# Patient Record
Sex: MALE | Race: WHITE | NOT HISPANIC OR LATINO | Employment: UNEMPLOYED | URBAN - METROPOLITAN AREA
[De-identification: names, ages, dates, MRNs, and addresses within clinical notes are randomized per-mention and may not be internally consistent; named-entity substitution may affect disease eponyms.]

---

## 2017-05-11 ENCOUNTER — HOSPITAL ENCOUNTER (EMERGENCY)
Facility: HOSPITAL | Age: 62
Discharge: HOME | End: 2017-05-11
Attending: EMERGENCY MEDICINE
Payer: MEDICAID

## 2017-05-11 VITALS
DIASTOLIC BLOOD PRESSURE: 80 MMHG | BODY MASS INDEX: 34.93 KG/M2 | SYSTOLIC BLOOD PRESSURE: 145 MMHG | TEMPERATURE: 98 F | OXYGEN SATURATION: 97 % | WEIGHT: 223 LBS | RESPIRATION RATE: 19 BRPM | HEART RATE: 79 BPM

## 2017-05-11 DIAGNOSIS — E86.0 DEHYDRATION: Primary | ICD-10-CM

## 2017-05-11 LAB
ALBUMIN SERPL BCP-MCNC: 4.5 G/DL
ALP SERPL-CCNC: 68 U/L
ALT SERPL W/O P-5'-P-CCNC: 27 U/L
ANION GAP SERPL CALC-SCNC: 13 MMOL/L
AST SERPL-CCNC: 30 U/L
BASOPHILS # BLD AUTO: 0.06 K/UL
BASOPHILS NFR BLD: 0.7 %
BILIRUB SERPL-MCNC: 0.6 MG/DL
BILIRUB UR QL STRIP: NEGATIVE
BUN SERPL-MCNC: 17 MG/DL
CALCIUM SERPL-MCNC: 9.4 MG/DL
CHLORIDE SERPL-SCNC: 102 MMOL/L
CK SERPL-CCNC: 334 U/L
CLARITY UR REFRACT.AUTO: CLEAR
CO2 SERPL-SCNC: 25 MMOL/L
COLOR UR AUTO: YELLOW
CREAT SERPL-MCNC: 1.2 MG/DL
DIFFERENTIAL METHOD: NORMAL
EOSINOPHIL # BLD AUTO: 0.2 K/UL
EOSINOPHIL NFR BLD: 2 %
ERYTHROCYTE [DISTWIDTH] IN BLOOD BY AUTOMATED COUNT: 13 %
EST. GFR  (AFRICAN AMERICAN): >60 ML/MIN/1.73 M^2
EST. GFR  (NON AFRICAN AMERICAN): >60 ML/MIN/1.73 M^2
GLUCOSE SERPL-MCNC: 134 MG/DL
GLUCOSE UR QL STRIP: NEGATIVE
HCT VFR BLD AUTO: 46.7 %
HGB BLD-MCNC: 16 G/DL
HGB UR QL STRIP: ABNORMAL
KETONES UR QL STRIP: NEGATIVE
LEUKOCYTE ESTERASE UR QL STRIP: NEGATIVE
LYMPHOCYTES # BLD AUTO: 2.7 K/UL
LYMPHOCYTES NFR BLD: 31 %
MCH RBC QN AUTO: 30 PG
MCHC RBC AUTO-ENTMCNC: 34.3 %
MCV RBC AUTO: 88 FL
MONOCYTES # BLD AUTO: 0.8 K/UL
MONOCYTES NFR BLD: 9.2 %
NEUTROPHILS # BLD AUTO: 5 K/UL
NEUTROPHILS NFR BLD: 56.8 %
NITRITE UR QL STRIP: NEGATIVE
PH UR STRIP: 6 [PH] (ref 5–8)
PLATELET # BLD AUTO: 299 K/UL
PMV BLD AUTO: 9.7 FL
POTASSIUM SERPL-SCNC: 3.6 MMOL/L
PROT SERPL-MCNC: 7.9 G/DL
PROT UR QL STRIP: NEGATIVE
RBC # BLD AUTO: 5.33 M/UL
SODIUM SERPL-SCNC: 140 MMOL/L
SP GR UR STRIP: <=1.005 (ref 1–1.03)
URN SPEC COLLECT METH UR: ABNORMAL
UROBILINOGEN UR STRIP-ACNC: NEGATIVE EU/DL
WBC # BLD AUTO: 8.81 K/UL

## 2017-05-11 PROCEDURE — 81003 URINALYSIS AUTO W/O SCOPE: CPT

## 2017-05-11 PROCEDURE — 99283 EMERGENCY DEPT VISIT LOW MDM: CPT | Mod: 25

## 2017-05-11 PROCEDURE — 82550 ASSAY OF CK (CPK): CPT

## 2017-05-11 PROCEDURE — 25000003 PHARM REV CODE 250: Performed by: EMERGENCY MEDICINE

## 2017-05-11 PROCEDURE — 85025 COMPLETE CBC W/AUTO DIFF WBC: CPT

## 2017-05-11 PROCEDURE — 80053 COMPREHEN METABOLIC PANEL: CPT

## 2017-05-11 PROCEDURE — 96360 HYDRATION IV INFUSION INIT: CPT

## 2017-05-11 RX ADMIN — SODIUM CHLORIDE 1000 ML: 0.9 INJECTION, SOLUTION INTRAVENOUS at 09:05

## 2017-05-11 NOTE — ED AVS SNAPSHOT
[facility]  [address] [address] [address]               Eber Wilson   2017  8:52 AM   ED    Description:  Male : 1955   Department:  [facility]           Your Care was Coordinated By:     Provider Role From To    Antolin Jacob MD Attending Provider 17 0849 --      Reason for Visit     Dehydration           Diagnoses this Visit        Comments    Dehydration    -  Primary       ED Disposition     ED Disposition Condition Comment    Discharge             To Do List           Follow-up Information     Follow up with JOHN Corley In 2 days.    Specialty:  Family Medicine    Contact information:    [address] [facility]  [place] [address]  [phone]        [facility]     [facility] -  Assistance  Unless otherwise directed by your provider, please contact [facility], our nurse care line that is available for  assistance.     Registered nurses in the [facility] provide: appointment scheduling, clinical advisement, health education, and other advisory services.  Call: [phone] (toll free)               Medications           Message regarding Medications     Verify the changes and/or additions to your medication regime listed below are the same as discussed with your clinician today.  If any of these changes or additions are incorrect, please notify your healthcare provider.        These medications were administered today        Dose Freq    sodium chloride 0.9% bolus 1,000 mL 1,000 mL ED 1 Time    Sig: Inject 1,000 mLs into the vein ED 1 Time.    Class: Normal    Route: Intravenous           Verify that the below list of medications is an accurate representation of the medications you are currently taking.  If none reported, the list may be blank. If incorrect, please contact your healthcare provider. Carry this list with you in case of emergency.           Current  Medications     alprazolam (XANAX) 0.5 MG tablet Take 0.5 mg by mouth 3 (three) times daily.    budesonide-formoterol 160-4.5 mcg (SYMBICORT) 160-4.5 mcg/actuation HFAA Inhale 2 puffs into the lungs every 12 (twelve) hours.    lisinopril 10 MG tablet Take 10 mg by mouth once daily.           Clinical Reference Information           Your Vitals Were     BP Pulse Temp Resp Weight SpO2    158/86 (BP Location: Left arm, Patient Position: Sitting) 93 97.9 °F (36.6 °C) (Oral) 18 101.2 kg (223 lb) 93%    BMI                34.93 kg/m2          Allergies as of 5/11/2017        Reactions    Amoxicillin Itching    Adhesive Rash      Immunizations Administered on Date of Encounter - 5/11/2017     None      ED Micro, Lab, POCT     Start Ordered       Status Ordering Provider    05/11/17 0856 05/11/17 0855  CBC auto differential  STAT      Final result     05/11/17 0856 05/11/17 0855  Comprehensive metabolic panel  STAT      Final result     05/11/17 0856 05/11/17 0855  Urinalysis  STAT      Final result     05/11/17 0856 05/11/17 0855  CK  STAT      Final result       ED Imaging Orders     None        Discharge Instructions         Dehydration    The human body is comprised largely of water. If you lose more fluids than you take in, you can become dehydrated. This means there are not enough fluids in your body for it to function right. Mild dehydration can cause weakness, confusion, or muscle cramps. In extreme cases, it can lead to brain damage and even death. That's why prompt treatment is crucial.  Risk factors  Anyone can become dehydrated. But infants, children, and older adults are at greatest risk. You are most likely to lose fluids with severe vomiting, diarrhea, or a fever. Exercising or working hard--especially in hot weather--can also cause excess fluid loss.  What to do  Drinking liquids is the best way to prevent dehydration. Water is best, but juice or frozen pops can also help. Your doctor may suggest electrolyte  solutions for sick infants and young children.  When to go to the emergency room (ER)  Go to an ER right away for these symptoms:  Adults  · Very dark urine and little urine output  · Dizziness, weakness, confusion, fainting  Children  · Sunken eyes  · Little or no urine output (for infants, no wet diaper in 8 hours)  · Very dark urine  · Skin that doesn't bounce back quickly when pinched  · Crying without tears  What to expect in the ER  Your blood pressure, temperature, and heart rate will be checked. You may have blood or urine tests. The main treatment for dehydration is fluids. You may be given these to drink. Or, you may receive them through a vein in your arm. You also may be treated for diarrhea, vomiting, or a high fever.   Date Last Reviewed: 7/18/2015 © 2000-2016 [employer]. [address] [place] [address]. All rights reserved. This information is not intended as a substitute for professional medical care. Always follow your healthcare professional's instructions.          [facility] Sign-Up     Activating your [facility] account is as easy as 1-2-3!     1) Visit [email or web address], select Sign Up Now, enter this activation code and your date of birth, then select Next.  [email or web address]  Expires: 6/25/2017 10:03 AM      2) Create a username and password to use when you visit [facility] in the future and select a security question in case you lose your password and select Next.    3) Enter your e-mail address and click Sign Up!    Additional Information  If you have questions, please e-mail [email or web address] or call [phone] to talk to our [facility] staff. Remember, [facility] is NOT to be used for urgent needs. For medical emergencies, dial 911.         Smoking Cessation     If you would like to quit smoking:   You may be eligible for free services if you are a [place] resident and started smoking cigarettes before September 1, 1988.  Call the Smoking Cessation Trust (SCT)  toll free at (243) 058-7818 or ([phone]) 078-2088.   Call 1-800-QUIT-NOW if you do not meet the above criteria.   Contact us via email: [email or web address]   View our website for more information: [email or web address]         [facility] complies with applicable Federal civil rights laws and does not discriminate on the basis of race, color, national origin, age, disability, or sex.        Language Assistance Services     ATTENTION: Language assistance services are available, free of charge. Please call [phone].      ATENCIÓN: Si habla español, tiene a bowser disposición servicios gratuitos de asistencia lingüística. Llame al [phone].     CHÚ Ý: N?u b?n nói Ti?ng Vi?t, có các d?ch v? h? tr? ngôn ng? mi?n phí dành cho b?n. G?i s? [phone].

## 2017-05-11 NOTE — ED PROVIDER NOTES
"Encounter Date: 5/11/2017       History     Chief Complaint   Patient presents with    Dehydration     "been crawfishing, barely peeing like coffee"     Review of patient's allergies indicates:   Allergen Reactions    Amoxicillin Itching    Adhesive Rash     Patient is a 62 y.o. male presenting with the following complaint: general illness. The history is provided by the patient.   General Illness    The current episode started yesterday. The problem occurs rarely. The problem has been gradually improving. Relieved by: Drinking water. The symptoms are aggravated by activity. Associated symptoms include nausea and muscle aches. Pertinent negatives include no fever, no diarrhea, no vomiting, no sore throat, no cough, no shortness of breath, no wheezing and no rash. He has received no recent medical care.     Past Medical History:   Diagnosis Date    Anxiety     Diverticular disease     Hypertension      Past Surgical History:   Procedure Laterality Date    COLOSTOMY CLOSURE      HERNIA REPAIR      LAPAROSCOPIC COLOSTOMY       History reviewed. No pertinent family history.  Social History   Substance Use Topics    Smoking status: Current Every Day Smoker     Packs/day: 1.50     Types: Cigarettes    Smokeless tobacco: Never Used    Alcohol use No     Review of Systems   Constitutional: Negative for fever.   HENT: Negative for sore throat.    Respiratory: Negative for cough, shortness of breath and wheezing.    Cardiovascular: Negative for chest pain.   Gastrointestinal: Positive for nausea. Negative for diarrhea and vomiting.   Genitourinary: Negative for dysuria.   Musculoskeletal: Negative for back pain.   Skin: Negative for rash.   Neurological: Negative for weakness.   Hematological: Does not bruise/bleed easily.       Physical Exam   Initial Vitals   BP Pulse Resp Temp SpO2   05/11/17 0849 05/11/17 0849 05/11/17 0849 05/11/17 0849 05/11/17 0849   158/86 93 18 97.9 °F (36.6 °C) 93 %     Physical " Exam    Constitutional: He appears well-developed and well-nourished. No distress.   HENT:   Head: Normocephalic and atraumatic.   Eyes: Conjunctivae are normal. Pupils are equal, round, and reactive to light.   Neck: Normal range of motion. Neck supple.   Cardiovascular: Normal rate, regular rhythm and normal heart sounds.   Pulmonary/Chest: Breath sounds normal.   Abdominal: Soft. Bowel sounds are normal.   Musculoskeletal: Normal range of motion.   Neurological: He is alert and oriented to person, place, and time. No cranial nerve deficit.   Skin: Skin is warm and dry.   Psychiatric: He has a normal mood and affect.         ED Course   Procedures  Labs Reviewed   COMPREHENSIVE METABOLIC PANEL - Abnormal; Notable for the following:        Result Value    Glucose 134 (*)     All other components within normal limits   URINALYSIS - Abnormal; Notable for the following:     Occult Blood UA Trace (*)     All other components within normal limits   CK - Abnormal; Notable for the following:      (*)     All other components within normal limits   CBC W/ AUTO DIFFERENTIAL        ED Vital Signs:  Vitals:    05/11/17 0849   BP: (!) 158/86   Pulse: 93   Resp: 18   Temp: 97.9 °F (36.6 °C)   TempSrc: Oral   SpO2: (!) 93%   Weight: 101.2 kg (223 lb)         Abnormal Lab Results:  Labs Reviewed   COMPREHENSIVE METABOLIC PANEL - Abnormal; Notable for the following:        Result Value    Glucose 134 (*)     All other components within normal limits   URINALYSIS - Abnormal; Notable for the following:     Occult Blood UA Trace (*)     All other components within normal limits   CK - Abnormal; Notable for the following:      (*)     All other components within normal limits   CBC W/ AUTO DIFFERENTIAL          All Lab Results:  Results for orders placed or performed during the hospital encounter of 05/11/17   CBC auto differential   Result Value Ref Range    WBC 8.81 3.90 - 12.70 K/uL    RBC 5.33 4.60 - 6.20 M/uL     Hemoglobin 16.0 14.0 - 18.0 g/dL    Hematocrit 46.7 40.0 - 54.0 %    MCV 88 82 - 98 fL    MCH 30.0 27.0 - 31.0 pg    MCHC 34.3 32.0 - 36.0 %    RDW 13.0 11.5 - 14.5 %    Platelets 299 150 - 350 K/uL    MPV 9.7 9.2 - 12.9 fL    Gran # 5.0 1.8 - 7.7 K/uL    Lymph # 2.7 1.0 - 4.8 K/uL    Mono # 0.8 0.3 - 1.0 K/uL    Eos # 0.2 0.0 - 0.5 K/uL    Baso # 0.06 0.00 - 0.20 K/uL    Gran% 56.8 38.0 - 73.0 %    Lymph% 31.0 18.0 - 48.0 %    Mono% 9.2 4.0 - 15.0 %    Eosinophil% 2.0 0.0 - 8.0 %    Basophil% 0.7 0.0 - 1.9 %    Differential Method Automated    Comprehensive metabolic panel   Result Value Ref Range    Sodium 140 136 - 145 mmol/L    Potassium 3.6 3.5 - 5.1 mmol/L    Chloride 102 95 - 110 mmol/L    CO2 25 23 - 29 mmol/L    Glucose 134 (H) 70 - 110 mg/dL    BUN, Bld 17 8 - 23 mg/dL    Creatinine 1.2 0.5 - 1.4 mg/dL    Calcium 9.4 8.7 - 10.5 mg/dL    Total Protein 7.9 6.0 - 8.4 g/dL    Albumin 4.5 3.5 - 5.2 g/dL    Total Bilirubin 0.6 0.1 - 1.0 mg/dL    Alkaline Phosphatase 68 55 - 135 U/L    AST 30 10 - 40 U/L    ALT 27 10 - 44 U/L    Anion Gap 13 8 - 16 mmol/L    eGFR if African American >60.0 >60 mL/min/1.73 m^2    eGFR if non African American >60.0 >60 mL/min/1.73 m^2   Urinalysis   Result Value Ref Range    Specimen UA Urine, Clean Catch     Color, UA Yellow Yellow, Straw, Abbey    Appearance, UA Clear Clear    pH, UA 6.0 5.0 - 8.0    Specific Gravity, UA <=1.005 1.005 - 1.030    Protein, UA Negative Negative    Glucose, UA Negative Negative    Ketones, UA Negative Negative    Bilirubin (UA) Negative Negative    Occult Blood UA Trace (A) Negative    Nitrite, UA Negative Negative    Urobilinogen, UA Negative <2.0 EU/dL    Leukocytes, UA Negative Negative   CK   Result Value Ref Range     (H) 20 - 200 U/L           Imaging Results:  Imaging Results     None             The Emergency Provider reviewed the vital signs and test results, which are outlined above.    ED Discussions:  10:04 AM: Reassessed pt at this  time.  Pt states his condition has improved at this time.  Patient states he feels much better after IV fluids. Discussed with pt all pertinent ED information and results. Discussed pt dx of dehydration and plan of tx. Gave pt all f/u and return to the ED instructions. All questions and concerns were addressed at this time. Pt expresses understanding of information and instructions, and is comfortable with plan to discharge. Pt is stable for discharge.                                 ED Course     Clinical Impression:       ICD-10-CM ICD-9-CM   1. Dehydration E86.0 276.51         Disposition:   Disposition: Discharged  Condition: Stable       Antolin Jacob MD  05/11/17 1004

## 2017-05-11 NOTE — DISCHARGE INSTRUCTIONS
Dehydration    The human body is comprised largely of water. If you lose more fluids than you take in, you can become dehydrated. This means there are not enough fluids in your body for it to function right. Mild dehydration can cause weakness, confusion, or muscle cramps. In extreme cases, it can lead to brain damage and even death. That's why prompt treatment is crucial.  Risk factors  Anyone can become dehydrated. But infants, children, and older adults are at greatest risk. You are most likely to lose fluids with severe vomiting, diarrhea, or a fever. Exercising or working hard--especially in hot weather--can also cause excess fluid loss.  What to do  Drinking liquids is the best way to prevent dehydration. Water is best, but juice or frozen pops can also help. Your doctor may suggest electrolyte solutions for sick infants and young children.  When to go to the emergency room (ER)  Go to an ER right away for these symptoms:  Adults  · Very dark urine and little urine output  · Dizziness, weakness, confusion, fainting  Children  · Sunken eyes  · Little or no urine output (for infants, no wet diaper in 8 hours)  · Very dark urine  · Skin that doesn't bounce back quickly when pinched  · Crying without tears  What to expect in the ER  Your blood pressure, temperature, and heart rate will be checked. You may have blood or urine tests. The main treatment for dehydration is fluids. You may be given these to drink. Or, you may receive them through a vein in your arm. You also may be treated for diarrhea, vomiting, or a high fever.   Date Last Reviewed: 7/18/2015  © 2354-0205 [employer]. [address] [place] [address], [place] [address]. All rights reserved. This information is not intended as a substitute for professional medical care. Always follow your healthcare professional's instructions.

## 2017-05-26 ENCOUNTER — HOSPITAL ENCOUNTER (EMERGENCY)
Facility: HOSPITAL | Age: 62
Discharge: HOME | End: 2017-05-26
Attending: EMERGENCY MEDICINE
Payer: MEDICAID

## 2017-05-26 VITALS
WEIGHT: 224 LBS | TEMPERATURE: 99 F | SYSTOLIC BLOOD PRESSURE: 139 MMHG | OXYGEN SATURATION: 98 % | BODY MASS INDEX: 33.95 KG/M2 | HEART RATE: 90 BPM | DIASTOLIC BLOOD PRESSURE: 88 MMHG | HEIGHT: 68 IN | RESPIRATION RATE: 20 BRPM

## 2017-05-26 DIAGNOSIS — T14.8XXA ABRASION: ICD-10-CM

## 2017-05-26 DIAGNOSIS — S09.90XA HEAD INJURY, INITIAL ENCOUNTER: Primary | ICD-10-CM

## 2017-05-26 DIAGNOSIS — R41.82 ALTERED MENTAL STATUS: ICD-10-CM

## 2017-05-26 DIAGNOSIS — T14.90XA TRAUMA, BLUNT: ICD-10-CM

## 2017-05-26 PROCEDURE — 90715 TDAP VACCINE 7 YRS/> IM: CPT | Performed by: EMERGENCY MEDICINE

## 2017-05-26 PROCEDURE — 90471 IMMUNIZATION ADMIN: CPT | Performed by: EMERGENCY MEDICINE

## 2017-05-26 PROCEDURE — 63600175 PHARM REV CODE 636 W HCPCS: Performed by: EMERGENCY MEDICINE

## 2017-05-26 PROCEDURE — 99284 EMERGENCY DEPT VISIT MOD MDM: CPT

## 2017-05-26 RX ADMIN — CLOSTRIDIUM TETANI TOXOID ANTIGEN (FORMALDEHYDE INACTIVATED), CORYNEBACTERIUM DIPHTHERIAE TOXOID ANTIGEN (FORMALDEHYDE INACTIVATED), BORDETELLA PERTUSSIS TOXOID ANTIGEN (GLUTARALDEHYDE INACTIVATED), BORDETELLA PERTUSSIS FILAMENTOUS HEMAGGLUTININ ANTIGEN (FORMALDEHYDE INACTIVATED), BORDETELLA PERTUSSIS PERTACTIN ANTIGEN, AND BORDETELLA PERTUSSIS FIMBRIAE 2/3 ANTIGEN 0.5 ML: 5; 2; 2.5; 5; 3; 5 INJECTION, SUSPENSION INTRAMUSCULAR at 12:05
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
## 2017-05-31 ENCOUNTER — HOSPITAL ENCOUNTER (OUTPATIENT)
Dept: RADIOLOGY | Facility: HOSPITAL | Age: 62
Discharge: HOME | End: 2017-05-31
Attending: FAMILY MEDICINE
Payer: MEDICAID

## 2017-05-31 ENCOUNTER — HOSPITAL ENCOUNTER (EMERGENCY)
Facility: HOSPITAL | Age: 62
Discharge: HOME | End: 2017-05-31
Payer: MEDICAID

## 2017-05-31 VITALS
HEIGHT: 68 IN | RESPIRATION RATE: 18 BRPM | SYSTOLIC BLOOD PRESSURE: 173 MMHG | HEART RATE: 106 BPM | DIASTOLIC BLOOD PRESSURE: 83 MMHG | WEIGHT: 215 LBS | BODY MASS INDEX: 32.58 KG/M2 | TEMPERATURE: 98 F | OXYGEN SATURATION: 97 %

## 2017-05-31 DIAGNOSIS — R05.9 COUGH: ICD-10-CM

## 2017-05-31 DIAGNOSIS — F17.210 CIGARETTE SMOKER: Primary | ICD-10-CM

## 2017-05-31 DIAGNOSIS — J44.1 OBSTRUCTIVE CHRONIC BRONCHITIS WITH EXACERBATION: ICD-10-CM

## 2017-05-31 DIAGNOSIS — F17.210 CIGARETTE SMOKER: ICD-10-CM

## 2017-05-31 DIAGNOSIS — J44.1 COPD EXACERBATION: Primary | ICD-10-CM

## 2017-05-31 DIAGNOSIS — R06.2 WHEEZING: ICD-10-CM

## 2017-05-31 DIAGNOSIS — Z72.0 DECLINED SMOKING CESSATION: ICD-10-CM

## 2017-05-31 PROCEDURE — 71020 XR CHEST PA AND LATERAL: CPT | Mod: 26,,, | Performed by: RADIOLOGY

## 2017-05-31 PROCEDURE — 94640 AIRWAY INHALATION TREATMENT: CPT

## 2017-05-31 PROCEDURE — 99900035 HC TECH TIME PER 15 MIN (STAT)

## 2017-05-31 PROCEDURE — 99283 EMERGENCY DEPT VISIT LOW MDM: CPT | Mod: 25

## 2017-05-31 PROCEDURE — 25000242 PHARM REV CODE 250 ALT 637 W/ HCPCS: Performed by: PHYSICIAN ASSISTANT

## 2017-05-31 RX ORDER — FLUTICASONE FUROATE AND VILANTEROL 100; 25 UG/1; UG/1
1 POWDER RESPIRATORY (INHALATION) DAILY
COMMUNITY
End: 2017-09-30

## 2017-05-31 RX ORDER — ALBUTEROL SULFATE 90 UG/1
2 AEROSOL, METERED RESPIRATORY (INHALATION) EVERY 6 HOURS PRN
COMMUNITY

## 2017-05-31 RX ORDER — IPRATROPIUM BROMIDE AND ALBUTEROL SULFATE 2.5; .5 MG/3ML; MG/3ML
3 SOLUTION RESPIRATORY (INHALATION)
Status: COMPLETED | OUTPATIENT
Start: 2017-05-31 | End: 2017-05-31

## 2017-05-31 RX ORDER — PREDNISONE 20 MG/1
20 TABLET ORAL DAILY
Status: ON HOLD | COMMUNITY
End: 2017-06-04 | Stop reason: HOSPADM

## 2017-05-31 RX ORDER — AZITHROMYCIN 250 MG/1
500 TABLET, FILM COATED ORAL DAILY
Status: ON HOLD | COMMUNITY
End: 2017-06-04 | Stop reason: HOSPADM

## 2017-05-31 RX ADMIN — IPRATROPIUM BROMIDE AND ALBUTEROL SULFATE 3 ML: .5; 3 SOLUTION RESPIRATORY (INHALATION) at 04:05

## 2017-05-31 NOTE — ED PROVIDER NOTES
History      Chief Complaint   Patient presents with    Cough     cough and congestion since fri seen per dr. jones given steriod shot.       Review of patient's allergies indicates:   Allergen Reactions    Amoxicillin Itching    Penicillins     Adhesive Rash        HPI   HPI    5/31/2017, 3:52 PM   History obtained from the patient      History of Present Illness: Eber Wilson is a 62 y.o. male patient who presents to the Emergency Department for cough and congestion x 5 days.  Patient has history of COPD.  Patient was prescribed Zpak, Breo Ellipta inhaler, Ventolin HFA, prednisone and steroid injection by PCP yesterday.  He states that he started Zpak, Ventolin HFA, and prednisone today.  Patient also had chest xray earlier today.       Arrival mode: Personal vehicle    PCP: JOHN Corley       Past Medical History:  Past Medical History:   Diagnosis Date    Anxiety     COPD (chronic obstructive pulmonary disease)     Diverticular disease     Hypertension        Past Surgical History:  Past Surgical History:   Procedure Laterality Date    COLOSTOMY CLOSURE      HERNIA REPAIR      LAPAROSCOPIC COLOSTOMY           Family History:  Family History   Problem Relation Age of Onset    COPD Mother     COPD Father        Social History:  Social History     Social History Main Topics    Smoking status: Current Every Day Smoker     Packs/day: 2.00     Types: Cigarettes    Smokeless tobacco: Never Used    Alcohol use No    Drug use: No    Sexual activity: Yes     Partners: Female       ROS   Review of Systems   Constitutional: Negative for chills and fever.   HENT: Positive for congestion, postnasal drip and rhinorrhea. Negative for ear pain.    Respiratory: Positive for cough, shortness of breath and wheezing.    Gastrointestinal: Negative for diarrhea and vomiting.       Physical Exam      Initial Vitals [05/31/17 1512]   BP Pulse Resp Temp SpO2   (!) 167/93 96 20 98.3 °F (36.8 °C) (!) 91 %  "     Physical Exam  Nursing Notes and Vital Signs Reviewed.  Constitutional: Patient is in no apparent distress. Awake and alert. Well-developed and well-nourished.  Head: Atraumatic. Normocephalic.  Eyes: PERRL. EOM intact. Conjunctivae are not pale. No scleral icterus.  ENT: Mucous membranes are moist. Oropharynx is clear and symmetric.    Neck: Supple. Full ROM. No lymphadenopathy.  Cardiovascular: Regular rate. Regular rhythm. No murmurs, rubs, or gallops.   Pulmonary/Chest: No respiratory distress. Clear to auscultation bilaterally. Expiratory wheezing.  Cough present  Abdominal: Soft and non-distended.  There is no tenderness.  No rebound, guarding, or rigidity. Good bowel sounds.  Musculoskeletal: Moves all extremities. No obvious deformities. No edema.   Skin: Warm and dry.  Neurological:  Alert, awake, and appropriate.  Normal speech.  No acute focal neurological deficits are appreciated.  Psychiatric: Normal affect. Good eye contact. Appropriate in content.    ED Course    Procedures  ED Vital Signs:  Vitals:    05/31/17 1512 05/31/17 1603 05/31/17 1613 05/31/17 1617   BP: (!) 167/93      Pulse: 96 95 96 99   Resp: 20 18 18 18   Temp: 98.3 °F (36.8 °C)      TempSrc: Oral      SpO2: (!) 91% 96% 100% 100%   Weight: 97.5 kg (215 lb)      Height: 5' 8" (1.727 m)       05/31/17 1639   BP: (!) 173/83   Pulse: 106   Resp: 18   Temp:    TempSrc:    SpO2: 97%   Weight:    Height:        Abnormal Lab Results:  Labs Reviewed - No data to display         Imaging Results:  Imaging Results    None                 The Emergency Provider reviewed the vital signs and test results, which are outlined above.    ED Discussion     Discussed with pt all pertinent ED information and results. Discussed pt dx and plan of tx. Gave pt all f/u and return to the ED instructions. All questions and concerns were addressed at this time. Pt expresses understanding of information and instructions, and is comfortable with plan to discharge. " Pt is stable for discharge.    I discussed with patient and/or family/caretaker that evaluation in the ED does not suggest any emergent or life threatening medical conditions requiring immediate intervention beyond what was provided in the ED, and I believe patient is safe for discharge.  Regardless, an unremarkable evaluation in the ED does not preclude the development or presence of a serious of life threatening condition. As such, patient was instructed to return immediately for any worsening or change in current symptoms.      ED Medication(s):  Medications   albuterol-ipratropium 2.5mg-0.5mg/3mL nebulizer solution 3 mL (3 mLs Nebulization Given 5/31/17 1617)       Discharge Medication List as of 5/31/2017  4:32 PM          Follow-up Information     JOHN Corley in 2 days.    Specialty:  Family Medicine  Contact information:  [address] [place] [address] [phone]                     Medical Decision Making                  Clinical Impression       ICD-10-CM ICD-9-CM   1. COPD exacerbation J44.1 491.21   2. Cough R05 786.2   3. Declined smoking cessation Z72.0 [id number]       Disposition:   Disposition: Discharged  Condition: Stable           Liz Brumfield PA-C  05/31/17 2138

## 2017-06-02 ENCOUNTER — HOSPITAL ENCOUNTER (INPATIENT)
Facility: HOSPITAL | Age: 62
LOS: 2 days | Discharge: HOME | DRG: 192 | End: 2017-06-04
Attending: EMERGENCY MEDICINE | Admitting: INTERNAL MEDICINE
Payer: MEDICAID

## 2017-06-02 DIAGNOSIS — J44.1 COPD EXACERBATION: Primary | ICD-10-CM

## 2017-06-02 DIAGNOSIS — R06.00 DYSPNEA: ICD-10-CM

## 2017-06-02 DIAGNOSIS — R09.02 HYPOXIA: ICD-10-CM

## 2017-06-02 LAB
ALBUMIN SERPL BCP-MCNC: 4.3 G/DL
ALLENS TEST: ABNORMAL
ALP SERPL-CCNC: 79 U/L
ALT SERPL W/O P-5'-P-CCNC: 82 U/L
ANION GAP SERPL CALC-SCNC: 13 MMOL/L
AST SERPL-CCNC: 79 U/L
BASOPHILS # BLD AUTO: 0.06 K/UL
BASOPHILS NFR BLD: 0.4 %
BILIRUB SERPL-MCNC: 0.4 MG/DL
BUN SERPL-MCNC: 18 MG/DL
CALCIUM SERPL-MCNC: 10.1 MG/DL
CHLORIDE SERPL-SCNC: 99 MMOL/L
CO2 SERPL-SCNC: 28 MMOL/L
CREAT SERPL-MCNC: 1.1 MG/DL
DELSYS: ABNORMAL
DIFFERENTIAL METHOD: ABNORMAL
EOSINOPHIL # BLD AUTO: 0 K/UL
EOSINOPHIL NFR BLD: 0.1 %
ERYTHROCYTE [DISTWIDTH] IN BLOOD BY AUTOMATED COUNT: 13.7 %
EST. GFR  (AFRICAN AMERICAN): >60 ML/MIN/1.73 M^2
EST. GFR  (NON AFRICAN AMERICAN): >60 ML/MIN/1.73 M^2
FIO2: 21
GLUCOSE SERPL-MCNC: 119 MG/DL
HCO3 UR-SCNC: 29.6 MMOL/L (ref 24–28)
HCT VFR BLD AUTO: 48 %
HGB BLD-MCNC: 15.6 G/DL
LYMPHOCYTES # BLD AUTO: 2 K/UL
LYMPHOCYTES NFR BLD: 15.3 %
MCH RBC QN AUTO: 29.3 PG
MCHC RBC AUTO-ENTMCNC: 32.5 %
MCV RBC AUTO: 90 FL
MODE: ABNORMAL
MONOCYTES # BLD AUTO: 1.3 K/UL
MONOCYTES NFR BLD: 9.3 %
NEUTROPHILS # BLD AUTO: 9.9 K/UL
NEUTROPHILS NFR BLD: 74.9 %
PCO2 BLDA: 45.5 MMHG (ref 35–45)
PH SMN: 7.42 [PH] (ref 7.35–7.45)
PLATELET # BLD AUTO: 310 K/UL
PMV BLD AUTO: 9.2 FL
PO2 BLDA: 62 MMHG (ref 80–100)
POC BE: 5 MMOL/L
POC SATURATED O2: 92 % (ref 95–100)
POTASSIUM SERPL-SCNC: 3.7 MMOL/L
PROT SERPL-MCNC: 8.3 G/DL
RBC # BLD AUTO: 5.33 M/UL
SAMPLE: ABNORMAL
SITE: ABNORMAL
SODIUM SERPL-SCNC: 140 MMOL/L
TROPONIN I SERPL DL<=0.01 NG/ML-MCNC: 0.01 NG/ML
TROPONIN I SERPL DL<=0.01 NG/ML-MCNC: <0.006 NG/ML
WBC # BLD AUTO: 13.37 K/UL

## 2017-06-02 PROCEDURE — 93005 ELECTROCARDIOGRAM TRACING: CPT

## 2017-06-02 PROCEDURE — 82803 BLOOD GASES ANY COMBINATION: CPT

## 2017-06-02 PROCEDURE — 36600 WITHDRAWAL OF ARTERIAL BLOOD: CPT

## 2017-06-02 PROCEDURE — 93010 ELECTROCARDIOGRAM REPORT: CPT | Mod: ,,, | Performed by: INTERNAL MEDICINE

## 2017-06-02 PROCEDURE — 25000003 PHARM REV CODE 250: Performed by: FAMILY MEDICINE

## 2017-06-02 PROCEDURE — 99900035 HC TECH TIME PER 15 MIN (STAT)

## 2017-06-02 PROCEDURE — 84484 ASSAY OF TROPONIN QUANT: CPT | Mod: 91

## 2017-06-02 PROCEDURE — 21400001 HC TELEMETRY ROOM

## 2017-06-02 PROCEDURE — 99285 EMERGENCY DEPT VISIT HI MDM: CPT

## 2017-06-02 PROCEDURE — 25000242 PHARM REV CODE 250 ALT 637 W/ HCPCS: Performed by: EMERGENCY MEDICINE

## 2017-06-02 PROCEDURE — 84484 ASSAY OF TROPONIN QUANT: CPT

## 2017-06-02 PROCEDURE — 94640 AIRWAY INHALATION TREATMENT: CPT

## 2017-06-02 PROCEDURE — 36415 COLL VENOUS BLD VENIPUNCTURE: CPT

## 2017-06-02 PROCEDURE — 80053 COMPREHEN METABOLIC PANEL: CPT

## 2017-06-02 PROCEDURE — 85025 COMPLETE CBC W/AUTO DIFF WBC: CPT

## 2017-06-02 PROCEDURE — 63600175 PHARM REV CODE 636 W HCPCS: Performed by: EMERGENCY MEDICINE

## 2017-06-02 RX ORDER — PREDNISONE 20 MG/1
60 TABLET ORAL
Status: COMPLETED | OUTPATIENT
Start: 2017-06-02 | End: 2017-06-02

## 2017-06-02 RX ORDER — LISINOPRIL 10 MG/1
10 TABLET ORAL DAILY
Status: DISCONTINUED | OUTPATIENT
Start: 2017-06-03 | End: 2017-06-04 | Stop reason: HOSPADM

## 2017-06-02 RX ORDER — IPRATROPIUM BROMIDE AND ALBUTEROL SULFATE 2.5; .5 MG/3ML; MG/3ML
3 SOLUTION RESPIRATORY (INHALATION) EVERY 4 HOURS
Status: DISCONTINUED | OUTPATIENT
Start: 2017-06-02 | End: 2017-06-04 | Stop reason: HOSPADM

## 2017-06-02 RX ORDER — ALPRAZOLAM 0.5 MG/1
0.5 TABLET ORAL ONCE AS NEEDED
Status: COMPLETED | OUTPATIENT
Start: 2017-06-02 | End: 2017-06-02

## 2017-06-02 RX ORDER — IPRATROPIUM BROMIDE AND ALBUTEROL SULFATE 2.5; .5 MG/3ML; MG/3ML
3 SOLUTION RESPIRATORY (INHALATION)
Status: COMPLETED | OUTPATIENT
Start: 2017-06-02 | End: 2017-06-02

## 2017-06-02 RX ORDER — GUAIFENESIN 600 MG/1
600 TABLET, EXTENDED RELEASE ORAL 2 TIMES DAILY
Status: DISCONTINUED | OUTPATIENT
Start: 2017-06-02 | End: 2017-06-03

## 2017-06-02 RX ORDER — NAPROXEN SODIUM 220 MG/1
81 TABLET, FILM COATED ORAL DAILY
Status: DISCONTINUED | OUTPATIENT
Start: 2017-06-03 | End: 2017-06-04 | Stop reason: HOSPADM

## 2017-06-02 RX ADMIN — PANTOPRAZOLE SODIUM 600 MG: 40 TABLET, DELAYED RELEASE ORAL at 09:06

## 2017-06-02 RX ADMIN — METHYLPREDNISOLONE SODIUM SUCCINATE 80 MG: 125 INJECTION, POWDER, FOR SOLUTION INTRAMUSCULAR; INTRAVENOUS at 11:06

## 2017-06-02 RX ADMIN — IPRATROPIUM BROMIDE AND ALBUTEROL SULFATE 3 ML: .5; 3 SOLUTION RESPIRATORY (INHALATION) at 07:06

## 2017-06-02 RX ADMIN — IPRATROPIUM BROMIDE AND ALBUTEROL SULFATE 3 ML: .5; 3 SOLUTION RESPIRATORY (INHALATION) at 01:06

## 2017-06-02 RX ADMIN — PREDNISONE 60 MG: 20 TABLET ORAL at 01:06

## 2017-06-02 RX ADMIN — ALPRAZOLAM 0.5 MG: 0.5 TABLET ORAL at 09:06

## 2017-06-02 NOTE — ED PROVIDER NOTES
History      Chief Complaint   Patient presents with    Shortness of Breath     hx of bronchitis for about a week; seen recently in the ED, no relief    Cough       Review of patient's allergies indicates:   Allergen Reactions    Amoxicillin Itching    Penicillins     Adhesive Rash        HPI   HPI    6/2/2017, 1:57 PM   History obtained from the patient      History of Present Illness: Eber Wilson is a 62 y.o. male patient who presents to the Emergency Department for complaints of shortness of breath.  The patient notes that he's been having some shortness of breath over the last week.  The patient states that he was seen yesterday here and evaluated and diagnosed with bronchitis.  The patient was seen by his PCP a few days ago and was prescribed a Z-Carter, Ventolin inhaler, and prednisone.  The patient notes that he is finished his antibiotics and has given himself an inhaler treatments at home.  The patient notes that the shortness of breath has not improved very much and has back here in the ED for further evaluation treatment.  It is noted that on triage the patient has SPO2 value of 88% on room air.  A repeat SPO2 value done while the patient is supine in bed is 93%.  The patient denies any fever chills nausea or vomiting.  The patient denies any chest pain or near syncope.  The patient also endorses that he still smokes and is actively smoking with his concerns of dyspnea.  There are no other major concerns or complaints noted at this time.      Arrival mode: Personal vehicle    PCP: JOHN Corley       Past Medical History:  Past Medical History:   Diagnosis Date    Anxiety     COPD (chronic obstructive pulmonary disease)     Diverticular disease     Hypertension        Past Surgical History:  Past Surgical History:   Procedure Laterality Date    COLOSTOMY CLOSURE      HERNIA REPAIR      LAPAROSCOPIC COLOSTOMY           Family History:  Family History   Problem Relation Age of  Onset    COPD Mother     COPD Father        Social History:  Social History     Social History Main Topics    Smoking status: Current Every Day Smoker     Packs/day: 2.00     Types: Cigarettes    Smokeless tobacco: Never Used    Alcohol use No    Drug use: No    Sexual activity: Yes     Partners: Female       ROS   Review of Systems   Constitutional: Negative for fever.   HENT: Negative for sore throat.    Respiratory: Positive for cough, shortness of breath and wheezing.    Cardiovascular: Negative for chest pain.   Gastrointestinal: Negative for nausea and vomiting.   Genitourinary: Negative for dysuria.   Musculoskeletal: Negative for back pain.   Skin: Negative for rash.   Neurological: Negative for weakness.   Hematological: Does not bruise/bleed easily.   All other systems reviewed and are negative.      Physical Exam      Initial Vitals   BP Pulse Resp Temp SpO2   06/02/17 1311 06/02/17 1311 06/02/17 1311 06/02/17 1311 06/02/17 1313   132/81 90 (!) 22 98.6 °F (37 °C) (!) 88 %      Physical Exam  Nursing Notes and Vital Signs Reviewed.  Constitutional: Patient is in mild distress. Well-developed and well-nourished.  Head: Atraumatic. Normocephalic.  Eyes: PERRL. EOM intact. Conjunctivae are not pale. No scleral icterus.  ENT: Mucous membranes are moist. Oropharynx is clear and symmetric.    Neck: Supple. Full ROM. No lymphadenopathy.  Cardiovascular: Regular rate. Regular rhythm. No murmurs, rubs, or gallops. Distal pulses are 2+ and symmetric.  Pulmonary/Chest: No respiratory distress.  Noted wheezing on auscultation bilaterally. No rales or rhonchi.  Abdominal: Soft and non-distended.  There is no tenderness.  No rebound, guarding, or rigidity. Good bowel sounds.  Genitourinary: No CVA tenderness  Musculoskeletal: Moves all extremities. No obvious deformities. No edema. No calf tenderness.  Skin: Warm and dry.  Neurological:  Alert, awake, and appropriate.  Normal speech.  No acute focal neurological  deficits are appreciated.  Psychiatric: Normal affect. Good eye contact. Appropriate in content.    ED Course    Procedures  ED Vital Signs:  Vitals:    06/03/17 1720 06/03/17 1930 06/03/17 1940 06/04/17 0015   BP:   139/75 118/71   Pulse: 70 84 77 74   Resp:  18 20 20   Temp:   97.7 °F (36.5 °C) 97.5 °F (36.4 °C)   TempSrc:   Oral Oral   SpO2:  97% (!) 94% 96%   Weight:       Height:        06/04/17 0016 06/04/17 0300 06/04/17 0414 06/04/17 0721   BP:  114/68     Pulse: 71 81 73 77   Resp: 18 20 20 20   Temp:  97.5 °F (36.4 °C)     TempSrc:  Oral     SpO2: 95% 95% 96% 96%   Weight:       Height:        06/04/17 0725 06/04/17 0743 06/04/17 0930 06/04/17 1119   BP:  112/63  133/79   Pulse: 79 88 92 87   Resp: 20 20  18   Temp:  97.8 °F (36.6 °C)  97.7 °F (36.5 °C)   TempSrc:  Oral  Oral   SpO2: 96% 95%  (!) 93%   Weight:       Height:        06/04/17 1120 06/04/17 1130 06/04/17 1330   BP:      Pulse: 94 97 87   Resp:  20    Temp:      TempSrc:      SpO2:  95%    Weight:      Height:          Abnormal Lab Results:  Labs Reviewed   CBC W/ AUTO DIFFERENTIAL - Abnormal; Notable for the following:        Result Value    WBC 13.37 (*)     Gran # 9.9 (*)     Mono # 1.3 (*)     Gran% 74.9 (*)     Lymph% 15.3 (*)     All other components within normal limits   COMPREHENSIVE METABOLIC PANEL - Abnormal; Notable for the following:     Glucose 119 (*)     AST 79 (*)     ALT 82 (*)     All other components within normal limits   ISTAT PROCEDURE - Abnormal; Notable for the following:     POC PCO2 45.5 (*)     POC PO2 62 (*)     POC HCO3 29.6 (*)     POC SATURATED O2 92 (*)     All other components within normal limits   TROPONIN I        All Lab Results:  Results for orders placed or performed during the hospital encounter of 06/02/17   Troponin I   Result Value Ref Range    Troponin I <0.006 0.000 - 0.026 ng/mL   CBC auto differential   Result Value Ref Range    WBC 13.37 (H) 3.90 - 12.70 K/uL    RBC 5.33 4.60 - 6.20 M/uL     Hemoglobin 15.6 14.0 - 18.0 g/dL    Hematocrit 48.0 40.0 - 54.0 %    MCV 90 82 - 98 fL    MCH 29.3 27.0 - 31.0 pg    MCHC 32.5 32.0 - 36.0 %    RDW 13.7 11.5 - 14.5 %    Platelets 310 150 - 350 K/uL    MPV 9.2 9.2 - 12.9 fL    Gran # 9.9 (H) 1.8 - 7.7 K/uL    Lymph # 2.0 1.0 - 4.8 K/uL    Mono # 1.3 (H) 0.3 - 1.0 K/uL    Eos # 0.0 0.0 - 0.5 K/uL    Baso # 0.06 0.00 - 0.20 K/uL    Gran% 74.9 (H) 38.0 - 73.0 %    Lymph% 15.3 (L) 18.0 - 48.0 %    Mono% 9.3 4.0 - 15.0 %    Eosinophil% 0.1 0.0 - 8.0 %    Basophil% 0.4 0.0 - 1.9 %    Differential Method Automated    Comprehensive metabolic panel   Result Value Ref Range    Sodium 140 136 - 145 mmol/L    Potassium 3.7 3.5 - 5.1 mmol/L    Chloride 99 95 - 110 mmol/L    CO2 28 23 - 29 mmol/L    Glucose 119 (H) 70 - 110 mg/dL    BUN, Bld 18 8 - 23 mg/dL    Creatinine 1.1 0.5 - 1.4 mg/dL    Calcium 10.1 8.7 - 10.5 mg/dL    Total Protein 8.3 6.0 - 8.4 g/dL    Albumin 4.3 3.5 - 5.2 g/dL    Total Bilirubin 0.4 0.1 - 1.0 mg/dL    Alkaline Phosphatase 79 55 - 135 U/L    AST 79 (H) 10 - 40 U/L    ALT 82 (H) 10 - 44 U/L    Anion Gap 13 8 - 16 mmol/L    eGFR if African American >60.0 >60 mL/min/1.73 m^2    eGFR if non African American >60.0 >60 mL/min/1.73 m^2   Troponin I   Result Value Ref Range    Troponin I 0.007 0.000 - 0.026 ng/mL   Troponin I   Result Value Ref Range    Troponin I <0.006 0.000 - 0.026 ng/mL   CBC auto differential   Result Value Ref Range    WBC 12.17 3.90 - 12.70 K/uL    RBC 5.03 4.60 - 6.20 M/uL    Hemoglobin 15.2 14.0 - 18.0 g/dL    Hematocrit 45.2 40.0 - 54.0 %    MCV 90 82 - 98 fL    MCH 30.2 27.0 - 31.0 pg    MCHC 33.6 32.0 - 36.0 %    RDW 13.7 11.5 - 14.5 %    Platelets 289 150 - 350 K/uL    MPV 9.3 9.2 - 12.9 fL    Gran # 10.2 (H) 1.8 - 7.7 K/uL    Lymph # 1.6 1.0 - 4.8 K/uL    Mono # 0.4 0.3 - 1.0 K/uL    Eos # 0.0 0.0 - 0.5 K/uL    Baso # 0.02 0.00 - 0.20 K/uL    Gran% 84.0 (H) 38.0 - 73.0 %    Lymph% 12.9 (L) 18.0 - 48.0 %    Mono% 3.5 (L) 4.0 - 15.0  %    Eosinophil% 0.1 0.0 - 8.0 %    Basophil% 0.2 0.0 - 1.9 %    Platelet Estimate Appears normal     Differential Method Automated    Comprehensive metabolic panel   Result Value Ref Range    Sodium 138 136 - 145 mmol/L    Potassium 4.3 3.5 - 5.1 mmol/L    Chloride 100 95 - 110 mmol/L    CO2 28 23 - 29 mmol/L    Glucose 163 (H) 70 - 110 mg/dL    BUN, Bld 21 8 - 23 mg/dL    Creatinine 1.0 0.5 - 1.4 mg/dL    Calcium 9.8 8.7 - 10.5 mg/dL    Total Protein 7.6 6.0 - 8.4 g/dL    Albumin 3.8 3.5 - 5.2 g/dL    Total Bilirubin 0.4 0.1 - 1.0 mg/dL    Alkaline Phosphatase 71 55 - 135 U/L    AST 50 (H) 10 - 40 U/L    ALT 72 (H) 10 - 44 U/L    Anion Gap 10 8 - 16 mmol/L    eGFR if African American >60 >60 mL/min/1.73 m^2    eGFR if non African American >60 >60 mL/min/1.73 m^2   CBC auto differential   Result Value Ref Range    WBC 27.94 (H) 3.90 - 12.70 K/uL    RBC 5.00 4.60 - 6.20 M/uL    Hemoglobin 15.3 14.0 - 18.0 g/dL    Hematocrit 44.9 40.0 - 54.0 %    MCV 90 82 - 98 fL    MCH 30.6 27.0 - 31.0 pg    MCHC 34.1 32.0 - 36.0 %    RDW 13.7 11.5 - 14.5 %    Platelets 319 150 - 350 K/uL    MPV 9.5 9.2 - 12.9 fL    Gran # 24.5 (H) 1.8 - 7.7 K/uL    Lymph # 2.3 1.0 - 4.8 K/uL    Mono # 1.1 (H) 0.3 - 1.0 K/uL    Eos # 0.0 0.0 - 0.5 K/uL    Baso # 0.02 0.00 - 0.20 K/uL    Gran% 88.4 (H) 38.0 - 73.0 %    Lymph% 8.2 (L) 18.0 - 48.0 %    Mono% 3.9 (L) 4.0 - 15.0 %    Eosinophil% 0.0 0.0 - 8.0 %    Basophil% 0.1 0.0 - 1.9 %    Platelet Estimate Appears normal     Toxic Granulation Present     Differential Method Automated    Comprehensive metabolic panel   Result Value Ref Range    Sodium 140 136 - 145 mmol/L    Potassium 4.2 3.5 - 5.1 mmol/L    Chloride 101 95 - 110 mmol/L    CO2 27 23 - 29 mmol/L    Glucose 147 (H) 70 - 110 mg/dL    BUN, Bld 36 (H) 8 - 23 mg/dL    Creatinine 1.4 0.5 - 1.4 mg/dL    Calcium 9.5 8.7 - 10.5 mg/dL    Total Protein 7.5 6.0 - 8.4 g/dL    Albumin 3.8 3.5 - 5.2 g/dL    Total Bilirubin 0.4 0.1 - 1.0 mg/dL     Alkaline Phosphatase 68 55 - 135 U/L    AST 38 10 - 40 U/L    ALT 77 (H) 10 - 44 U/L    Anion Gap 12 8 - 16 mmol/L    eGFR if African American >60 >60 mL/min/1.73 m^2    eGFR if non African American 53 (A) >60 mL/min/1.73 m^2   ISTAT PROCEDURE   Result Value Ref Range    POC PH 7.422 7.35 - 7.45    POC PCO2 45.5 (H) 35 - 45 mmHg    POC PO2 62 (L) 80 - 100 mmHg    POC HCO3 29.6 (H) 24 - 28 mmol/L    POC BE 5 -2 to 2 mmol/L    POC SATURATED O2 92 (L) 95 - 100 %    Sample ARTERIAL     Site RR     Allens Test Pass     DelSys Room Air     Mode SPONT     FiO2 21          Imaging Results:  Imaging Results          X-Ray Chest PA And Lateral (Final result)  Result time 06/02/17 13:38:24    Final result by Kofi Poole MD (06/02/17 13:38:24)                 Impression:     Negative      Electronically signed by: KOFI POOLE MD  Date:     06/02/17  Time:    13:38              Narrative:    History: Dyspnea    Normal heart size. Clear lungs.                                EKG Readings: (Independently Interpreted)   Initial Reading: No STEMI. Rhythm: Normal Sinus Rhythm. Heart Rate: 72. Ectopy: No Ectopy. Conduction: Normal. Axis: Normal.        The Emergency Provider reviewed the vital signs and test results, which are outlined above.    ED Discussion         ED Medication(s):  Medications   albuterol-ipratropium 2.5mg-0.5mg/3mL nebulizer solution 3 mL (3 mLs Nebulization Given 6/2/17 0346)   predniSONE tablet 60 mg (60 mg Oral Given 6/2/17 1340)   alprazolam tablet 0.5 mg (0.5 mg Oral Given 6/2/17 0284)       Discharge Medication List as of 6/4/2017  2:23 PM      START taking these medications    Details   guaifenesin (MUCINEX) 600 mg 12 hr tablet Take 1 tablet (600 mg total) by mouth 2 (two) times daily as needed for Congestion., Starting Sun 6/4/2017, Until Wed 6/14/2017, OTC      levoFLOXacin (LEVAQUIN) 750 MG tablet Take 1 tablet (750 mg total) by mouth once daily., Starting Sun 6/4/2017, Until Sun 6/11/2017,  Print             Follow-up Information     [facility] Corley In 3 days.    Specialty:  Family Medicine  Why:  Hosp F/U - COPD Exacerbation and Anxiety  Contact information:  [address] [place] [place] [address] [phone]             Pulmonology In 2 weeks.    Why:  Please follow up with lung doctor regarding COPD and need for oxygen                   Medical Decision Making    Medical Decision Making:   Clinical Tests:   Lab Tests: Ordered and Reviewed  The following lab test(s) were unremarkable: CBC, CMP and Troponin       <> Summary of Lab: ABG  Radiological Study: Ordered and Reviewed  Medical Tests: Ordered and Reviewed   14:29 PM - Re-evaluation:  The patient is resting comfortably and is still in some respiratory distress. He states that his symptoms have only slightly improved after treatment within ER. Discussed test results and notified of pending labs. Answered questions at this time.     15:30 PM - Re-evaluation:  Discussed test results, shared treatment plan, and the need for admission with patient and family. They understand and agree to the plan as discussed. Answered questions at this time.     15:35 PM - CONSULT: I discussed the case with hospital medicine. Agrees with current management. Recommends admission  Admitting Service: Hospital Medicine   Admitting Physician: Zayda  Admit to tele       Scribe Attestation:   Scribe #1: I performed the above scribed service and the documentation accurately describes the services I performed. I attest to the accuracy of the note.    Attending:   Physician Attestation Statement for Scribe #1: I, No att. providers found, personally performed the services described in this documentation, as scribed by Jluis Miller, in my presence, and it is both accurate and complete.          Clinical Impression       ICD-10-CM ICD-9-CM   1. COPD exacerbationAcute J44.1 491.21   2. Dyspnea R06.00 786.09   3. Hypoxia R09.02 799.02       Disposition:    Disposition: Admitted  Condition: Jacklyn Miller MD  06/19/17 2249

## 2017-06-02 NOTE — PROGRESS NOTES
Pt ambulated on room air around the ER to evaluate oxygenation. O2 sat dropped to 85% at the lowest with exercise. O2 sat up to 93% resting. Pt placed on NC 3lpm. This reported to Dr. Miller and TC Garcia.

## 2017-06-03 PROBLEM — F41.9 ANXIETY: Status: ACTIVE | Noted: 2017-06-03

## 2017-06-03 PROBLEM — Z72.0 TOBACCO ABUSE: Status: ACTIVE | Noted: 2017-06-03

## 2017-06-03 LAB
ALBUMIN SERPL BCP-MCNC: 3.8 G/DL
ALP SERPL-CCNC: 71 U/L
ALT SERPL W/O P-5'-P-CCNC: 72 U/L
ANION GAP SERPL CALC-SCNC: 10 MMOL/L
AST SERPL-CCNC: 50 U/L
BASOPHILS # BLD AUTO: 0.02 K/UL
BASOPHILS NFR BLD: 0.2 %
BILIRUB SERPL-MCNC: 0.4 MG/DL
BUN SERPL-MCNC: 21 MG/DL
CALCIUM SERPL-MCNC: 9.8 MG/DL
CHLORIDE SERPL-SCNC: 100 MMOL/L
CO2 SERPL-SCNC: 28 MMOL/L
CREAT SERPL-MCNC: 1 MG/DL
DIFFERENTIAL METHOD: ABNORMAL
EOSINOPHIL # BLD AUTO: 0 K/UL
EOSINOPHIL NFR BLD: 0.1 %
ERYTHROCYTE [DISTWIDTH] IN BLOOD BY AUTOMATED COUNT: 13.7 %
EST. GFR  (AFRICAN AMERICAN): >60 ML/MIN/1.73 M^2
EST. GFR  (NON AFRICAN AMERICAN): >60 ML/MIN/1.73 M^2
GLUCOSE SERPL-MCNC: 163 MG/DL
HCT VFR BLD AUTO: 45.2 %
HGB BLD-MCNC: 15.2 G/DL
LYMPHOCYTES # BLD AUTO: 1.6 K/UL
LYMPHOCYTES NFR BLD: 12.9 %
MCH RBC QN AUTO: 30.2 PG
MCHC RBC AUTO-ENTMCNC: 33.6 %
MCV RBC AUTO: 90 FL
MONOCYTES # BLD AUTO: 0.4 K/UL
MONOCYTES NFR BLD: 3.5 %
NEUTROPHILS # BLD AUTO: 10.2 K/UL
NEUTROPHILS NFR BLD: 84 %
PLATELET # BLD AUTO: 289 K/UL
PLATELET BLD QL SMEAR: ABNORMAL
PMV BLD AUTO: 9.3 FL
POTASSIUM SERPL-SCNC: 4.3 MMOL/L
PROT SERPL-MCNC: 7.6 G/DL
RBC # BLD AUTO: 5.03 M/UL
SODIUM SERPL-SCNC: 138 MMOL/L
TROPONIN I SERPL DL<=0.01 NG/ML-MCNC: <0.006 NG/ML
WBC # BLD AUTO: 12.17 K/UL

## 2017-06-03 PROCEDURE — 94664 DEMO&/EVAL PT USE INHALER: CPT

## 2017-06-03 PROCEDURE — 36415 COLL VENOUS BLD VENIPUNCTURE: CPT

## 2017-06-03 PROCEDURE — 25000003 PHARM REV CODE 250: Performed by: FAMILY MEDICINE

## 2017-06-03 PROCEDURE — 27000221 HC OXYGEN, UP TO 24 HOURS

## 2017-06-03 PROCEDURE — 94640 AIRWAY INHALATION TREATMENT: CPT

## 2017-06-03 PROCEDURE — 94761 N-INVAS EAR/PLS OXIMETRY MLT: CPT

## 2017-06-03 PROCEDURE — 25000003 PHARM REV CODE 250: Performed by: INTERNAL MEDICINE

## 2017-06-03 PROCEDURE — 63600175 PHARM REV CODE 636 W HCPCS: Performed by: INTERNAL MEDICINE

## 2017-06-03 PROCEDURE — 80053 COMPREHEN METABOLIC PANEL: CPT

## 2017-06-03 PROCEDURE — 25000242 PHARM REV CODE 250 ALT 637 W/ HCPCS: Performed by: EMERGENCY MEDICINE

## 2017-06-03 PROCEDURE — 85025 COMPLETE CBC W/AUTO DIFF WBC: CPT

## 2017-06-03 PROCEDURE — 94799 UNLISTED PULMONARY SVC/PX: CPT

## 2017-06-03 PROCEDURE — 21400001 HC TELEMETRY ROOM

## 2017-06-03 PROCEDURE — 25000003 PHARM REV CODE 250: Performed by: NURSE PRACTITIONER

## 2017-06-03 PROCEDURE — 63600175 PHARM REV CODE 636 W HCPCS: Performed by: NURSE PRACTITIONER

## 2017-06-03 PROCEDURE — 63600175 PHARM REV CODE 636 W HCPCS: Performed by: EMERGENCY MEDICINE

## 2017-06-03 RX ORDER — ALPRAZOLAM 1 MG/1
1 TABLET ORAL 3 TIMES DAILY
Status: DISCONTINUED | OUTPATIENT
Start: 2017-06-03 | End: 2017-06-04 | Stop reason: HOSPADM

## 2017-06-03 RX ORDER — MOXIFLOXACIN HYDROCHLORIDE 400 MG/250ML
400 INJECTION, SOLUTION INTRAVENOUS
Status: DISCONTINUED | OUTPATIENT
Start: 2017-06-03 | End: 2017-06-04 | Stop reason: HOSPADM

## 2017-06-03 RX ORDER — PANTOPRAZOLE SODIUM 40 MG/1
40 TABLET, DELAYED RELEASE ORAL DAILY
Status: DISCONTINUED | OUTPATIENT
Start: 2017-06-03 | End: 2017-06-04 | Stop reason: HOSPADM

## 2017-06-03 RX ORDER — GUAIFENESIN 600 MG/1
1200 TABLET, EXTENDED RELEASE ORAL 2 TIMES DAILY
Status: DISCONTINUED | OUTPATIENT
Start: 2017-06-03 | End: 2017-06-04 | Stop reason: HOSPADM

## 2017-06-03 RX ORDER — FLUTICASONE PROPIONATE 50 MCG
2 SPRAY, SUSPENSION (ML) NASAL DAILY
Status: DISCONTINUED | OUTPATIENT
Start: 2017-06-03 | End: 2017-06-04 | Stop reason: HOSPADM

## 2017-06-03 RX ORDER — BUDESONIDE 0.5 MG/2ML
0.5 INHALANT ORAL EVERY 12 HOURS
Status: DISCONTINUED | OUTPATIENT
Start: 2017-06-03 | End: 2017-06-04 | Stop reason: HOSPADM

## 2017-06-03 RX ORDER — ARFORMOTEROL TARTRATE 15 UG/2ML
15 SOLUTION RESPIRATORY (INHALATION) EVERY 12 HOURS
Status: DISCONTINUED | OUTPATIENT
Start: 2017-06-03 | End: 2017-06-04 | Stop reason: HOSPADM

## 2017-06-03 RX ORDER — ARFORMOTEROL TARTRATE 15 UG/2ML
15 SOLUTION RESPIRATORY (INHALATION) EVERY 12 HOURS
Status: DISCONTINUED | OUTPATIENT
Start: 2017-06-03 | End: 2017-06-03

## 2017-06-03 RX ORDER — ACETAMINOPHEN 325 MG/1
650 TABLET ORAL EVERY 6 HOURS PRN
Status: DISCONTINUED | OUTPATIENT
Start: 2017-06-03 | End: 2017-06-04 | Stop reason: HOSPADM

## 2017-06-03 RX ORDER — MAG HYDROX/ALUMINUM HYD/SIMETH 200-200-20
30 SUSPENSION, ORAL (FINAL DOSE FORM) ORAL EVERY 6 HOURS PRN
Status: DISCONTINUED | OUTPATIENT
Start: 2017-06-03 | End: 2017-06-04 | Stop reason: HOSPADM

## 2017-06-03 RX ORDER — BUDESONIDE 0.5 MG/2ML
0.5 INHALANT ORAL EVERY 12 HOURS
Status: DISCONTINUED | OUTPATIENT
Start: 2017-06-03 | End: 2017-06-03

## 2017-06-03 RX ORDER — ONDANSETRON 2 MG/ML
4 INJECTION INTRAMUSCULAR; INTRAVENOUS EVERY 8 HOURS PRN
Status: DISCONTINUED | OUTPATIENT
Start: 2017-06-03 | End: 2017-06-04 | Stop reason: HOSPADM

## 2017-06-03 RX ADMIN — METHYLPREDNISOLONE SODIUM SUCCINATE 80 MG: 125 INJECTION, POWDER, FOR SOLUTION INTRAMUSCULAR; INTRAVENOUS at 11:06

## 2017-06-03 RX ADMIN — METHYLPREDNISOLONE SODIUM SUCCINATE 80 MG: 125 INJECTION, POWDER, FOR SOLUTION INTRAMUSCULAR; INTRAVENOUS at 12:06

## 2017-06-03 RX ADMIN — ALPRAZOLAM 1 MG: 1 TABLET ORAL at 09:06

## 2017-06-03 RX ADMIN — BUDESONIDE 0.5 MG: 0.5 SUSPENSION RESPIRATORY (INHALATION) at 07:06

## 2017-06-03 RX ADMIN — MOXIFLOXACIN HYDROCHLORIDE 400 MG: 400 INJECTION, SOLUTION INTRAVENOUS at 09:06

## 2017-06-03 RX ADMIN — ALPRAZOLAM 1 MG: 1 TABLET ORAL at 01:06

## 2017-06-03 RX ADMIN — IPRATROPIUM BROMIDE AND ALBUTEROL SULFATE 3 ML: .5; 3 SOLUTION RESPIRATORY (INHALATION) at 12:06

## 2017-06-03 RX ADMIN — PANTOPRAZOLE SODIUM 1200 MG: 40 TABLET, DELAYED RELEASE ORAL at 09:06

## 2017-06-03 RX ADMIN — IPRATROPIUM BROMIDE AND ALBUTEROL SULFATE 3 ML: .5; 3 SOLUTION RESPIRATORY (INHALATION) at 07:06

## 2017-06-03 RX ADMIN — IPRATROPIUM BROMIDE AND ALBUTEROL SULFATE 3 ML: .5; 3 SOLUTION RESPIRATORY (INHALATION) at 04:06

## 2017-06-03 RX ADMIN — PANTOPRAZOLE SODIUM 600 MG: 40 TABLET, DELAYED RELEASE ORAL at 08:06

## 2017-06-03 RX ADMIN — METHYLPREDNISOLONE SODIUM SUCCINATE 80 MG: 125 INJECTION, POWDER, FOR SOLUTION INTRAMUSCULAR; INTRAVENOUS at 05:06

## 2017-06-03 RX ADMIN — ASPIRIN 81 MG CHEWABLE TABLET 81 MG: 81 TABLET CHEWABLE at 08:06

## 2017-06-03 RX ADMIN — FLUTICASONE PROPIONATE 2 SPRAY: 50 SPRAY, METERED NASAL at 09:06

## 2017-06-03 RX ADMIN — PANTOPRAZOLE SODIUM 40 MG: 40 TABLET, DELAYED RELEASE ORAL at 09:06

## 2017-06-03 RX ADMIN — ARFORMOTEROL TARTRATE 15 MCG: 15 SOLUTION RESPIRATORY (INHALATION) at 07:06

## 2017-06-03 RX ADMIN — LISINOPRIL 10 MG: 10 TABLET ORAL at 08:06

## 2017-06-03 NOTE — H&P
Chief complaint: SOB  PHI: This patient came before my shift and patient is assigned to me for admit. Patient is admit from [facility]. I see and exam patient at bedside. Patient Eber Wilson is a 62 y.o. male patient who presents to the Emergency Department for complaints of shortness of breath.  The patient notes that he's been having some shortness of breath over the last week.  The patient states that he was seen yesterday here and evaluated and diagnosed with bronchitis.  The patient was seen by his PCP a few days ago and was prescribed a Z-Carter, Ventolin inhaler, and prednisone.  The patient notes that he is finished his antibiotics and has given himself an inhaler treatments at home.  The patient notes that the shortness of breath has not improved very much and has back here in the ED for further evaluation treatment.  It is noted that on triage the patient has SPO2 value of 88% on room air.  A repeat SPO2 value done while the patient is supine in bed is 93%.  The patient denies any fever chills nausea or vomiting.  The patient denies any chest pain or near syncope.  The patient also endorses that he still smokes and is actively smoking with his concerns of dyspnea.  There are no other major concerns or complaints noted at this time.    Patient says he feels much better now compares to the time when he present to the ER. He denies CP/SOB/HA/Blurred vision now.      Allergy: Amoxicillin, PCN, Adhesive     Past Medical History:       Past Medical History:   Diagnosis Date    Anxiety      COPD (chronic obstructive pulmonary disease)      Diverticular disease      Hypertension           Past Surgical History:        Past Surgical History:   Procedure Laterality Date    COLOSTOMY CLOSURE        HERNIA REPAIR        LAPAROSCOPIC COLOSTOMY              Family History:        Family History   Problem Relation Age of Onset    COPD Mother      COPD Father           Social History:  Social History             Social History Main Topics    Smoking status: Current Every Day Smoker       Packs/day: 2.00       Types: Cigarettes    Smokeless tobacco: Never Used    Alcohol use No    Drug use: No    Sexual activity: Yes       Partners: Female         ROS   Review of Systems   Constitutional: Negative for fever.   HENT: Negative for sore throat.    Respiratory: Positive for cough, shortness of breath and wheezing.    Cardiovascular: Negative for chest pain.   Gastrointestinal: Negative for nausea and vomiting.   Genitourinary: Negative for dysuria.   Musculoskeletal: Negative for back pain.   Skin: Negative for rash.   Neurological: Negative for weakness.   Hematological: Does not bruise/bleed easily.   All other systems reviewed and are negative.        Physical Exam              Initial Vitals   BP Pulse Resp Temp SpO2   06/02/17 1311 06/02/17 1311 06/02/17 1311 06/02/17 1311 06/02/17 1313   132/81 90 (!) 22 98.6 °F (37 °C) (!) 88 %      Physical Exam  Nursing Notes and Vital Signs Reviewed.  Constitutional: Patient is in on NC 2 litters Oxygen. Well-developed and well-nourished.  Head: Atraumatic. Normocephalic.  Eyes: PERRL. EOM intact. Conjunctivae are not pale. No scleral icterus.  ENT: Mucous membranes are moist. Oropharynx is clear and symmetric.    Neck: Supple. Full ROM. No lymphadenopathy.  Cardiovascular: Regular rate. Regular rhythm. No murmurs, rubs, or gallops. Distal pulses are 2+ and symmetric.  Pulmonary/Chest: No respiratory distress.  Noted wheezing on auscultation bilaterally. No rales or rhonchi.  Abdominal: Soft and non-distended.  There is no tenderness.  No rebound, guarding, or rigidity. Good bowel sounds.  Genitourinary: No CVA tenderness  Musculoskeletal: Moves all extremities. No obvious deformities. No edema. No calf tenderness.  Skin: Warm and dry.  Neurological:  Alert, awake, and appropriate.  Normal speech.  No acute focal neurological deficits are appreciated.  Psychiatric: Normal  "affect. Good eye contact. Appropriate in content.     ED Course    Procedures  ED Vital Signs:         Vitals:     06/02/17 1311 06/02/17 1313 06/02/17 1345 06/02/17 1350   BP: 132/81         Pulse: 90   90 92   Resp: (!) 22   20 20   Temp: 98.6 °F (37 °C)         TempSrc: Oral         SpO2:   (!) 88% 98% 98%   Weight: 96.3 kg (212 lb 3.2 oz)         Height: 5' 8" (1.727 m)           06/02/17 1356   BP:     Pulse: 99   Resp: 18   Temp:     TempSrc:     SpO2: 98%   Weight:     Height:           Abnormal Lab Results in ER:        Labs Reviewed   ISTAT PROCEDURE - Abnormal; Notable for the following:        Result Value      POC PCO2 45.5 (*)       POC PO2 62 (*)       POC HCO3 29.6 (*)       POC SATURATED O2 92 (*)       All other components within normal limits         All Lab Results:        Results for orders placed or performed during the hospital encounter of 06/02/17   ISTAT PROCEDURE   Result Value Ref Range     POC PH 7.422 7.35 - 7.45     POC PCO2 45.5 (H) 35 - 45 mmHg     POC PO2 62 (L) 80 - 100 mmHg     POC HCO3 29.6 (H) 24 - 28 mmol/L     POC BE 5 -2 to 2 mmol/L     POC SATURATED O2 92 (L) 95 - 100 %     Sample ARTERIAL       Site RR       Allens Test Pass       DelSys Room Air       Mode SPONT       FiO2 21              Imaging Results:      Imaging Results                   X-Ray Chest PA And Lateral (Final result)  Result time 06/02/17 13:38:24                Final result by Kofi Poole MD (06/02/17 13:38:24)                             Impression:      Negative        Electronically signed by:               KOFI POOLE MD  Date:                                                                                    06/02/17  Time:                                                                           13:38                            Narrative:     History: Dyspnea     Normal heart size. Clear lungs.                                              EKG Readings: (Independently Interpreted) "   Initial Reading: No STEMI. Rhythm: Normal Sinus Rhythm. Heart Rate: 72. Ectopy: No Ectopy. Conduction: Normal. Axis: Normal.               Clinical Impression          ICD-10-CM ICD-9-CM   1. Dyspnea R06.00 786.09   2.  Hyperglycemia  3.  Abnormal LFTs   4. Anxiety     Assessment:    Give oxygen, bedrest, IVF, breathing treatment as needed, repeat lab for hepatitis work up, low sugar diet.

## 2017-06-03 NOTE — SUBJECTIVE & OBJECTIVE
Review of Systems   Constitutional: Negative for activity change, appetite change, chills, fatigue and fever.   HENT: Positive for ear pain. Negative for rhinorrhea and sinus pressure.    Eyes: Negative for pain and redness.   Respiratory: Positive for cough, shortness of breath and wheezing.    Cardiovascular: Negative for chest pain.   Gastrointestinal: Negative for abdominal pain, diarrhea, nausea and vomiting.   Genitourinary: Negative for dysuria, frequency and hematuria.   Musculoskeletal: Negative for gait problem.   Skin: Negative for pallor, rash and wound.   Neurological: Negative for dizziness and headaches.   Psychiatric/Behavioral: Negative for confusion.     Objective:     Vital Signs (Most Recent):  Temp: 97.4 °F (36.3 °C) (06/03/17 1200)  Pulse: 76 (06/03/17 1243)  Resp: 18 (06/03/17 1243)  BP: 136/84 (06/03/17 1200)  SpO2: 96 % (06/03/17 1243) Vital Signs (24h Range):  Temp:  [97.4 °F (36.3 °C)-99 °F (37.2 °C)] 97.4 °F (36.3 °C)  Pulse:  [55-88] 76  Resp:  [16-21] 18  SpO2:  [95 %-98 %] 96 %  BP: (133-157)/(73-95) 136/84     Weight: 96.3 kg (212 lb 3.2 oz)  Body mass index is 32.26 kg/m².    Intake/Output Summary (Last 24 hours) at 06/03/17 1451  Last data filed at 06/03/17 0328   Gross per 24 hour   Intake               60 ml   Output                0 ml   Net               60 ml      Physical Exam   Constitutional: He is oriented to person, place, and time. He appears well-developed and well-nourished.   HENT:   Head: Normocephalic and atraumatic.   Eyes: Conjunctivae are normal. No scleral icterus.   Neck: Normal range of motion. Neck supple.   Cardiovascular: Normal rate, regular rhythm and normal heart sounds.    No murmur heard.  Pulmonary/Chest: Effort normal. No respiratory distress. He has wheezes. He has rhonchi.   Abdominal: Soft. Bowel sounds are normal.   Musculoskeletal: Normal range of motion. He exhibits no edema or tenderness.   Neurological: He is alert and oriented to person,  place, and time.   Skin: Skin is warm and dry.   Psychiatric: He has a normal mood and affect. His behavior is normal.   Nursing note and vitals reviewed.      Significant Labs:   CBC:   Recent Labs  Lab 06/02/17  1509 06/03/17  0530   WBC 13.37* 12.17   HGB 15.6 15.2   HCT 48.0 45.2    289     CMP:   Recent Labs  Lab 06/02/17  1509 06/03/17  0530    138   K 3.7 4.3   CL 99 100   CO2 28 28   * 163*   BUN 18 21   CREATININE 1.1 1.0   CALCIUM 10.1 9.8   PROT 8.3 7.6   ALBUMIN 4.3 3.8   BILITOT 0.4 0.4   ALKPHOS 79 71   AST 79* 50*   ALT 82* 72*   ANIONGAP 13 10   EGFRNONAA >60.0 >60     All pertinent labs within the past 24 hours have been reviewed.    Significant Imaging: I have reviewed all pertinent imaging results/findings within the past 24 hours.

## 2017-06-03 NOTE — PLAN OF CARE
Problem: Patient Care Overview  Goal: Plan of Care Review  Outcome: Ongoing (interventions implemented as appropriate)  Patient had uneventful shift. Patient awake, alert and oriented x 4. VS stable. Patient denies pain or SOB. Patient on telemetry, NSR on the monitor. Patient ambulates with assistance. Fall precautions in place. Patient free from fall/injury. Plan of care reviewed with patient. Patient has no questions at this time. Will continue to monitor.

## 2017-06-03 NOTE — ASSESSMENT & PLAN NOTE
COPD - oxygen therapy to maintain sats > 92 %, budesonide treatments, Solumedrol 80 mg IV every 8 hours, Avelox and Duo Nebs  Formoterol

## 2017-06-03 NOTE — PLAN OF CARE
Problem: Patient Care Overview  Goal: Plan of Care Review  Outcome: Ongoing (interventions implemented as appropriate)  Plan of care reviewed with patient. AAO x3. V/S stable. Patient on telemetry NS rhythm noted. On 2 LNC. Fall precautions in place, patient free from fall/injury. Patient has no questions at this time. Will continue to monitor.

## 2017-06-03 NOTE — HPI
Eber Wilson is a 62 y.o. male patient who presented to the Emergency Department for complaints of shortness of breath.  The patient noted that he's been having some shortness of breath over the last week.  The patient was seen by his PCP a few days ago and was prescribed a Z-Carter, Ventolin inhaler, and prednisone.  The patient noted that he was finished his antibiotics and has given himself an inhaler treatments at home.  The patient noted that the shortness of breath has not improved very much and has back here in the ED for further evaluation treatment.  It is noted that on triage the patient has SPO2 value of 88% on room air.  A repeat SPO2 value done while the patient is supine in bed is 93%

## 2017-06-03 NOTE — PLAN OF CARE
Problem: Patient Care Overview  Goal: Plan of Care Review  Outcome: Ongoing (interventions implemented as appropriate)  o2 sat on nc 2l/m=95%; tolerates txs well.

## 2017-06-03 NOTE — HOSPITAL COURSE
Pt was admitted with COPD Exacerbation. Interventions included supplemental oxygen, IV corticosteroids, SHRUTI, LABA and IV antibiotics. Scheduled Xanax assisted with anxiety. Pt was advised regarding smoking cessation. SOB and URI symptoms improved. He was prescribed Levaquin and a longer steroid taper. He did qualify for home oxygen which was arranged and O2 was delivered to hospital prior to discharge. He was advised to follow up with PCP and obtain evaluation by Pulmonolologist.

## 2017-06-03 NOTE — PROGRESS NOTES
[facility] Hospital Medicine  Progress Note    Patient Name: Eber Wilson  MRN: [id number]  Patient Class: IP- Inpatient   Admission Date: 6/2/2017  Length of Stay: 1 days  Attending Physician: Juwan Priest MD  Primary Care Provider: JOHN Corley        Subjective:     Principal Problem:  COPD Exacerbation    HPI:  Patient Eber Wilson is a 62 y.o. male patient who presents to the Emergency Department for complaints of shortness of breath.  The patient notes that he's been having some shortness of breath over the last week.  The patient states that he was seen yesterday here and evaluated and diagnosed with bronchitis.  The patient was seen by his PCP a few days ago and was prescribed a Z-Carter, Ventolin inhaler, and prednisone.  The patient notes that he is finished his antibiotics and has given himself an inhaler treatments at home.  The patient notes that the shortness of breath has not improved very much and has back here in the ED for further evaluation treatment.  It is noted that on triage the patient has SPO2 value of 88% on room air.  A repeat SPO2 value done while the patient is supine in bed is 93%    Hospital Course:  Interventions included supplemental oxygen, IV corticosteroids, SHRUTI, LABA and IV antibiotics. Scheduled Xanax ordered for anxiety.         Review of Systems   Constitutional: Negative for activity change, appetite change, chills, fatigue and fever.   HENT: Positive for ear pain. Negative for rhinorrhea and sinus pressure.    Eyes: Negative for pain and redness.   Respiratory: Positive for cough, shortness of breath and wheezing.    Cardiovascular: Negative for chest pain.   Gastrointestinal: Negative for abdominal pain, diarrhea, nausea and vomiting.   Genitourinary: Negative for dysuria, frequency and hematuria.   Musculoskeletal: Negative for gait problem.   Skin: Negative for pallor, rash and wound.   Neurological: Negative for dizziness and  headaches.   Psychiatric/Behavioral: Negative for confusion.     Objective:     Vital Signs (Most Recent):  Temp: 97.4 °F (36.3 °C) (06/03/17 1200)  Pulse: 76 (06/03/17 1243)  Resp: 18 (06/03/17 1243)  BP: 136/84 (06/03/17 1200)  SpO2: 96 % (06/03/17 1243) Vital Signs (24h Range):  Temp:  [97.4 °F (36.3 °C)-99 °F (37.2 °C)] 97.4 °F (36.3 °C)  Pulse:  [55-88] 76  Resp:  [16-21] 18  SpO2:  [95 %-98 %] 96 %  BP: (133-157)/(73-95) 136/84     Weight: 96.3 kg (212 lb 3.2 oz)  Body mass index is 32.26 kg/m².    Intake/Output Summary (Last 24 hours) at 06/03/17 1451  Last data filed at 06/03/17 0328   Gross per 24 hour   Intake               60 ml   Output                0 ml   Net               60 ml      Physical Exam   Constitutional: He is oriented to person, place, and time. He appears well-developed and well-nourished.   HENT:   Head: Normocephalic and atraumatic.   Eyes: Conjunctivae are normal. No scleral icterus.   Neck: Normal range of motion. Neck supple.   Cardiovascular: Normal rate, regular rhythm and normal heart sounds.    No murmur heard.  Pulmonary/Chest: Effort normal. No respiratory distress. He has wheezes. He has rhonchi.   Abdominal: Soft. Bowel sounds are normal.   Musculoskeletal: Normal range of motion. He exhibits no edema or tenderness.   Neurological: He is alert and oriented to person, place, and time.   Skin: Skin is warm and dry.   Psychiatric: He has a normal mood and affect. His behavior is normal.   Nursing note and vitals reviewed.      Significant Labs:   CBC:   Recent Labs  Lab 06/02/17  1509 06/03/17  0530   WBC 13.37* 12.17   HGB 15.6 15.2   HCT 48.0 45.2    289     CMP:   Recent Labs  Lab 06/02/17  1509 06/03/17  0530    138   K 3.7 4.3   CL 99 100   CO2 28 28   * 163*   BUN 18 21   CREATININE 1.1 1.0   CALCIUM 10.1 9.8   PROT 8.3 7.6   ALBUMIN 4.3 3.8   BILITOT 0.4 0.4   ALKPHOS 79 71   AST 79* 50*   ALT 82* 72*   ANIONGAP 13 10   EGFRNONAA >60.0 >60     All  pertinent labs within the past 24 hours have been reviewed.    Significant Imaging: I have reviewed all pertinent imaging results/findings within the past 24 hours.    Assessment/Plan:      Anxiety    Daughter states patient is scheduled to follow up with PCP this week regarding anxiety          Tobacco abuse    Smoking cessation advised          COPD exacerbation    COPD - oxygen therapy to maintain sats > 92 %, budesonide treatments, Solumedrol 80 mg IV every 8 hours, Avelox and Duo Nebs  Formoterol            IF respiratory status/oxygenation and cough improved - discharge likely 6/4/17    VTE Risk Mitigation         Ordered     Medium Risk of VTE  Once      06/02/17 1933     Place sequential compression device  Until discontinued      06/02/17 1933            Amber Mariscal NP  Department of Hospital Medicine   [facility]

## 2017-06-04 VITALS
HEIGHT: 68 IN | SYSTOLIC BLOOD PRESSURE: 133 MMHG | HEART RATE: 87 BPM | BODY MASS INDEX: 32.16 KG/M2 | TEMPERATURE: 98 F | WEIGHT: 212.19 LBS | OXYGEN SATURATION: 95 % | DIASTOLIC BLOOD PRESSURE: 79 MMHG | RESPIRATION RATE: 20 BRPM

## 2017-06-04 PROBLEM — R09.02 HYPOXIA: Status: ACTIVE | Noted: 2017-06-04

## 2017-06-04 LAB
ALBUMIN SERPL BCP-MCNC: 3.8 G/DL
ALP SERPL-CCNC: 68 U/L
ALT SERPL W/O P-5'-P-CCNC: 77 U/L
ANION GAP SERPL CALC-SCNC: 12 MMOL/L
AST SERPL-CCNC: 38 U/L
BASOPHILS # BLD AUTO: 0.02 K/UL
BASOPHILS NFR BLD: 0.1 %
BILIRUB SERPL-MCNC: 0.4 MG/DL
BUN SERPL-MCNC: 36 MG/DL
CALCIUM SERPL-MCNC: 9.5 MG/DL
CHLORIDE SERPL-SCNC: 101 MMOL/L
CO2 SERPL-SCNC: 27 MMOL/L
CREAT SERPL-MCNC: 1.4 MG/DL
DIFFERENTIAL METHOD: ABNORMAL
EOSINOPHIL # BLD AUTO: 0 K/UL
EOSINOPHIL NFR BLD: 0 %
ERYTHROCYTE [DISTWIDTH] IN BLOOD BY AUTOMATED COUNT: 13.7 %
EST. GFR  (AFRICAN AMERICAN): >60 ML/MIN/1.73 M^2
EST. GFR  (NON AFRICAN AMERICAN): 53 ML/MIN/1.73 M^2
GLUCOSE SERPL-MCNC: 147 MG/DL
HCT VFR BLD AUTO: 44.9 %
HGB BLD-MCNC: 15.3 G/DL
LYMPHOCYTES # BLD AUTO: 2.3 K/UL
LYMPHOCYTES NFR BLD: 8.2 %
MCH RBC QN AUTO: 30.6 PG
MCHC RBC AUTO-ENTMCNC: 34.1 %
MCV RBC AUTO: 90 FL
MONOCYTES # BLD AUTO: 1.1 K/UL
MONOCYTES NFR BLD: 3.9 %
NEUTROPHILS # BLD AUTO: 24.5 K/UL
NEUTROPHILS NFR BLD: 88.4 %
PLATELET # BLD AUTO: 319 K/UL
PLATELET BLD QL SMEAR: ABNORMAL
PMV BLD AUTO: 9.5 FL
POTASSIUM SERPL-SCNC: 4.2 MMOL/L
PROT SERPL-MCNC: 7.5 G/DL
RBC # BLD AUTO: 5 M/UL
SODIUM SERPL-SCNC: 140 MMOL/L
TOXIC GRANULES BLD QL SMEAR: PRESENT
WBC # BLD AUTO: 27.94 K/UL

## 2017-06-04 PROCEDURE — 80053 COMPREHEN METABOLIC PANEL: CPT

## 2017-06-04 PROCEDURE — 94664 DEMO&/EVAL PT USE INHALER: CPT

## 2017-06-04 PROCEDURE — 63600175 PHARM REV CODE 636 W HCPCS: Performed by: NURSE PRACTITIONER

## 2017-06-04 PROCEDURE — 25000003 PHARM REV CODE 250: Performed by: FAMILY MEDICINE

## 2017-06-04 PROCEDURE — 94799 UNLISTED PULMONARY SVC/PX: CPT

## 2017-06-04 PROCEDURE — 63600175 PHARM REV CODE 636 W HCPCS: Performed by: EMERGENCY MEDICINE

## 2017-06-04 PROCEDURE — 25000003 PHARM REV CODE 250: Performed by: INTERNAL MEDICINE

## 2017-06-04 PROCEDURE — 85025 COMPLETE CBC W/AUTO DIFF WBC: CPT

## 2017-06-04 PROCEDURE — 63600175 PHARM REV CODE 636 W HCPCS: Performed by: INTERNAL MEDICINE

## 2017-06-04 PROCEDURE — 27000221 HC OXYGEN, UP TO 24 HOURS

## 2017-06-04 PROCEDURE — 25000003 PHARM REV CODE 250: Performed by: NURSE PRACTITIONER

## 2017-06-04 PROCEDURE — 94761 N-INVAS EAR/PLS OXIMETRY MLT: CPT

## 2017-06-04 PROCEDURE — 25000242 PHARM REV CODE 250 ALT 637 W/ HCPCS: Performed by: EMERGENCY MEDICINE

## 2017-06-04 PROCEDURE — 36415 COLL VENOUS BLD VENIPUNCTURE: CPT

## 2017-06-04 PROCEDURE — 94640 AIRWAY INHALATION TREATMENT: CPT

## 2017-06-04 RX ORDER — LEVOFLOXACIN 750 MG/1
750 TABLET ORAL DAILY
Qty: 7 TABLET | Refills: 0 | Status: SHIPPED | OUTPATIENT
Start: 2017-06-04 | End: 2017-06-11

## 2017-06-04 RX ORDER — PREDNISONE 10 MG/1
TABLET ORAL
Qty: 32 TABLET | Refills: 0 | Status: SHIPPED | OUTPATIENT
Start: 2017-06-04 | End: 2017-09-30

## 2017-06-04 RX ORDER — GUAIFENESIN 600 MG/1
600 TABLET, EXTENDED RELEASE ORAL 2 TIMES DAILY PRN
COMMUNITY
Start: 2017-06-04 | End: 2017-06-14

## 2017-06-04 RX ORDER — ALPRAZOLAM 0.5 MG/1
0.5 TABLET ORAL 3 TIMES DAILY PRN
Qty: 12 TABLET | Refills: 0 | Status: SHIPPED | OUTPATIENT
Start: 2017-06-04 | End: 2024-02-03

## 2017-06-04 RX ADMIN — ARFORMOTEROL TARTRATE 15 MCG: 15 SOLUTION RESPIRATORY (INHALATION) at 07:06

## 2017-06-04 RX ADMIN — METHYLPREDNISOLONE SODIUM SUCCINATE 40 MG: 40 INJECTION, POWDER, FOR SOLUTION INTRAMUSCULAR; INTRAVENOUS at 08:06

## 2017-06-04 RX ADMIN — LISINOPRIL 10 MG: 10 TABLET ORAL at 08:06

## 2017-06-04 RX ADMIN — IPRATROPIUM BROMIDE AND ALBUTEROL SULFATE 3 ML: .5; 3 SOLUTION RESPIRATORY (INHALATION) at 11:06

## 2017-06-04 RX ADMIN — IPRATROPIUM BROMIDE AND ALBUTEROL SULFATE 3 ML: .5; 3 SOLUTION RESPIRATORY (INHALATION) at 12:06

## 2017-06-04 RX ADMIN — ALPRAZOLAM 1 MG: 1 TABLET ORAL at 05:06

## 2017-06-04 RX ADMIN — PANTOPRAZOLE SODIUM 1200 MG: 40 TABLET, DELAYED RELEASE ORAL at 08:06

## 2017-06-04 RX ADMIN — MOXIFLOXACIN HYDROCHLORIDE 400 MG: 400 INJECTION, SOLUTION INTRAVENOUS at 09:06

## 2017-06-04 RX ADMIN — ALPRAZOLAM 1 MG: 1 TABLET ORAL at 02:06

## 2017-06-04 RX ADMIN — FLUTICASONE PROPIONATE 2 SPRAY: 50 SPRAY, METERED NASAL at 08:06

## 2017-06-04 RX ADMIN — IPRATROPIUM BROMIDE AND ALBUTEROL SULFATE 3 ML: .5; 3 SOLUTION RESPIRATORY (INHALATION) at 07:06

## 2017-06-04 RX ADMIN — METHYLPREDNISOLONE SODIUM SUCCINATE 80 MG: 125 INJECTION, POWDER, FOR SOLUTION INTRAMUSCULAR; INTRAVENOUS at 05:06

## 2017-06-04 RX ADMIN — ASPIRIN 81 MG CHEWABLE TABLET 81 MG: 81 TABLET CHEWABLE at 08:06

## 2017-06-04 RX ADMIN — BUDESONIDE 0.5 MG: 0.5 SUSPENSION RESPIRATORY (INHALATION) at 07:06

## 2017-06-04 RX ADMIN — PANTOPRAZOLE SODIUM 40 MG: 40 TABLET, DELAYED RELEASE ORAL at 08:06

## 2017-06-04 RX ADMIN — IPRATROPIUM BROMIDE AND ALBUTEROL SULFATE 3 ML: .5; 3 SOLUTION RESPIRATORY (INHALATION) at 04:06

## 2017-06-04 NOTE — PLAN OF CARE
Problem: Chronic Obstructive Pulmonary Disease (Adult)  Goal: Signs and Symptoms of Listed Potential Problems Will be Absent, Minimized or Managed (Chronic Obstructive Pulmonary Disease)  Signs and symptoms of listed potential problems will be absent, minimized or managed by discharge/transition of care (reference Chronic Obstructive Pulmonary Disease (Adult) CPG).   Outcome: Ongoing (interventions implemented as appropriate)  o2 sat on nc 2l/m=95%; tolerates txs well.

## 2017-06-04 NOTE — PROGRESS NOTES
TONY f/u with patient's daughter, Kimi, to confirm that patient will discharge to home and that she is his .  Daughter verified that at d/c patient will discharge to home.  Daughter wanted to know how long patient would need O2.  SW suggested that she f/u with patient's nurse for additional information.    SW delivered patient's O2 to the room.  Daughter, Kimi, at the bedside.  1400H.    Leidy Rea LMSW, MALINI-Tony, [facility]  06/04/2017

## 2017-06-04 NOTE — NURSING
Patient discharged per MD order. Discharge instructions given to patient. Patient given prescriptions and discharge handout. Patient verbalizes understanding and has no questions at this time. IV discontinued, telemetry removed. Patient transported via wheelchair to vehicle with all personal belongings.

## 2017-06-04 NOTE — PLAN OF CARE
Pt reports improvement in symptoms since admission.  Pt lives alone but has a daughter who lives nearby and a son who both check in on him.  Pt independent with ADLs, does  work for income and expects to continue work.  No needs identified at this time.    D/C plan: home        06/04/17 1034   Discharge Assessment   Assessment Type Discharge Planning Assessment   Confirmed/corrected address and phone number on facesheet? Yes   Assessment information obtained from? Patient;Medical Record   Expected Length of Stay (days) (TBD)   Prior to hospitilization cognitive status: Alert/Oriented   Prior to hospitalization functional status: Independent   Current cognitive status: Alert/Oriented   Current Functional Status: Independent   Arrived From home or self-care   Lives With alone   Able to Return to Prior Arrangements yes   Is patient able to care for self after discharge? Yes   Who are your caregiver(s) and their phone number(s)? Alyssa Wilson, daughter: [phone]   Patient's perception of discharge disposition home or selfcare   Readmission Within The Last 30 Days no previous admission in last 30 days   Patient currently being followed by outpatient case management? No   Patient currently receives home health services? No   Does the patient currently use HME? No   Patient currently receives private duty nursing? No   Patient currently receives any other outside agency services? No   Equipment Currently Used at Home none   Do you have any problems affording any of your prescribed medications? No   Is the patient taking medications as prescribed? yes   Do you have any financial concerns preventing you from receiving the healthcare you need? No   Does the patient have transportation to healthcare appointments? Yes   Transportation Available car;family or friend will provide   On Dialysis? No   Does the patient receive services at the Coumadin Clinic? No   Are there any open cases? No   Discharge Plan A Home    Discharge Plan B Home   Patient/Family In Agreement With Plan yes

## 2017-06-04 NOTE — PLAN OF CARE
Problem: Patient Care Overview  Goal: Plan of Care Review  Outcome: Ongoing (interventions implemented as appropriate)  Pt SR on monitor. No distress noted. Denies pain and SOB. Pt slept most of the night. Remained free of falls. Family at bedside.   Left pt bed low, call light in reach, side rails up. Will continue to monitor.

## 2017-06-04 NOTE — CONSULTS
Consult received for DME. SW met with patient to discuss his discharge disposition.  Patient advised W that he planned to discharge to home and that he would be working on getting post discharge treatment with doctors closer to his home.  TONY verified patient's home address and contact number.  Patient's daughter, Alyssa Wilson, is patient's  since his cell phone gets poor reception near his home.  Patient's daughter's number is [phone].  Patient's cell phone number is [phone].    TONY contacted [facility] on-call technician, Ranjan, to advise of order for O2.  Per Ranjan, TONY to pull portable O2 from the D[employer] Clos[employer], and fax orders to [phone].  SW or patient to contact Ranjan when patient is discharging to that he can arrange to delivery to the home.    Leidy Rea LMSW, MALINI-TONY, [facility]  06/04/2017

## 2017-06-04 NOTE — PROGRESS NOTES
Home Oxygen Evaluation    Date Performed: 2017    1) Patient's Home O2 Sat on room air, while at rest: 89          If O2 sats on room air at rest are 88% or below, patient qualifies. No additional testing needed. Document N/A in steps 2 and 3. If 89% or above, complete steps 2.      2) Patient's O2 Sat on room air while exercisin        If O2 sats on room air while exercising remain 89% or above patient does not qualify, no further testing needed Document N/A in step 3. If O2 sats on room air while exercising are 88% or below, continue to step 3.      3) Patient's O2 Sat while exercising on O2: 95 at 2 LPM         (Must show improvement from #2 for patients to qualify)    If O2 sats improve on oxygen, patient qualifies for portable oxygen. If not, the patient does not qualify.

## 2017-06-04 NOTE — PROGRESS NOTES
Home Oxygen Evaluation    Date Performed: 6/4/2017    1) Patient's Home O2 Sat on room air, while at rest: 89        If O2 sats on room air at rest are 88% or below, patient qualifies. No additional testing needed. Document N/A in steps 2 and 3. If 89% or above, complete steps 2.      2) Patient's O2 Sat on room air while exercising: na        If O2 sats on room air while exercising remain 89% or above patient does not qualify, no further testing needed Document N/A in step 3. If O2 sats on room air while exercising are 88% or below, continue to step 3.      3) Patient's O2 Sat while exercising on O2: na at na LPM         (Must show improvement from #2 for patients to qualify)    If O2 sats improve on oxygen, patient qualifies for portable oxygen. If not, the patient does not qualify.

## 2017-06-04 NOTE — PLAN OF CARE
06/04/17 1439   Final Note   Assessment Type Final Discharge Note   Discharge Disposition Home   Discharge planning education complete? Yes   Did you assess the readiness or willingness of the family or caregiver to support self management of care? Yes   Leidy Rea LMSW, MALINI-TONY, [facility]  06/04/2017

## 2017-06-06 ENCOUNTER — PATIENT OUTREACH (OUTPATIENT)
Dept: ADMINISTRATIVE | Facility: CLINIC | Age: 62
End: 2017-06-06
Payer: MEDICAID

## 2017-06-06 RX ORDER — SIMVASTATIN 20 MG/1
20 TABLET, FILM COATED ORAL NIGHTLY
COMMUNITY
End: 2017-09-30

## 2017-06-06 RX ORDER — IBUPROFEN 200 MG
1 TABLET ORAL
COMMUNITY
End: 2017-09-30

## 2017-06-06 NOTE — PATIENT INSTRUCTIONS
Discharge Instructions: COPD  You have been diagnosed with chronic obstructive pulmonary disease (COPD). This is a name given to a group of diseases that limit the flow of air in and out of your lungs. This makes it harder to breathe. With COPD, you are also more likely to get lung infections. COPD includes chronic bronchitis and emphysema. COPD is most often caused by heavy, long-term cigarette smoking.  Home care  Quit smoking  · If you smoke, quit. It is the best thing you can do for your COPD and your overall health.  · Join a stop-smoking program. There are even telephone, text message, and Internet programs to help you quit.  · Ask your healthcare provider about medicines or other methods to help you quit.  · Ask family members to quit smoking as well.  · Don't allow people to smoke in your home, in your car, or when they are around you.  Protect yourself from infection  · Wash your hands often. Do your best to keep your hands away from your face. Most germs are spread from your hands to your mouth.  · Get a flu shot every year. Also ask your provider about pneumonia vaccines.  · Avoid crowds. It's especially important to do this in the winter when more people have colds and flu.  · To stay healthy, get enough sleep, exercise regularly, and eat a balanced diet. You should:  ¨ Get about 8 hours of sleep every night.  ¨ Try to exercise for at least 30 minutes on most days.  ¨ Have healthy foods including fruits and vegetables, 100% whole grains, lean meats and fish, and low-fat dairy products. Try to stay away from foods high in fats and sugar.  Take your medicines  Take your medicines exactly as directed. Don't skip doses.  Manage your stress  Stress can make COPD worse. Use this stress management technique:  · Find a quiet place and sit or lie in a comfortable position.  · Close your eyes and perform breathing exercises for several minutes. Ask your provider about the best way to breathe.  Pulmonary  rehabilitation  · Pulmonary rehab can help you feel better. These programs include exercise, breathing techniques, information about COPD, counseling, and help for smokers.  · Ask your provider or your local hospital about programs in your area.  When to call your healthcare provider  Call your provider immediately if you have any of the following:  · Shortness of breath, wheezing, or coughing  · Increased mucus  · Yellow, green, bloody, or smelly mucus  · Fever or chills  · Tightness in your chest that does not go away with rest or medicine  · An irregular heartbeat or a feeling that your heart is beating very fast  · Swollen ankles   Date Last Reviewed: 5/1/2016  © 2540-7097 [employer]. [address] [place] [address], [place] [address]. All rights reserved. This information is not intended as a substitute for professional medical care. Always follow your healthcare professional's instructions.

## 2017-07-02 ENCOUNTER — HOSPITAL ENCOUNTER (EMERGENCY)
Facility: HOSPITAL | Age: 62
Discharge: HOME | End: 2017-07-02
Attending: EMERGENCY MEDICINE
Payer: MEDICAID

## 2017-07-02 VITALS
HEART RATE: 93 BPM | TEMPERATURE: 98 F | RESPIRATION RATE: 19 BRPM | WEIGHT: 217 LBS | HEIGHT: 68 IN | BODY MASS INDEX: 32.89 KG/M2 | DIASTOLIC BLOOD PRESSURE: 77 MMHG | SYSTOLIC BLOOD PRESSURE: 128 MMHG | OXYGEN SATURATION: 94 %

## 2017-07-02 DIAGNOSIS — J44.9 CHRONIC OBSTRUCTIVE PULMONARY DISEASE, UNSPECIFIED COPD TYPE: Primary | ICD-10-CM

## 2017-07-02 DIAGNOSIS — R53.83 FATIGUE, UNSPECIFIED TYPE: ICD-10-CM

## 2017-07-02 LAB
ALBUMIN SERPL BCP-MCNC: 3.9 G/DL
ALP SERPL-CCNC: 65 U/L
ALT SERPL W/O P-5'-P-CCNC: 37 U/L
ANION GAP SERPL CALC-SCNC: 12 MMOL/L
AST SERPL-CCNC: 29 U/L
BASOPHILS # BLD AUTO: 0.06 K/UL
BASOPHILS NFR BLD: 0.4 %
BILIRUB SERPL-MCNC: 0.3 MG/DL
BILIRUB UR QL STRIP: NEGATIVE
BNP SERPL-MCNC: 11 PG/ML
BUN SERPL-MCNC: 12 MG/DL
CALCIUM SERPL-MCNC: 9.3 MG/DL
CHLORIDE SERPL-SCNC: 104 MMOL/L
CK SERPL-CCNC: 159 U/L
CLARITY UR REFRACT.AUTO: CLEAR
CO2 SERPL-SCNC: 27 MMOL/L
COLOR UR AUTO: YELLOW
CREAT SERPL-MCNC: 1.2 MG/DL
DIFFERENTIAL METHOD: ABNORMAL
EOSINOPHIL # BLD AUTO: 0.2 K/UL
EOSINOPHIL NFR BLD: 1.5 %
ERYTHROCYTE [DISTWIDTH] IN BLOOD BY AUTOMATED COUNT: 14.1 %
EST. GFR  (AFRICAN AMERICAN): >60 ML/MIN/1.73 M^2
EST. GFR  (NON AFRICAN AMERICAN): >60 ML/MIN/1.73 M^2
GLUCOSE SERPL-MCNC: 98 MG/DL
GLUCOSE UR QL STRIP: NEGATIVE
HCT VFR BLD AUTO: 43.7 %
HGB BLD-MCNC: 14.8 G/DL
HGB UR QL STRIP: NEGATIVE
KETONES UR QL STRIP: NEGATIVE
LEUKOCYTE ESTERASE UR QL STRIP: NEGATIVE
LYMPHOCYTES # BLD AUTO: 3.8 K/UL
LYMPHOCYTES NFR BLD: 27.9 %
MCH RBC QN AUTO: 30.2 PG
MCHC RBC AUTO-ENTMCNC: 33.9 %
MCV RBC AUTO: 89 FL
MONOCYTES # BLD AUTO: 1.2 K/UL
MONOCYTES NFR BLD: 8.8 %
NEUTROPHILS # BLD AUTO: 8.4 K/UL
NEUTROPHILS NFR BLD: 61 %
NITRITE UR QL STRIP: NEGATIVE
PH UR STRIP: 6 [PH] (ref 5–8)
PLATELET # BLD AUTO: 290 K/UL
PMV BLD AUTO: 9.1 FL
POTASSIUM SERPL-SCNC: 3.5 MMOL/L
PROT SERPL-MCNC: 7.2 G/DL
PROT UR QL STRIP: NEGATIVE
RBC # BLD AUTO: 4.9 M/UL
SODIUM SERPL-SCNC: 143 MMOL/L
SP GR UR STRIP: 1.01 (ref 1–1.03)
TROPONIN I SERPL DL<=0.01 NG/ML-MCNC: 0.02 NG/ML
URN SPEC COLLECT METH UR: NORMAL
UROBILINOGEN UR STRIP-ACNC: NEGATIVE EU/DL
WBC # BLD AUTO: 13.71 K/UL

## 2017-07-02 PROCEDURE — 99284 EMERGENCY DEPT VISIT MOD MDM: CPT | Mod: 25

## 2017-07-02 PROCEDURE — 99900035 HC TECH TIME PER 15 MIN (STAT)

## 2017-07-02 PROCEDURE — 84484 ASSAY OF TROPONIN QUANT: CPT

## 2017-07-02 PROCEDURE — 82550 ASSAY OF CK (CPK): CPT

## 2017-07-02 PROCEDURE — 94640 AIRWAY INHALATION TREATMENT: CPT

## 2017-07-02 PROCEDURE — 25000242 PHARM REV CODE 250 ALT 637 W/ HCPCS: Performed by: EMERGENCY MEDICINE

## 2017-07-02 PROCEDURE — 93005 ELECTROCARDIOGRAM TRACING: CPT

## 2017-07-02 PROCEDURE — 80053 COMPREHEN METABOLIC PANEL: CPT

## 2017-07-02 PROCEDURE — 81003 URINALYSIS AUTO W/O SCOPE: CPT

## 2017-07-02 PROCEDURE — 93010 ELECTROCARDIOGRAM REPORT: CPT | Mod: ,,, | Performed by: NUCLEAR MEDICINE

## 2017-07-02 PROCEDURE — 83880 ASSAY OF NATRIURETIC PEPTIDE: CPT

## 2017-07-02 PROCEDURE — 85025 COMPLETE CBC W/AUTO DIFF WBC: CPT

## 2017-07-02 RX ORDER — ASPIRIN 81 MG/1
81 TABLET ORAL DAILY
COMMUNITY
End: 2024-02-03

## 2017-07-02 RX ORDER — IPRATROPIUM BROMIDE AND ALBUTEROL SULFATE 2.5; .5 MG/3ML; MG/3ML
3 SOLUTION RESPIRATORY (INHALATION)
Status: COMPLETED | OUTPATIENT
Start: 2017-07-02 | End: 2017-07-02

## 2017-07-02 RX ORDER — LISINOPRIL AND HYDROCHLOROTHIAZIDE 12.5; 2 MG/1; MG/1
1 TABLET ORAL DAILY
COMMUNITY
End: 2024-02-03

## 2017-07-02 RX ADMIN — IPRATROPIUM BROMIDE AND ALBUTEROL SULFATE 3 ML: .5; 3 SOLUTION RESPIRATORY (INHALATION) at 07:07

## 2017-07-03 NOTE — ED PROVIDER NOTES
Encounter Date: 7/2/2017       History     Chief Complaint   Patient presents with    Fatigue     Generalized fatigue for the past three days.      Patient currently presents with concern regarding generalized fatigue.  He has concerns that he may have overexerted himself in the heat.  He reports 2 prior incidents where he sustained heat injury.  Denies CP or SOB.  No nausea or vomiting appreciated.  NO change in bowel habits noted.  Denies urinary complaints.            Review of patient's allergies indicates:   Allergen Reactions    Amoxicillin Itching    Penicillins     Adhesive Rash     Past Medical History:   Diagnosis Date    Anxiety     COPD (chronic obstructive pulmonary disease)     Diverticular disease     Hypertension      Past Surgical History:   Procedure Laterality Date    COLOSTOMY CLOSURE      HERNIA REPAIR      LAPAROSCOPIC COLOSTOMY       Family History   Problem Relation Age of Onset    COPD Mother     COPD Father      Social History   Substance Use Topics    Smoking status: Current Every Day Smoker     Packs/day: 2.00     Types: Cigarettes    Smokeless tobacco: Never Used    Alcohol use No     Review of Systems   Constitutional: Positive for fatigue. Negative for chills and fever.   HENT: Negative for congestion.    Respiratory: Negative for chest tightness and shortness of breath.    Cardiovascular: Negative for chest pain and leg swelling.   Gastrointestinal: Negative for abdominal pain, constipation, diarrhea, nausea and vomiting.   Genitourinary: Negative for dysuria, frequency and urgency.   Musculoskeletal: Positive for myalgias.   Skin: Negative for color change and rash.   Allergic/Immunologic: Negative for immunocompromised state.   Neurological: Positive for weakness (generalized). Negative for numbness.   Hematological: Negative for adenopathy. Does not bruise/bleed easily.   All other systems reviewed and are negative.      Physical Exam     Initial Vitals [07/02/17  1846]   BP Pulse Resp Temp SpO2   (!) 154/92 106 16 98.6 °F (37 °C) (!) 94 %      MAP       112.67         Physical Exam    Nursing note and vitals reviewed.  Constitutional: He appears well-developed and well-nourished. He is not diaphoretic. No distress.   HENT:   Head: Normocephalic and atraumatic.   Right Ear: External ear normal.   Left Ear: External ear normal.   Nose: Nose normal.   Mouth/Throat: Oropharynx is clear and moist.   Eyes: Conjunctivae and EOM are normal. Pupils are equal, round, and reactive to light. No scleral icterus.   Neck: Neck supple. No JVD present.   Cardiovascular: Normal rate, regular rhythm, normal heart sounds and intact distal pulses. Exam reveals no gallop and no friction rub.    No murmur heard.  Pulmonary/Chest: Breath sounds normal. No respiratory distress. He has no wheezes. He has no rhonchi. He has no rales.   Abdominal: Soft. Bowel sounds are normal. He exhibits no distension and no mass. There is no tenderness.   Musculoskeletal: Normal range of motion. He exhibits no edema.   Neurological: He is alert and oriented to person, place, and time. He has normal strength. No cranial nerve deficit or sensory deficit.   Skin: Skin is warm and dry. No rash noted.   Psychiatric: He has a normal mood and affect. His behavior is normal.         ED Course   Procedures  Labs Reviewed   CBC W/ AUTO DIFFERENTIAL - Abnormal; Notable for the following:        Result Value    WBC 13.71 (*)     MPV 9.1 (*)     Gran # 8.4 (*)     Mono # 1.2 (*)     All other components within normal limits   B-TYPE NATRIURETIC PEPTIDE   COMPREHENSIVE METABOLIC PANEL   TROPONIN I   CK   URINALYSIS     EKG Readings: (Independently Interpreted)   Initial Reading: No STEMI. Rhythm: Normal Sinus Rhythm. Heart Rate: 100. Ectopy: No Ectopy. Conduction: Normal. Axis: Normal.     Imaging Results          X-Ray Chest AP Portable (Final result)  Result time 07/02/17 19:27:25    Final result by Rome Hughes MD  (07/02/17 19:27:25)                 Impression:     No active infiltrates      Electronically signed by: KAVITHA RAMIREZ MD  Date:     07/02/17  Time:    19:27              Narrative:    Portable chest    Clinical indication: Fatigue and wheezing    Findings: The heart and lungs appear normal.  There are no infiltrates.  Lungs are slightly hyperexpanded.                                 Medical Decision Making:   ED Management:  All historical, clinical, radiographic, and laboratory findings were reviewed with the patient in detail.  All remaining questions and concerns were addressed at that time.  Patient has been counseled regarding the need for follow-up as well as the indication to return to the emergency room should new or worrisome developments occur.  Taz Snyder MD                     ED Course     Clinical Impression:   The primary encounter diagnosis was Chronic obstructive pulmonary disease, unspecified COPD type. A diagnosis of Fatigue, unspecified type was also pertinent to this visit.                           Taz Snyder MD  07/02/17 3365

## 2017-07-03 NOTE — ED NOTES
Dr. Snyder is at pt's bedside updating him and family members on test results and poc. Pt has verbalized understanding and denies having any further questions or concerns at this time. Pt will be discharged per md order.

## 2017-07-03 NOTE — ED NOTES
Pt is aaoX4, sinus rhythm on cardiac monitor, 02 saturation remains 91-94% on room air (md is aware, hx of COPD), skin warm and dry, rates pain 0/10, nad. Pt updated on poc, and he has verbalized understanding. Informed pt of the need for urine specimen. He states that he will try to urinate shortly. Bed locked in lowest position, side rails up X2, call bell in reach, family members at the bedside. Will continue to monitor.

## 2017-08-19 ENCOUNTER — HOSPITAL ENCOUNTER (EMERGENCY)
Facility: HOSPITAL | Age: 62
Discharge: HOME | End: 2017-08-19
Payer: MEDICAID

## 2017-08-19 ENCOUNTER — NURSE TRIAGE (OUTPATIENT)
Dept: ADMINISTRATIVE | Facility: CLINIC | Age: 62
End: 2017-08-19

## 2017-08-19 VITALS
DIASTOLIC BLOOD PRESSURE: 79 MMHG | OXYGEN SATURATION: 96 % | TEMPERATURE: 98 F | HEART RATE: 87 BPM | SYSTOLIC BLOOD PRESSURE: 141 MMHG | WEIGHT: 213 LBS | HEIGHT: 68 IN | BODY MASS INDEX: 32.28 KG/M2 | RESPIRATION RATE: 20 BRPM

## 2017-08-19 DIAGNOSIS — T67.5XXA HEAT EXHAUSTION, INITIAL ENCOUNTER: Primary | ICD-10-CM

## 2017-08-19 LAB
ALBUMIN SERPL BCP-MCNC: 4.3 G/DL
ALP SERPL-CCNC: 66 U/L
ALT SERPL W/O P-5'-P-CCNC: 35 U/L
ANION GAP SERPL CALC-SCNC: 11 MMOL/L
ANION GAP SERPL CALC-SCNC: 12 MMOL/L
AST SERPL-CCNC: 35 U/L
BASOPHILS # BLD AUTO: 0.07 K/UL
BASOPHILS NFR BLD: 0.7 %
BILIRUB SERPL-MCNC: 0.6 MG/DL
BILIRUB UR QL STRIP: NEGATIVE
BUN SERPL-MCNC: 26 MG/DL
BUN SERPL-MCNC: 28 MG/DL
CALCIUM SERPL-MCNC: 10.3 MG/DL
CALCIUM SERPL-MCNC: 9.3 MG/DL
CHLORIDE SERPL-SCNC: 104 MMOL/L
CHLORIDE SERPL-SCNC: 99 MMOL/L
CK SERPL-CCNC: 414 U/L
CLARITY UR REFRACT.AUTO: CLEAR
CO2 SERPL-SCNC: 27 MMOL/L
CO2 SERPL-SCNC: 31 MMOL/L
COLOR UR AUTO: YELLOW
CREAT SERPL-MCNC: 1.5 MG/DL
CREAT SERPL-MCNC: 1.8 MG/DL
DIFFERENTIAL METHOD: NORMAL
EOSINOPHIL # BLD AUTO: 0.2 K/UL
EOSINOPHIL NFR BLD: 2.2 %
ERYTHROCYTE [DISTWIDTH] IN BLOOD BY AUTOMATED COUNT: 13.8 %
EST. GFR  (AFRICAN AMERICAN): 45.6 ML/MIN/1.73 M^2
EST. GFR  (AFRICAN AMERICAN): 56.9 ML/MIN/1.73 M^2
EST. GFR  (NON AFRICAN AMERICAN): 39.5 ML/MIN/1.73 M^2
EST. GFR  (NON AFRICAN AMERICAN): 49.2 ML/MIN/1.73 M^2
GLUCOSE SERPL-MCNC: 110 MG/DL
GLUCOSE SERPL-MCNC: 136 MG/DL
GLUCOSE UR QL STRIP: NEGATIVE
HCT VFR BLD AUTO: 43.5 %
HGB BLD-MCNC: 15 G/DL
HGB UR QL STRIP: NEGATIVE
KETONES UR QL STRIP: NEGATIVE
LEUKOCYTE ESTERASE UR QL STRIP: NEGATIVE
LYMPHOCYTES # BLD AUTO: 2.9 K/UL
LYMPHOCYTES NFR BLD: 29 %
MCH RBC QN AUTO: 30.3 PG
MCHC RBC AUTO-ENTMCNC: 34.5 G/DL
MCV RBC AUTO: 88 FL
MONOCYTES # BLD AUTO: 1 K/UL
MONOCYTES NFR BLD: 10 %
NEUTROPHILS # BLD AUTO: 5.8 K/UL
NEUTROPHILS NFR BLD: 58 %
NITRITE UR QL STRIP: NEGATIVE
PH UR STRIP: 5 [PH] (ref 5–8)
PLATELET # BLD AUTO: 294 K/UL
PMV BLD AUTO: 9.6 FL
POTASSIUM SERPL-SCNC: 3.2 MMOL/L
POTASSIUM SERPL-SCNC: 3.6 MMOL/L
PROT SERPL-MCNC: 7.5 G/DL
PROT UR QL STRIP: NEGATIVE
RBC # BLD AUTO: 4.95 M/UL
SODIUM SERPL-SCNC: 142 MMOL/L
SODIUM SERPL-SCNC: 142 MMOL/L
SP GR UR STRIP: 1.02 (ref 1–1.03)
URN SPEC COLLECT METH UR: NORMAL
UROBILINOGEN UR STRIP-ACNC: <2 EU/DL
WBC # BLD AUTO: 9.92 K/UL

## 2017-08-19 PROCEDURE — 80048 BASIC METABOLIC PNL TOTAL CA: CPT

## 2017-08-19 PROCEDURE — 81003 URINALYSIS AUTO W/O SCOPE: CPT

## 2017-08-19 PROCEDURE — 80053 COMPREHEN METABOLIC PANEL: CPT

## 2017-08-19 PROCEDURE — 96360 HYDRATION IV INFUSION INIT: CPT

## 2017-08-19 PROCEDURE — 82550 ASSAY OF CK (CPK): CPT

## 2017-08-19 PROCEDURE — 85025 COMPLETE CBC W/AUTO DIFF WBC: CPT

## 2017-08-19 PROCEDURE — 25000003 PHARM REV CODE 250: Performed by: PHYSICIAN ASSISTANT

## 2017-08-19 PROCEDURE — 99283 EMERGENCY DEPT VISIT LOW MDM: CPT | Mod: 25

## 2017-08-19 PROCEDURE — 96361 HYDRATE IV INFUSION ADD-ON: CPT

## 2017-08-19 RX ORDER — SODIUM CHLORIDE 9 MG/ML
1000 INJECTION, SOLUTION INTRAVENOUS
Status: COMPLETED | OUTPATIENT
Start: 2017-08-19 | End: 2017-08-19

## 2017-08-19 RX ADMIN — SODIUM CHLORIDE 1000 ML: 0.9 INJECTION, SOLUTION INTRAVENOUS at 02:08

## 2017-08-19 RX ADMIN — SODIUM CHLORIDE 1000 ML: 0.9 INJECTION, SOLUTION INTRAVENOUS at 03:08

## 2017-08-19 NOTE — TELEPHONE ENCOUNTER
Reason for Disposition   [1] Dizziness caused by heat exposure, sudden standing, or poor fluid intake AND [2] no improvement after 2 hours of rest and fluids    Protocols used: ST DIZZINESS - [id number]

## 2017-08-19 NOTE — ED PROVIDER NOTES
"Encounter Date: 8/19/2017       History     Chief Complaint   Patient presents with    Spasms     Pt c/o muscle spasms and cramping x 2 days. He states he has been working out in the heat and has not been drinking water as he should     63 yo with h/o heat exhaustion, dehydration, presents with similar symptoms. He works out doing maintenance type work. In the heat all day, started feeling "dehydrated" yesterday with muscle cramps. Denies chest or shortness of breath          Review of patient's allergies indicates:   Allergen Reactions    Amoxicillin Itching    Penicillins     Adhesive Rash     Past Medical History:   Diagnosis Date    Anxiety     COPD (chronic obstructive pulmonary disease)     Diverticular disease     Hypertension      Past Surgical History:   Procedure Laterality Date    COLOSTOMY CLOSURE      HERNIA REPAIR      LAPAROSCOPIC COLOSTOMY       Family History   Problem Relation Age of Onset    COPD Mother     COPD Father      Social History   Substance Use Topics    Smoking status: Current Every Day Smoker     Packs/day: 2.00     Types: Cigarettes    Smokeless tobacco: Never Used    Alcohol use No     Review of Systems   Constitutional: Negative for fever.   HENT: Negative for sore throat.    Respiratory: Negative for shortness of breath.    Cardiovascular: Negative for chest pain.   Gastrointestinal: Negative for nausea.   Genitourinary: Negative for dysuria.   Musculoskeletal: Negative for back pain.   Skin: Negative for rash.   Neurological: Negative for weakness.   Hematological: Does not bruise/bleed easily.       Physical Exam     Initial Vitals [08/19/17 1400]   BP Pulse Resp Temp SpO2   116/73 (!) 115 20 98.4 °F (36.9 °C) (!) 94 %      MAP       87.33         Physical Exam    Nursing note and vitals reviewed.  Constitutional: He appears well-developed and well-nourished. No distress.   HENT:   Head: Normocephalic and atraumatic.   Mouth/Throat: Oropharynx is clear and moist. " Mucous membranes are dry.   Eyes: Conjunctivae and EOM are normal.   Neck: Normal range of motion. Neck supple.   Cardiovascular: Normal rate, regular rhythm and normal heart sounds.   No murmur heard.  Pulmonary/Chest: No respiratory distress. He has wheezes (scattered). He has no rhonchi. He has no rales.   Abdominal: Soft. Bowel sounds are normal. There is no tenderness. There is no rebound and no guarding.   Musculoskeletal: Normal range of motion. He exhibits no edema.   Neurological: He is alert and oriented to person, place, and time. He has normal strength. No sensory deficit.   Skin: Skin is warm and dry.   Psychiatric: He has a normal mood and affect. Thought content normal.         ED Course   Procedures  Labs Reviewed   COMPREHENSIVE METABOLIC PANEL - Abnormal; Notable for the following:        Result Value    Potassium 3.2 (*)     CO2 31 (*)     Glucose 136 (*)     BUN, Bld 28 (*)     Creatinine 1.8 (*)     eGFR if  45.6 (*)     eGFR if non  39.5 (*)     All other components within normal limits   CK - Abnormal; Notable for the following:      (*)     All other components within normal limits   BASIC METABOLIC PANEL - Abnormal; Notable for the following:     BUN, Bld 26 (*)     Creatinine 1.5 (*)     eGFR if  56.9 (*)     eGFR if non  49.2 (*)     All other components within normal limits   CBC W/ AUTO DIFFERENTIAL   URINALYSIS        Repeat exam, mucous membranes are moist, feels much better and urinated well after second liter of normal saline.    pt noted with a 1.8 creatinine , repeat level was 1.5 after 2 liters of fluids.  Informed pt to follow up with pcp next week for repeat labs                       ED Course     Clinical Impression:   The encounter diagnosis was Heat exhaustion, initial encounter.    Disposition:   Disposition: Discharged  Condition: Stable                        THOMAS Green  08/19/17 1700       [place]  THOMAS Hadley  08/19/17 1990

## 2017-09-30 ENCOUNTER — HOSPITAL ENCOUNTER (EMERGENCY)
Facility: HOSPITAL | Age: 62
Discharge: HOME | End: 2017-09-30
Attending: INTERNAL MEDICINE
Payer: MEDICAID

## 2017-09-30 VITALS
TEMPERATURE: 98 F | RESPIRATION RATE: 18 BRPM | HEART RATE: 74 BPM | WEIGHT: 223 LBS | HEIGHT: 67 IN | BODY MASS INDEX: 35 KG/M2 | OXYGEN SATURATION: 97 % | DIASTOLIC BLOOD PRESSURE: 79 MMHG | SYSTOLIC BLOOD PRESSURE: 134 MMHG

## 2017-09-30 DIAGNOSIS — K57.32 DIVERTICULITIS OF LARGE INTESTINE WITHOUT PERFORATION OR ABSCESS WITHOUT BLEEDING: Primary | ICD-10-CM

## 2017-09-30 DIAGNOSIS — R10.9 ABDOMINAL PAIN: ICD-10-CM

## 2017-09-30 DIAGNOSIS — Z86.19: ICD-10-CM

## 2017-09-30 LAB
ANION GAP SERPL CALC-SCNC: 12 MMOL/L
BASOPHILS # BLD AUTO: 0.05 K/UL
BASOPHILS NFR BLD: 0.3 %
BILIRUB UR QL STRIP: NEGATIVE
BUN SERPL-MCNC: 13 MG/DL
CALCIUM SERPL-MCNC: 9.4 MG/DL
CHLORIDE SERPL-SCNC: 102 MMOL/L
CLARITY UR REFRACT.AUTO: CLEAR
CO2 SERPL-SCNC: 27 MMOL/L
COLOR UR AUTO: YELLOW
CREAT SERPL-MCNC: 1.2 MG/DL
DIFFERENTIAL METHOD: ABNORMAL
EOSINOPHIL # BLD AUTO: 0.1 K/UL
EOSINOPHIL NFR BLD: 0.9 %
ERYTHROCYTE [DISTWIDTH] IN BLOOD BY AUTOMATED COUNT: 13.1 %
EST. GFR  (AFRICAN AMERICAN): >60 ML/MIN/1.73 M^2
EST. GFR  (NON AFRICAN AMERICAN): >60 ML/MIN/1.73 M^2
GLUCOSE SERPL-MCNC: 108 MG/DL
GLUCOSE UR QL STRIP: NEGATIVE
HCT VFR BLD AUTO: 44.6 %
HGB BLD-MCNC: 15.2 G/DL
HGB UR QL STRIP: NEGATIVE
KETONES UR QL STRIP: NEGATIVE
LEUKOCYTE ESTERASE UR QL STRIP: NEGATIVE
LYMPHOCYTES # BLD AUTO: 2.6 K/UL
LYMPHOCYTES NFR BLD: 16.8 %
MCH RBC QN AUTO: 30.5 PG
MCHC RBC AUTO-ENTMCNC: 34.1 G/DL
MCV RBC AUTO: 89 FL
MONOCYTES # BLD AUTO: 1.2 K/UL
MONOCYTES NFR BLD: 7.9 %
NEUTROPHILS # BLD AUTO: 11.4 K/UL
NEUTROPHILS NFR BLD: 73.9 %
NITRITE UR QL STRIP: NEGATIVE
PH UR STRIP: 6 [PH] (ref 5–8)
PLATELET # BLD AUTO: 286 K/UL
PMV BLD AUTO: 9.4 FL
POTASSIUM SERPL-SCNC: 3.6 MMOL/L
PROT UR QL STRIP: NEGATIVE
RBC # BLD AUTO: 4.99 M/UL
SODIUM SERPL-SCNC: 141 MMOL/L
SP GR UR STRIP: 1.01 (ref 1–1.03)
URN SPEC COLLECT METH UR: NORMAL
UROBILINOGEN UR STRIP-ACNC: NEGATIVE EU/DL
WBC # BLD AUTO: 15.38 K/UL

## 2017-09-30 PROCEDURE — 81003 URINALYSIS AUTO W/O SCOPE: CPT

## 2017-09-30 PROCEDURE — 80048 BASIC METABOLIC PNL TOTAL CA: CPT

## 2017-09-30 PROCEDURE — 85025 COMPLETE CBC W/AUTO DIFF WBC: CPT

## 2017-09-30 PROCEDURE — 99284 EMERGENCY DEPT VISIT MOD MDM: CPT

## 2017-09-30 RX ORDER — KETOCONAZOLE 20 MG/G
CREAM TOPICAL DAILY
Qty: 30 G | Refills: 0 | Status: SHIPPED | OUTPATIENT
Start: 2017-09-30 | End: 2018-01-21

## 2017-09-30 RX ORDER — CIPROFLOXACIN 500 MG/1
500 TABLET ORAL 2 TIMES DAILY
Qty: 14 TABLET | Refills: 0 | Status: SHIPPED | OUTPATIENT
Start: 2017-09-30 | End: 2017-10-07

## 2017-09-30 RX ORDER — METRONIDAZOLE 500 MG/1
500 TABLET ORAL 3 TIMES DAILY
Qty: 21 TABLET | Refills: 0 | Status: SHIPPED | OUTPATIENT
Start: 2017-09-30 | End: 2017-10-07

## 2017-09-30 RX ORDER — TRAMADOL HYDROCHLORIDE 50 MG/1
50 TABLET ORAL EVERY 6 HOURS PRN
Qty: 12 TABLET | Refills: 0 | Status: SHIPPED | OUTPATIENT
Start: 2017-09-30 | End: 2017-10-10

## 2017-09-30 NOTE — ED PROVIDER NOTES
Encounter Date: 9/30/2017       History   Pt presents with LLQ abdominal pain since last night. Pain is achy, mild, radiating across his pelvic area to the suprapubic region. No fever, no chills, no bowel movement difficulties or bloody stools, no urination difficulties, no diarrhea or vomiting. States Hx of diverticulitis and a repaired Lt inguinal hernia.  Chief Complaint   Patient presents with    Abdominal Pain     Pt c/o lower abd pain since last night     The history is provided by the patient.     Review of patient's allergies indicates:   Allergen Reactions    Amoxicillin Itching    Penicillins     Adhesive Rash     Past Medical History:   Diagnosis Date    Anxiety     COPD (chronic obstructive pulmonary disease)     Diverticular disease     Hypertension      Past Surgical History:   Procedure Laterality Date    COLOSTOMY CLOSURE      HERNIA REPAIR      LAPAROSCOPIC COLOSTOMY       Family History   Problem Relation Age of Onset    COPD Mother     COPD Father      Social History   Substance Use Topics    Smoking status: Current Every Day Smoker     Packs/day: 2.00     Types: Cigarettes    Smokeless tobacco: Never Used    Alcohol use No     Review of Systems   Constitutional: Negative for chills and fever.   HENT: Negative for dental problem and sore throat.    Eyes: Negative for visual disturbance.   Respiratory: Negative for cough and shortness of breath.    Cardiovascular: Negative for chest pain.   Gastrointestinal: Positive for abdominal pain. Negative for blood in stool, diarrhea and vomiting.   Genitourinary: Negative for difficulty urinating, discharge, dysuria and testicular pain.   Musculoskeletal: Negative for back pain and myalgias.   Skin: Positive for rash (Itching and erythema over posterior aspect neck).   Neurological: Negative for weakness and headaches.   Psychiatric/Behavioral: Negative for sleep disturbance.   All other systems reviewed and are negative.      Physical Exam      Initial Vitals [09/30/17 1205]   BP Pulse Resp Temp SpO2   131/88 85 18 97.7 °F (36.5 °C) (!) 94 %      MAP       102.33         Physical Exam    Nursing note and vitals reviewed.  Constitutional: He appears well-developed and well-nourished. No distress.   HENT:   Head: Normocephalic and atraumatic.   Eyes: Conjunctivae and EOM are normal. Pupils are equal, round, and reactive to light.   Neck: Normal range of motion. Neck supple.   Cardiovascular: Regular rhythm, normal heart sounds and intact distal pulses.   Pulmonary/Chest: Breath sounds normal. No respiratory distress.   Abdominal: Soft. Bowel sounds are normal. He exhibits no distension and no mass. There is no tenderness. There is no rebound and no guarding.   Genitourinary: Penis normal.   Genitourinary Comments: No hernia mass palpable   Musculoskeletal: Normal range of motion. He exhibits no edema.   Neurological: He is alert and oriented to person, place, and time. He has normal strength.   Skin: Skin is warm and dry. Capillary refill takes less than 2 seconds. No rash noted.   Psychiatric: His behavior is normal.         ED Course   Procedures  Labs Reviewed   CBC W/ AUTO DIFFERENTIAL - Abnormal; Notable for the following:        Result Value    WBC 15.38 (*)     Gran # 11.4 (*)     Mono # 1.2 (*)     Gran% 73.9 (*)     Lymph% 16.8 (*)     All other components within normal limits   BASIC METABOLIC PANEL   URINALYSIS        Results for orders placed or performed during the hospital encounter of 09/30/17   CBC auto differential   Result Value Ref Range    WBC 15.38 (H) 3.90 - 12.70 K/uL    RBC 4.99 4.60 - 6.20 M/uL    Hemoglobin 15.2 14.0 - 18.0 g/dL    Hematocrit 44.6 40.0 - 54.0 %    MCV 89 82 - 98 fL    MCH 30.5 27.0 - 31.0 pg    MCHC 34.1 32.0 - 36.0 g/dL    RDW 13.1 11.5 - 14.5 %    Platelets 286 150 - 350 K/uL    MPV 9.4 9.2 - 12.9 fL    Gran # 11.4 (H) 1.8 - 7.7 K/uL    Lymph # 2.6 1.0 - 4.8 K/uL    Mono # 1.2 (H) 0.3 - 1.0 K/uL    Eos # 0.1  0.0 - 0.5 K/uL    Baso # 0.05 0.00 - 0.20 K/uL    Gran% 73.9 (H) 38.0 - 73.0 %    Lymph% 16.8 (L) 18.0 - 48.0 %    Mono% 7.9 4.0 - 15.0 %    Eosinophil% 0.9 0.0 - 8.0 %    Basophil% 0.3 0.0 - 1.9 %    Differential Method Automated    Basic metabolic panel   Result Value Ref Range    Sodium 141 136 - 145 mmol/L    Potassium 3.6 3.5 - 5.1 mmol/L    Chloride 102 95 - 110 mmol/L    CO2 27 23 - 29 mmol/L    Glucose 108 70 - 110 mg/dL    BUN, Bld 13 8 - 23 mg/dL    Creatinine 1.2 0.5 - 1.4 mg/dL    Calcium 9.4 8.7 - 10.5 mg/dL    Anion Gap 12 8 - 16 mmol/L    eGFR if African American >60.0 >60 mL/min/1.73 m^2    eGFR if non African American >60.0 >60 mL/min/1.73 m^2   Urinalysis Clean Catch   Result Value Ref Range    Specimen UA Urine, Clean Catch     Color, UA Yellow Yellow, Straw, Abbey    Appearance, UA Clear Clear    pH, UA 6.0 5.0 - 8.0    Specific Gravity, UA 1.010 1.005 - 1.030    Protein, UA Negative Negative    Glucose, UA Negative Negative    Ketones, UA Negative Negative    Bilirubin (UA) Negative Negative    Occult Blood UA Negative Negative    Nitrite, UA Negative Negative    Urobilinogen, UA Negative <2.0 EU/dL    Leukocytes, UA Negative Negative     Imaging Results          CT Abdomen Pelvis  Without Contrast (Final result)  Result time 09/30/17 13:40:03    Final result by Angel James MD (09/30/17 13:40:03)                 Impression:     Left colonic wall thickening with pericolonic edema suggesting a nonspecific colitis.  Nonobstructing stones right kidney.    All CT scans at this facility use dose modulation, iterative reconstruction, and/or weight based dosing when appropriate to reduce radiation dose to as low as reasonably achievable.      Electronically signed by: ANGEL JAMES MD  Date:     09/30/17  Time:    13:40              Narrative:    Exam: CT scan of the abdomen and pelvis without contrast    History:    Left lower quadrant abdominal pain    Findings:     The lung bases are clear.    The  liver and spleen are normal.    The gallbladder is normal. The pancreas is unremarkable.    No adrenal masses are identified.    Tiny nonobstructing stones right kidney.  No obstructing stones or hydronephrosis.    The vascular structures are unremarkable.    Urinary bladder appears normal.    There is a short segment of thickwalled colon with some adjacent soft tissue stranding in the left mid abdomen likely representing a nonspecific colitis.  No bowel dilatation is identified.    No significant osseous findings.                             X-Ray Abdomen Flat And Erect (Final result)  Result time 09/30/17 13:05:00    Final result by Angel James MD (09/30/17 13:05:00)                 Impression:         No acute findings.  Questionable constipation.      Electronically signed by: ANGEL JAMES MD  Date:     09/30/17  Time:    13:05              Narrative:    Exam: Flat and erect abdomen x-ray 2 views    History:     Generalized abdominal pain    Findings:     The bowel gas pattern appears normal. No significant soft tissue or bony abnormality is identified.  Surgical clips in the pelvis.  Arthritic changes lumbar spine.                                                   ED Course      Clinical Impression:   The primary encounter diagnosis was Diverticulitis of large intestine without perforation or abscess without bleeding. Diagnoses of Abdominal pain and Hx of tinea capitis were also pertinent to this visit.    Disposition:   Disposition: Discharged  Condition: Stable                        Fabio Barton MD  09/30/17 9241

## 2018-01-21 ENCOUNTER — HOSPITAL ENCOUNTER (EMERGENCY)
Facility: HOSPITAL | Age: 63
Discharge: HOME | End: 2018-01-21
Attending: EMERGENCY MEDICINE
Payer: MEDICAID

## 2018-01-21 VITALS
OXYGEN SATURATION: 94 % | RESPIRATION RATE: 18 BRPM | HEART RATE: 104 BPM | WEIGHT: 209 LBS | SYSTOLIC BLOOD PRESSURE: 160 MMHG | BODY MASS INDEX: 32.73 KG/M2 | TEMPERATURE: 99 F | DIASTOLIC BLOOD PRESSURE: 80 MMHG

## 2018-01-21 DIAGNOSIS — T14.8XXA MUSCLE STRAIN: Primary | ICD-10-CM
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
## 2018-01-21 PROCEDURE — 99283 EMERGENCY DEPT VISIT LOW MDM: CPT

## 2018-01-21 RX ORDER — CYCLOBENZAPRINE HCL 10 MG
10 TABLET ORAL 3 TIMES DAILY PRN
Qty: 9 TABLET | Refills: 0 | Status: SHIPPED | OUTPATIENT
Start: 2018-01-21 | End: 2018-01-26

## 2018-01-21 RX ORDER — NAPROXEN 500 MG/1
500 TABLET ORAL 2 TIMES DAILY WITH MEALS
Qty: 20 TABLET | Refills: 0 | Status: SHIPPED | OUTPATIENT
Start: 2018-01-21 | End: 2024-02-03

## 2018-01-21 RX ORDER — TRAMADOL HYDROCHLORIDE 50 MG/1
50 TABLET ORAL EVERY 6 HOURS PRN
Qty: 12 TABLET | Refills: 0 | OUTPATIENT
Start: 2018-01-21 | End: 2021-12-19
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
## 2018-01-27 ENCOUNTER — HOSPITAL ENCOUNTER (EMERGENCY)
Facility: HOSPITAL | Age: 63
Discharge: HOME | End: 2018-01-27
Attending: EMERGENCY MEDICINE
Payer: MEDICAID

## 2018-01-27 VITALS
DIASTOLIC BLOOD PRESSURE: 89 MMHG | HEART RATE: 74 BPM | HEIGHT: 68 IN | WEIGHT: 204 LBS | SYSTOLIC BLOOD PRESSURE: 136 MMHG | TEMPERATURE: 98 F | OXYGEN SATURATION: 95 % | BODY MASS INDEX: 30.92 KG/M2 | RESPIRATION RATE: 20 BRPM

## 2018-01-27 DIAGNOSIS — Z72.0 TOBACCO ABUSE: ICD-10-CM

## 2018-01-27 DIAGNOSIS — R53.1 WEAKNESS: ICD-10-CM

## 2018-01-27 DIAGNOSIS — I10 CHRONIC HYPERTENSION: Primary | ICD-10-CM

## 2018-01-27 LAB
ALBUMIN SERPL BCP-MCNC: 4.3 G/DL
ALP SERPL-CCNC: 75 U/L
ALT SERPL W/O P-5'-P-CCNC: 27 U/L
AMPHET+METHAMPHET UR QL: NEGATIVE
ANION GAP SERPL CALC-SCNC: 11 MMOL/L
AST SERPL-CCNC: 30 U/L
BARBITURATES UR QL SCN>200 NG/ML: NEGATIVE
BASOPHILS # BLD AUTO: 0.03 K/UL
BASOPHILS NFR BLD: 0.4 %
BENZODIAZ UR QL SCN>200 NG/ML: NORMAL
BILIRUB SERPL-MCNC: 0.6 MG/DL
BILIRUB UR QL STRIP: NEGATIVE
BUN SERPL-MCNC: 13 MG/DL
BZE UR QL SCN: NEGATIVE
CALCIUM SERPL-MCNC: 9.9 MG/DL
CANNABINOIDS UR QL SCN: NEGATIVE
CHLORIDE SERPL-SCNC: 104 MMOL/L
CK MB SERPL-MCNC: 1.5 NG/ML
CK MB SERPL-RTO: 0.6 %
CK SERPL-CCNC: 251 U/L
CK SERPL-CCNC: 251 U/L
CLARITY UR REFRACT.AUTO: CLEAR
CO2 SERPL-SCNC: 27 MMOL/L
COLOR UR AUTO: YELLOW
CREAT SERPL-MCNC: 1.3 MG/DL
CREAT UR-MCNC: 34.7 MG/DL
DIFFERENTIAL METHOD: NORMAL
EOSINOPHIL # BLD AUTO: 0.1 K/UL
EOSINOPHIL NFR BLD: 1.3 %
ERYTHROCYTE [DISTWIDTH] IN BLOOD BY AUTOMATED COUNT: 13 %
EST. GFR  (AFRICAN AMERICAN): >60 ML/MIN/1.73 M^2
EST. GFR  (NON AFRICAN AMERICAN): 58.1 ML/MIN/1.73 M^2
ETHANOL SERPL-MCNC: <10 MG/DL
GLUCOSE SERPL-MCNC: 91 MG/DL
GLUCOSE UR QL STRIP: NEGATIVE
HCT VFR BLD AUTO: 46.7 %
HGB BLD-MCNC: 16 G/DL
HGB UR QL STRIP: NEGATIVE
KETONES UR QL STRIP: NEGATIVE
LEUKOCYTE ESTERASE UR QL STRIP: NEGATIVE
LYMPHOCYTES # BLD AUTO: 2.5 K/UL
LYMPHOCYTES NFR BLD: 37 %
MCH RBC QN AUTO: 29.4 PG
MCHC RBC AUTO-ENTMCNC: 34.3 G/DL
MCV RBC AUTO: 86 FL
METHADONE UR QL SCN>300 NG/ML: NEGATIVE
MONOCYTES # BLD AUTO: 0.6 K/UL
MONOCYTES NFR BLD: 8.5 %
NEUTROPHILS # BLD AUTO: 3.6 K/UL
NEUTROPHILS NFR BLD: 52.7 %
NITRITE UR QL STRIP: NEGATIVE
OPIATES UR QL SCN: NEGATIVE
PCP UR QL SCN>25 NG/ML: NEGATIVE
PH UR STRIP: 6 [PH] (ref 5–8)
PLATELET # BLD AUTO: 238 K/UL
PMV BLD AUTO: 9.8 FL
POTASSIUM SERPL-SCNC: 4 MMOL/L
PROT SERPL-MCNC: 7.7 G/DL
PROT UR QL STRIP: NEGATIVE
RBC # BLD AUTO: 5.45 M/UL
SODIUM SERPL-SCNC: 142 MMOL/L
SP GR UR STRIP: 1.01 (ref 1–1.03)
TOXICOLOGY INFORMATION: NORMAL
TROPONIN I SERPL DL<=0.01 NG/ML-MCNC: 0.01 NG/ML
URN SPEC COLLECT METH UR: NORMAL
UROBILINOGEN UR STRIP-ACNC: NEGATIVE EU/DL
WBC # BLD AUTO: 6.79 K/UL

## 2018-01-27 PROCEDURE — 93010 ELECTROCARDIOGRAM REPORT: CPT | Mod: ,,, | Performed by: INTERNAL MEDICINE

## 2018-01-27 PROCEDURE — 25000003 PHARM REV CODE 250: Performed by: EMERGENCY MEDICINE

## 2018-01-27 PROCEDURE — 81003 URINALYSIS AUTO W/O SCOPE: CPT | Mod: 59

## 2018-01-27 PROCEDURE — 85025 COMPLETE CBC W/AUTO DIFF WBC: CPT

## 2018-01-27 PROCEDURE — 82553 CREATINE MB FRACTION: CPT

## 2018-01-27 PROCEDURE — 93005 ELECTROCARDIOGRAM TRACING: CPT

## 2018-01-27 PROCEDURE — 84484 ASSAY OF TROPONIN QUANT: CPT

## 2018-01-27 PROCEDURE — 96360 HYDRATION IV INFUSION INIT: CPT

## 2018-01-27 PROCEDURE — 99900035 HC TECH TIME PER 15 MIN (STAT)

## 2018-01-27 PROCEDURE — 80320 DRUG SCREEN QUANTALCOHOLS: CPT

## 2018-01-27 PROCEDURE — 99284 EMERGENCY DEPT VISIT MOD MDM: CPT | Mod: 25

## 2018-01-27 PROCEDURE — 80053 COMPREHEN METABOLIC PANEL: CPT

## 2018-01-27 PROCEDURE — 80307 DRUG TEST PRSMV CHEM ANLYZR: CPT

## 2018-01-27 RX ORDER — SODIUM CHLORIDE 9 MG/ML
1000 INJECTION, SOLUTION INTRAVENOUS ONCE
Status: COMPLETED | OUTPATIENT
Start: 2018-01-27 | End: 2018-01-27

## 2018-01-27 RX ADMIN — SODIUM CHLORIDE 1000 ML: 0.9 INJECTION, SOLUTION INTRAVENOUS at 06:01
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
## 2020-01-21 ENCOUNTER — HOSPITAL ENCOUNTER (EMERGENCY)
Facility: HOSPITAL | Age: 65
Discharge: HOME | End: 2020-01-21
Attending: EMERGENCY MEDICINE
Payer: MEDICARE

## 2020-01-21 VITALS
SYSTOLIC BLOOD PRESSURE: 170 MMHG | WEIGHT: 224 LBS | TEMPERATURE: 99 F | BODY MASS INDEX: 33.18 KG/M2 | HEART RATE: 87 BPM | DIASTOLIC BLOOD PRESSURE: 95 MMHG | RESPIRATION RATE: 20 BRPM | OXYGEN SATURATION: 96 % | HEIGHT: 69 IN

## 2020-01-21 DIAGNOSIS — R55 NEAR SYNCOPE: Primary | ICD-10-CM

## 2020-01-21 LAB
ALBUMIN SERPL BCP-MCNC: 3.9 G/DL (ref 3.5–5.2)
ALP SERPL-CCNC: 70 U/L (ref 55–135)
ALT SERPL W/O P-5'-P-CCNC: 38 U/L (ref 10–44)
ANION GAP SERPL CALC-SCNC: 14 MMOL/L (ref 8–16)
AST SERPL-CCNC: 26 U/L (ref 10–40)
BASOPHILS # BLD AUTO: 0.07 K/UL (ref 0–0.2)
BASOPHILS NFR BLD: 0.7 % (ref 0–1.9)
BILIRUB SERPL-MCNC: 0.4 MG/DL (ref 0.1–1)
BILIRUB UR QL STRIP: NEGATIVE
BNP SERPL-MCNC: <10 PG/ML (ref 0–99)
BUN SERPL-MCNC: 16 MG/DL (ref 8–23)
CALCIUM SERPL-MCNC: 9.2 MG/DL (ref 8.7–10.5)
CHLORIDE SERPL-SCNC: 108 MMOL/L (ref 95–110)
CLARITY UR REFRACT.AUTO: CLEAR
CO2 SERPL-SCNC: 21 MMOL/L (ref 23–29)
COLOR UR AUTO: YELLOW
CREAT SERPL-MCNC: 1.3 MG/DL (ref 0.5–1.4)
DIFFERENTIAL METHOD: NORMAL
EOSINOPHIL # BLD AUTO: 0.2 K/UL (ref 0–0.5)
EOSINOPHIL NFR BLD: 2.4 % (ref 0–8)
ERYTHROCYTE [DISTWIDTH] IN BLOOD BY AUTOMATED COUNT: 12.8 % (ref 11.5–14.5)
EST. GFR  (AFRICAN AMERICAN): >60 ML/MIN/1.73 M^2
EST. GFR  (NON AFRICAN AMERICAN): 57.3 ML/MIN/1.73 M^2
GLUCOSE SERPL-MCNC: 123 MG/DL (ref 70–110)
GLUCOSE UR QL STRIP: NEGATIVE
HCT VFR BLD AUTO: 50.2 % (ref 40–54)
HGB BLD-MCNC: 16.5 G/DL (ref 14–18)
HGB UR QL STRIP: NEGATIVE
IMM GRANULOCYTES # BLD AUTO: 0.02 K/UL (ref 0–0.04)
IMM GRANULOCYTES NFR BLD AUTO: 0.2 % (ref 0–0.5)
KETONES UR QL STRIP: NEGATIVE
LEUKOCYTE ESTERASE UR QL STRIP: NEGATIVE
LYMPHOCYTES # BLD AUTO: 2.7 K/UL (ref 1–4.8)
LYMPHOCYTES NFR BLD: 27.4 % (ref 18–48)
MCH RBC QN AUTO: 29.7 PG (ref 27–31)
MCHC RBC AUTO-ENTMCNC: 32.9 G/DL (ref 32–36)
MCV RBC AUTO: 90 FL (ref 82–98)
MONOCYTES # BLD AUTO: 0.7 K/UL (ref 0.3–1)
MONOCYTES NFR BLD: 7.4 % (ref 4–15)
NEUTROPHILS # BLD AUTO: 6 K/UL (ref 1.8–7.7)
NEUTROPHILS NFR BLD: 61.9 % (ref 38–73)
NITRITE UR QL STRIP: NEGATIVE
NRBC BLD-RTO: 0 /100 WBC
PH UR STRIP: 6 [PH] (ref 5–8)
PLATELET # BLD AUTO: 277 K/UL (ref 150–350)
PMV BLD AUTO: 9.4 FL (ref 9.2–12.9)
POTASSIUM SERPL-SCNC: 4.1 MMOL/L (ref 3.5–5.1)
PROT SERPL-MCNC: 6.8 G/DL (ref 6–8.4)
PROT UR QL STRIP: NEGATIVE
RBC # BLD AUTO: 5.56 M/UL (ref 4.6–6.2)
SODIUM SERPL-SCNC: 143 MMOL/L (ref 136–145)
SP GR UR STRIP: 1.02 (ref 1–1.03)
TROPONIN I SERPL DL<=0.01 NG/ML-MCNC: <0.006 NG/ML (ref 0–0.03)
URN SPEC COLLECT METH UR: NORMAL
UROBILINOGEN UR STRIP-ACNC: NEGATIVE EU/DL
WBC # BLD AUTO: 9.67 K/UL (ref 3.9–12.7)

## 2020-01-21 PROCEDURE — 96360 HYDRATION IV INFUSION INIT: CPT | Mod: ER

## 2020-01-21 PROCEDURE — 83880 ASSAY OF NATRIURETIC PEPTIDE: CPT | Mod: ER

## 2020-01-21 PROCEDURE — 81003 URINALYSIS AUTO W/O SCOPE: CPT | Mod: ER

## 2020-01-21 PROCEDURE — 93010 ELECTROCARDIOGRAM REPORT: CPT | Mod: ,,, | Performed by: INTERNAL MEDICINE

## 2020-01-21 PROCEDURE — 84484 ASSAY OF TROPONIN QUANT: CPT | Mod: ER

## 2020-01-21 PROCEDURE — 93005 ELECTROCARDIOGRAM TRACING: CPT | Mod: ER

## 2020-01-21 PROCEDURE — 99285 EMERGENCY DEPT VISIT HI MDM: CPT | Mod: 25,ER

## 2020-01-21 PROCEDURE — 93010 EKG 12-LEAD: ICD-10-PCS | Mod: ,,, | Performed by: INTERNAL MEDICINE

## 2020-01-21 PROCEDURE — 80053 COMPREHEN METABOLIC PANEL: CPT | Mod: ER

## 2020-01-21 PROCEDURE — 63600175 PHARM REV CODE 636 W HCPCS: Mod: ER | Performed by: EMERGENCY MEDICINE

## 2020-01-21 PROCEDURE — 85025 COMPLETE CBC W/AUTO DIFF WBC: CPT | Mod: ER

## 2020-01-21 RX ADMIN — SODIUM CHLORIDE 1000 ML: 0.9 INJECTION, SOLUTION INTRAVENOUS at 08:01

## 2020-01-21 NOTE — ED PROVIDER NOTES
Encounter Date: 1/21/2020       History     Chief Complaint   Patient presents with    Dizziness     The history is provided by the patient.   Dizziness   This is a new problem. The current episode started 1 to 2 hours ago. The problem occurs constantly. The problem has been resolved. Pertinent negatives include no chest pain, no abdominal pain, no headaches and no shortness of breath. The symptoms are aggravated by standing. The symptoms are relieved by rest. He has tried rest for the symptoms. The treatment provided significant relief.     Review of patient's allergies indicates:   Allergen Reactions    Amoxicillin Itching    Penicillins     Adhesive Rash     Past Medical History:   Diagnosis Date    Anxiety     COPD (chronic obstructive pulmonary disease)     Diverticular disease     Hypertension      Past Surgical History:   Procedure Laterality Date    COLOSTOMY CLOSURE      HERNIA REPAIR      LAPAROSCOPIC COLOSTOMY       Family History   Problem Relation Age of Onset    COPD Mother     COPD Father      Social History     Tobacco Use    Smoking status: Current Every Day Smoker     Packs/day: 2.00     Types: Cigarettes    Smokeless tobacco: Never Used   Substance Use Topics    Alcohol use: No    Drug use: No     Review of Systems   Respiratory: Negative for shortness of breath.    Cardiovascular: Negative for chest pain.   Gastrointestinal: Negative for abdominal pain.   Neurological: Positive for dizziness and light-headedness. Negative for headaches.   All other systems reviewed and are negative.      Physical Exam     Initial Vitals [01/21/20 0756]   BP Pulse Resp Temp SpO2   (!) 188/92 87 (!) 24 98.6 °F (37 °C) (!) 94 %      MAP       --         Physical Exam    Nursing note and vitals reviewed.  Constitutional: He appears well-developed and well-nourished.   HENT:   Head: Normocephalic and atraumatic.   Mouth/Throat: Oropharynx is clear and moist.   Eyes: Conjunctivae and EOM are normal.  Pupils are equal, round, and reactive to light.   Neck: Normal range of motion. Neck supple.   Cardiovascular: Normal rate, regular rhythm and normal heart sounds.   Pulmonary/Chest: Breath sounds normal.   Abdominal: Soft. Bowel sounds are normal.   Musculoskeletal: Normal range of motion.   Neurological: He is alert and oriented to person, place, and time. He has normal strength.   Skin: Skin is warm and dry.   Psychiatric: He has a normal mood and affect. Thought content normal.         ED Course   Procedures  Labs Reviewed   COMPREHENSIVE METABOLIC PANEL - Abnormal; Notable for the following components:       Result Value    CO2 21 (*)     Glucose 123 (*)     eGFR if non  57.3 (*)     All other components within normal limits   CBC W/ AUTO DIFFERENTIAL   B-TYPE NATRIURETIC PEPTIDE   URINALYSIS, REFLEX TO URINE CULTURE    Narrative:     Preferred Collection Type->Urine, Clean Catch   TROPONIN I     Results for orders placed or performed during the hospital encounter of 01/21/20   CBC auto differential   Result Value Ref Range    WBC 9.67 3.90 - 12.70 K/uL    RBC 5.56 4.60 - 6.20 M/uL    Hemoglobin 16.5 14.0 - 18.0 g/dL    Hematocrit 50.2 40.0 - 54.0 %    Mean Corpuscular Volume 90 82 - 98 fL    Mean Corpuscular Hemoglobin 29.7 27.0 - 31.0 pg    Mean Corpuscular Hemoglobin Conc 32.9 32.0 - 36.0 g/dL    RDW 12.8 11.5 - 14.5 %    Platelets 277 150 - 350 K/uL    MPV 9.4 9.2 - 12.9 fL    Immature Granulocytes 0.2 0.0 - 0.5 %    Gran # (ANC) 6.0 1.8 - 7.7 K/uL    Immature Grans (Abs) 0.02 0.00 - 0.04 K/uL    Lymph # 2.7 1.0 - 4.8 K/uL    Mono # 0.7 0.3 - 1.0 K/uL    Eos # 0.2 0.0 - 0.5 K/uL    Baso # 0.07 0.00 - 0.20 K/uL    nRBC 0 0 /100 WBC    Gran% 61.9 38.0 - 73.0 %    Lymph% 27.4 18.0 - 48.0 %    Mono% 7.4 4.0 - 15.0 %    Eosinophil% 2.4 0.0 - 8.0 %    Basophil% 0.7 0.0 - 1.9 %    Differential Method Automated    B-Type natriuretic peptide (BNP)   Result Value Ref Range    BNP <10 0 - 99 pg/mL    Urinalysis, Reflex to Urine Culture Urine, Clean Catch   Result Value Ref Range    Specimen UA Urine, Clean Catch     Color, UA Yellow Yellow, Straw, Abbey    Appearance, UA Clear Clear    pH, UA 6.0 5.0 - 8.0    Specific Gravity, UA 1.025 1.005 - 1.030    Protein, UA Negative Negative    Glucose, UA Negative Negative    Ketones, UA Negative Negative    Bilirubin (UA) Negative Negative    Occult Blood UA Negative Negative    Nitrite, UA Negative Negative    Urobilinogen, UA Negative <2.0 EU/dL    Leukocytes, UA Negative Negative   Comprehensive metabolic panel   Result Value Ref Range    Sodium 143 136 - 145 mmol/L    Potassium 4.1 3.5 - 5.1 mmol/L    Chloride 108 95 - 110 mmol/L    CO2 21 (L) 23 - 29 mmol/L    Glucose 123 (H) 70 - 110 mg/dL    BUN, Bld 16 8 - 23 mg/dL    Creatinine 1.3 0.5 - 1.4 mg/dL    Calcium 9.2 8.7 - 10.5 mg/dL    Total Protein 6.8 6.0 - 8.4 g/dL    Albumin 3.9 3.5 - 5.2 g/dL    Total Bilirubin 0.4 0.1 - 1.0 mg/dL    Alkaline Phosphatase 70 55 - 135 U/L    AST 26 10 - 40 U/L    ALT 38 10 - 44 U/L    Anion Gap 14 8 - 16 mmol/L    eGFR if African American >60.0 >60 mL/min/1.73 m^2    eGFR if non  57.3 (A) >60 mL/min/1.73 m^2   Troponin I   Result Value Ref Range    Troponin I <0.006 0.000 - 0.026 ng/mL       EKG Readings: (Independently Interpreted)   Initial Reading: No STEMI. Rhythm: Normal Sinus Rhythm. Heart Rate: 79. ST Segments: Normal ST Segments. T Waves: Normal. Clinical Impression: Normal Sinus Rhythm       Imaging Results          X-Ray Chest AP Portable (Final result)  Result time 01/21/20 08:06:46    Final result by Edmond Smith MD (01/21/20 08:06:46)                 Impression:      No acute findings.      Electronically signed by: Edmond Smith MD  Date:    01/21/2020  Time:    08:06             Narrative:    EXAMINATION:  XR CHEST AP PORTABLE    CLINICAL HISTORY:  near syncope;    TECHNIQUE:  Single frontal view of the chest was  performed.    COMPARISON:  01/27/2018    FINDINGS:  The cardiomediastinal silhouette is normal.    The lungs are clear.    No effusion.                            9:24 AM - Counseling: Spoke with the patient and discussed todays findings, in addition to providing specific details for the plan of care and counseling regarding the diagnosis and prognosis. Questions are answered at this time.                                        Clinical Impression:       ICD-10-CM ICD-9-CM   1. Near syncope R55 780.2         Disposition:   Disposition: Discharged  Condition: Stable                     Mike Srivastava MD  01/21/20 0916

## 2020-01-21 NOTE — ED NOTES
Pt resting in bed. NAD, VSS, RR equal and unlabored. Will continue to monitorPt alert and oriented, calm/cooperative, and in NAD. Family at bedside.

## 2021-12-19 ENCOUNTER — HOSPITAL ENCOUNTER (EMERGENCY)
Facility: HOSPITAL | Age: 66
Discharge: HOME | End: 2021-12-19
Attending: EMERGENCY MEDICINE
Payer: MEDICARE

## 2021-12-19 VITALS
DIASTOLIC BLOOD PRESSURE: 92 MMHG | OXYGEN SATURATION: 95 % | RESPIRATION RATE: 20 BRPM | WEIGHT: 227.06 LBS | SYSTOLIC BLOOD PRESSURE: 163 MMHG | HEART RATE: 86 BPM | BODY MASS INDEX: 33.53 KG/M2 | TEMPERATURE: 98 F

## 2021-12-19 DIAGNOSIS — R74.8 ELEVATED LIVER ENZYMES: ICD-10-CM

## 2021-12-19 DIAGNOSIS — F41.9 ANXIETY: Primary | ICD-10-CM

## 2021-12-19 DIAGNOSIS — Z72.0 TOBACCO ABUSE: ICD-10-CM

## 2021-12-19 DIAGNOSIS — R51.9 HEADACHE: ICD-10-CM

## 2021-12-19 DIAGNOSIS — I10 HYPERTENSION: ICD-10-CM

## 2021-12-19 LAB
ALBUMIN SERPL BCP-MCNC: 4.3 G/DL (ref 3.5–5.2)
ALP SERPL-CCNC: 83 U/L (ref 55–135)
ALT SERPL W/O P-5'-P-CCNC: 64 U/L (ref 10–44)
ANION GAP SERPL CALC-SCNC: 13 MMOL/L (ref 8–16)
AST SERPL-CCNC: 44 U/L (ref 10–40)
BASOPHILS # BLD AUTO: 0.11 K/UL (ref 0–0.2)
BASOPHILS NFR BLD: 1.1 % (ref 0–1.9)
BILIRUB SERPL-MCNC: 0.5 MG/DL (ref 0.1–1)
BUN SERPL-MCNC: 10 MG/DL (ref 8–23)
CALCIUM SERPL-MCNC: 9.4 MG/DL (ref 8.7–10.5)
CHLORIDE SERPL-SCNC: 103 MMOL/L (ref 95–110)
CO2 SERPL-SCNC: 26 MMOL/L (ref 23–29)
CREAT SERPL-MCNC: 1.3 MG/DL (ref 0.5–1.4)
DIFFERENTIAL METHOD: NORMAL
EOSINOPHIL # BLD AUTO: 0.2 K/UL (ref 0–0.5)
EOSINOPHIL NFR BLD: 1.9 % (ref 0–8)
ERYTHROCYTE [DISTWIDTH] IN BLOOD BY AUTOMATED COUNT: 12.7 % (ref 11.5–14.5)
EST. GFR  (AFRICAN AMERICAN): >60 ML/MIN/1.73 M^2
EST. GFR  (NON AFRICAN AMERICAN): 56.9 ML/MIN/1.73 M^2
GLUCOSE SERPL-MCNC: 97 MG/DL (ref 70–110)
HCT VFR BLD AUTO: 49.6 % (ref 40–54)
HGB BLD-MCNC: 16.4 G/DL (ref 14–18)
IMM GRANULOCYTES # BLD AUTO: 0.04 K/UL (ref 0–0.04)
IMM GRANULOCYTES NFR BLD AUTO: 0.4 % (ref 0–0.5)
LYMPHOCYTES # BLD AUTO: 3 K/UL (ref 1–4.8)
LYMPHOCYTES NFR BLD: 29.7 % (ref 18–48)
MCH RBC QN AUTO: 29.7 PG (ref 27–31)
MCHC RBC AUTO-ENTMCNC: 33.1 G/DL (ref 32–36)
MCV RBC AUTO: 90 FL (ref 82–98)
MONOCYTES # BLD AUTO: 0.8 K/UL (ref 0.3–1)
MONOCYTES NFR BLD: 7.6 % (ref 4–15)
NEUTROPHILS # BLD AUTO: 6 K/UL (ref 1.8–7.7)
NEUTROPHILS NFR BLD: 59.3 % (ref 38–73)
NRBC BLD-RTO: 0 /100 WBC
PLATELET # BLD AUTO: 261 K/UL (ref 150–450)
PMV BLD AUTO: 9.4 FL (ref 9.2–12.9)
POTASSIUM SERPL-SCNC: 4.1 MMOL/L (ref 3.5–5.1)
PROT SERPL-MCNC: 7.4 G/DL (ref 6–8.4)
RBC # BLD AUTO: 5.52 M/UL (ref 4.6–6.2)
SODIUM SERPL-SCNC: 142 MMOL/L (ref 136–145)
WBC # BLD AUTO: 10.03 K/UL (ref 3.9–12.7)

## 2021-12-19 PROCEDURE — 85025 COMPLETE CBC W/AUTO DIFF WBC: CPT | Mod: ER | Performed by: EMERGENCY MEDICINE

## 2021-12-19 PROCEDURE — 80053 COMPREHEN METABOLIC PANEL: CPT | Mod: ER | Performed by: EMERGENCY MEDICINE

## 2021-12-19 PROCEDURE — 93005 ELECTROCARDIOGRAM TRACING: CPT | Mod: ER

## 2021-12-19 PROCEDURE — 96374 THER/PROPH/DIAG INJ IV PUSH: CPT | Mod: ER

## 2021-12-19 PROCEDURE — 93010 EKG 12-LEAD: ICD-10-PCS | Mod: ,,, | Performed by: INTERNAL MEDICINE

## 2021-12-19 PROCEDURE — 63600175 PHARM REV CODE 636 W HCPCS: Mod: ER | Performed by: EMERGENCY MEDICINE

## 2021-12-19 PROCEDURE — 93010 ELECTROCARDIOGRAM REPORT: CPT | Mod: ,,, | Performed by: INTERNAL MEDICINE

## 2021-12-19 PROCEDURE — 99285 EMERGENCY DEPT VISIT HI MDM: CPT | Mod: 25,ER

## 2021-12-19 RX ORDER — LORAZEPAM 2 MG/ML
INJECTION INTRAMUSCULAR
Status: DISPENSED
Start: 2021-12-19 | End: 2021-12-20

## 2021-12-19 RX ORDER — PANTOPRAZOLE SODIUM 20 MG/1
20 TABLET, DELAYED RELEASE ORAL EVERY MORNING
COMMUNITY
Start: 2021-12-06

## 2021-12-19 RX ADMIN — LORAZEPAM 0.5 MG: 2 INJECTION INTRAMUSCULAR; INTRAVENOUS at 02:12

## 2022-12-05 NOTE — PROGRESS NOTES
JOSE FRANCISCO contacted [facility] technician, Ranjan RUFFIN, to advise him that patient is preparing to discharge so that he can contact family to arrange delivery of concentrator to the home.  Leidy Rea LMSW, [facility], [facility]  06/04/2017   Clofazimine Pregnancy And Lactation Text: This medication is Pregnancy Category C and isn't considered safe during pregnancy. It is excreted in breast milk.

## 2023-07-03 ENCOUNTER — HOSPITAL ENCOUNTER (EMERGENCY)
Facility: HOSPITAL | Age: 68
Discharge: HOME | End: 2023-07-03
Attending: EMERGENCY MEDICINE
Payer: MEDICARE

## 2023-07-03 VITALS
DIASTOLIC BLOOD PRESSURE: 84 MMHG | BODY MASS INDEX: 31.81 KG/M2 | WEIGHT: 209.88 LBS | HEIGHT: 68 IN | SYSTOLIC BLOOD PRESSURE: 160 MMHG | OXYGEN SATURATION: 95 % | HEART RATE: 72 BPM | RESPIRATION RATE: 20 BRPM | TEMPERATURE: 99 F

## 2023-07-03 DIAGNOSIS — R42 DIZZINESS: ICD-10-CM

## 2023-07-03 DIAGNOSIS — T67.5XXA HEAT EXHAUSTION, INITIAL ENCOUNTER: Primary | ICD-10-CM

## 2023-07-03 LAB
ALBUMIN SERPL BCP-MCNC: 4.3 G/DL (ref 3.5–5.2)
ALP SERPL-CCNC: 64 U/L (ref 55–135)
ALT SERPL W/O P-5'-P-CCNC: 37 U/L (ref 10–44)
ANION GAP SERPL CALC-SCNC: 9 MMOL/L (ref 8–16)
AST SERPL-CCNC: 28 U/L (ref 10–40)
BASOPHILS # BLD AUTO: 0.1 K/UL (ref 0–0.2)
BASOPHILS NFR BLD: 0.8 % (ref 0–1.9)
BILIRUB SERPL-MCNC: 0.5 MG/DL (ref 0.1–1)
BILIRUB UR QL STRIP: NEGATIVE
BUN SERPL-MCNC: 14 MG/DL (ref 8–23)
CALCIUM SERPL-MCNC: 9.7 MG/DL (ref 8.7–10.5)
CHLORIDE SERPL-SCNC: 105 MMOL/L (ref 95–110)
CK SERPL-CCNC: 149 U/L (ref 20–200)
CLARITY UR REFRACT.AUTO: CLEAR
CO2 SERPL-SCNC: 26 MMOL/L (ref 23–29)
COLOR UR AUTO: YELLOW
CREAT SERPL-MCNC: 1.2 MG/DL (ref 0.5–1.4)
DIFFERENTIAL METHOD: ABNORMAL
EOSINOPHIL # BLD AUTO: 0.2 K/UL (ref 0–0.5)
EOSINOPHIL NFR BLD: 1.3 % (ref 0–8)
ERYTHROCYTE [DISTWIDTH] IN BLOOD BY AUTOMATED COUNT: 13.4 % (ref 11.5–14.5)
EST. GFR  (NO RACE VARIABLE): >60 ML/MIN/1.73 M^2
GLUCOSE SERPL-MCNC: 114 MG/DL (ref 70–110)
GLUCOSE UR QL STRIP: NEGATIVE
HCT VFR BLD AUTO: 45.8 % (ref 40–54)
HGB BLD-MCNC: 15.7 G/DL (ref 14–18)
HGB UR QL STRIP: NEGATIVE
IMM GRANULOCYTES # BLD AUTO: 0.06 K/UL (ref 0–0.04)
IMM GRANULOCYTES NFR BLD AUTO: 0.5 % (ref 0–0.5)
KETONES UR QL STRIP: NEGATIVE
LEUKOCYTE ESTERASE UR QL STRIP: NEGATIVE
LYMPHOCYTES # BLD AUTO: 3.4 K/UL (ref 1–4.8)
LYMPHOCYTES NFR BLD: 25.9 % (ref 18–48)
MAGNESIUM SERPL-MCNC: 2.2 MG/DL (ref 1.6–2.6)
MCH RBC QN AUTO: 30.7 PG (ref 27–31)
MCHC RBC AUTO-ENTMCNC: 34.3 G/DL (ref 32–36)
MCV RBC AUTO: 90 FL (ref 82–98)
MONOCYTES # BLD AUTO: 1 K/UL (ref 0.3–1)
MONOCYTES NFR BLD: 7.7 % (ref 4–15)
NEUTROPHILS # BLD AUTO: 8.5 K/UL (ref 1.8–7.7)
NEUTROPHILS NFR BLD: 63.8 % (ref 38–73)
NITRITE UR QL STRIP: NEGATIVE
NRBC BLD-RTO: 0 /100 WBC
PH UR STRIP: 6 [PH] (ref 5–8)
PLATELET # BLD AUTO: 322 K/UL (ref 150–450)
PMV BLD AUTO: 9.5 FL (ref 9.2–12.9)
POTASSIUM SERPL-SCNC: 4.1 MMOL/L (ref 3.5–5.1)
PROT SERPL-MCNC: 7.4 G/DL (ref 6–8.4)
PROT UR QL STRIP: NEGATIVE
RBC # BLD AUTO: 5.12 M/UL (ref 4.6–6.2)
SODIUM SERPL-SCNC: 140 MMOL/L (ref 136–145)
SP GR UR STRIP: <=1.005 (ref 1–1.03)
URN SPEC COLLECT METH UR: ABNORMAL
UROBILINOGEN UR STRIP-ACNC: NEGATIVE EU/DL
WBC # BLD AUTO: 13.27 K/UL (ref 3.9–12.7)

## 2023-07-03 PROCEDURE — 82550 ASSAY OF CK (CPK): CPT | Mod: ER | Performed by: EMERGENCY MEDICINE

## 2023-07-03 PROCEDURE — 85025 COMPLETE CBC W/AUTO DIFF WBC: CPT | Mod: ER | Performed by: EMERGENCY MEDICINE

## 2023-07-03 PROCEDURE — 93005 ELECTROCARDIOGRAM TRACING: CPT | Mod: ER

## 2023-07-03 PROCEDURE — 87389 HIV-1 AG W/HIV-1&-2 AB AG IA: CPT | Performed by: EMERGENCY MEDICINE

## 2023-07-03 PROCEDURE — 83735 ASSAY OF MAGNESIUM: CPT | Mod: ER | Performed by: EMERGENCY MEDICINE

## 2023-07-03 PROCEDURE — 99284 EMERGENCY DEPT VISIT MOD MDM: CPT | Mod: ER,25

## 2023-07-03 PROCEDURE — 93010 EKG 12-LEAD: ICD-10-PCS | Mod: ,,, | Performed by: INTERNAL MEDICINE

## 2023-07-03 PROCEDURE — 80053 COMPREHEN METABOLIC PANEL: CPT | Mod: ER | Performed by: EMERGENCY MEDICINE

## 2023-07-03 PROCEDURE — 96360 HYDRATION IV INFUSION INIT: CPT | Mod: ER

## 2023-07-03 PROCEDURE — 81003 URINALYSIS AUTO W/O SCOPE: CPT | Mod: ER | Performed by: EMERGENCY MEDICINE

## 2023-07-03 PROCEDURE — 86803 HEPATITIS C AB TEST: CPT | Performed by: EMERGENCY MEDICINE

## 2023-07-03 PROCEDURE — 96361 HYDRATE IV INFUSION ADD-ON: CPT | Mod: ER

## 2023-07-03 PROCEDURE — 93010 ELECTROCARDIOGRAM REPORT: CPT | Mod: ,,, | Performed by: INTERNAL MEDICINE

## 2023-07-03 PROCEDURE — 25000003 PHARM REV CODE 250: Mod: ER | Performed by: EMERGENCY MEDICINE

## 2023-07-03 RX ADMIN — SODIUM CHLORIDE 1000 ML: 9 INJECTION, SOLUTION INTRAVENOUS at 12:07

## 2023-07-03 NOTE — ED PROVIDER NOTES
"Emergency Medicine Provider Note - 7/3/2023        History     Chief Complaint   Patient presents with    Dehydration     "I work outside and I'm dehydrated"       Patient states he feels dehydrated, states that he has a history of muscle breakdown when dehydrated.       History of Present Illness   [facility]    7/3/2023, 12:08 PM  The history is provided by the patient    Eber Wilson is a 68 y.o. male presenting to the ED for possible dehydration.  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  It is been extremely hot.  Patient reports that he started feeling lightheaded feeling pin-prick sensations on his head. Patient states he feels dehydrated, states that he has a history of muscle breakdown when dehydrated. It was not associated with any numbness or weakness to 1 side, slurred speech, difficulty talking, difficulty swallowing.  Patient denied any chest pain, chest pressure, shortness of breath, abdominal pain, nausea, vomiting, diarrhea.  Patient reports that he stating yesterday drinking Pedialyte and stayed lais cool environment..  He comes in today complaining of feeling dehydrated.  Again he denies any chest pain, chest pressure, shortness of breath.      Arrival mode:  Personal Vehicle      PCP: JOHN Corley     Allergies:  Review of patient's allergies indicates:   Allergen Reactions    Amoxicillin Itching    Penicillins     Adhesive Rash       Past Medical History:  Past Medical History:   Diagnosis Date    Anxiety     COPD (chronic obstructive pulmonary disease)     Diverticular disease     Hypertension        Past Surgical History:  Past Surgical History:   Procedure Laterality Date    COLOSTOMY CLOSURE      HERNIA REPAIR      LAPAROSCOPIC COLOSTOMY           Family History:  Family History   Problem Relation Age of Onset    COPD Mother     COPD Father        Social History:  Social History     Tobacco Use    Smoking status: Every Day     Packs/day: 2.00     Types: Cigarettes    Smokeless " tobacco: Never   Substance and Sexual Activity    Alcohol use: No    Drug use: No    Sexual activity: Yes     Partners: Female       Triage note, Allergies, Past Medical History, Past Surgical History, Family History and Social History reviewed as documented above.     Review of Systems   Review of Systems   Constitutional:  Positive for fatigue. Negative for fever.   HENT:  Negative for sore throat.    Respiratory:  Negative for chest tightness and shortness of breath.    Cardiovascular:  Negative for leg swelling.   Gastrointestinal:  Negative for abdominal pain, nausea and vomiting.   Genitourinary:  Negative for dysuria.   Musculoskeletal:  Negative for back pain.   Skin:  Negative for rash.   Neurological:  Positive for light-headedness. Negative for dizziness, speech difficulty, weakness and headaches.   Hematological:  Does not bruise/bleed easily.        Physical Exam     Initial Vitals [07/03/23 1202]   BP Pulse Resp Temp SpO2   (!) 179/90 94 20 98.6 °F (37 °C) 95 %      MAP       --          Physical Exam    Nursing Notes and Vital Signs Reviewed.  Constitutional: Patient is in . Well-developed and well-nourished.  Head: Atraumatic. Normocephalic.  Eyes: PERRL. EOM intact. Conjunctivae are not pale. No scleral icterus.  No nystagmus.   ENT: Mucous membranes are moist. Oropharynx is clear and symmetric.    Neck: Supple. Full ROM. No lymphadenopathy.  Cardiovascular: Regular rate. Regular rhythm. No murmurs, rubs, or gallops. Distal pulses are 2+ and symmetric.  Pulmonary/Chest: No respiratory distress. Clear to auscultation bilaterally. No wheezing or rales.  Abdominal: Soft and non-distended.  There is no tenderness.  No rebound, guarding, or rigidity. Good bowel sounds.  Genitourinary: No CVA tenderness  Musculoskeletal: Moves all extremities. No obvious deformities. No edema. No calf tenderness.  Skin: Warm and dry.  Neurological:  Alert, awake, and appropriate.  Normal speech.  No acute focal  "neurological deficits are appreciated.  Cranial nerves 2-12 intact.  GCS 15.  NIH Stroke Scale equals 0  Psychiatric: Normal affect. Good eye contact. Appropriate in content.     ED Course     ED Procedures:  Procedures    ED Vital Signs:  Vitals:    07/03/23 1202 07/03/23 1227 07/03/23 1347   BP: (!) 179/90  (!) 160/84   Pulse: 94  72   Resp: 20  20   Temp: 98.6 °F (37 °C)     TempSrc: Oral     SpO2: 95%  95%   Weight: 95.2 kg (209 lb 14.1 oz)     Height:  5' 8" (1.727 m)        Abnormal Lab Results:  Labs Reviewed   CBC W/ AUTO DIFFERENTIAL - Abnormal; Notable for the following components:       Result Value    WBC 13.27 (*)     Gran # (ANC) 8.5 (*)     Immature Grans (Abs) 0.06 (*)     All other components within normal limits   COMPREHENSIVE METABOLIC PANEL - Abnormal; Notable for the following components:    Glucose 114 (*)     All other components within normal limits   URINALYSIS, REFLEX TO URINE CULTURE - Abnormal; Notable for the following components:    Specific Gravity, UA <=1.005 (*)     All other components within normal limits    Narrative:     Specimen Source->Urine   MAGNESIUM   CK   HIV 1 / 2 ANTIBODY   HEPATITIS C ANTIBODY   HEP C VIRUS HOLD SPECIMEN        All Lab Results:  Results for orders placed or performed during the hospital encounter of 07/03/23   CBC Auto Differential   Result Value Ref Range    WBC 13.27 (H) 3.90 - 12.70 K/uL    RBC 5.12 4.60 - 6.20 M/uL    Hemoglobin 15.7 14.0 - 18.0 g/dL    Hematocrit 45.8 40.0 - 54.0 %    MCV 90 82 - 98 fL    MCH 30.7 27.0 - 31.0 pg    MCHC 34.3 32.0 - 36.0 g/dL    RDW 13.4 11.5 - 14.5 %    Platelets 322 150 - 450 K/uL    MPV 9.5 9.2 - 12.9 fL    Immature Granulocytes 0.5 0.0 - 0.5 %    Gran # (ANC) 8.5 (H) 1.8 - 7.7 K/uL    Immature Grans (Abs) 0.06 (H) 0.00 - 0.04 K/uL    Lymph # 3.4 1.0 - 4.8 K/uL    Mono # 1.0 0.3 - 1.0 K/uL    Eos # 0.2 0.0 - 0.5 K/uL    Baso # 0.10 0.00 - 0.20 K/uL    nRBC 0 0 /100 WBC    Gran % 63.8 38.0 - 73.0 %    Lymph % " 25.9 18.0 - 48.0 %    Mono % 7.7 4.0 - 15.0 %    Eosinophil % 1.3 0.0 - 8.0 %    Basophil % 0.8 0.0 - 1.9 %    Differential Method Automated    Comprehensive Metabolic Panel   Result Value Ref Range    Sodium 140 136 - 145 mmol/L    Potassium 4.1 3.5 - 5.1 mmol/L    Chloride 105 95 - 110 mmol/L    CO2 26 23 - 29 mmol/L    Glucose 114 (H) 70 - 110 mg/dL    BUN 14 8 - 23 mg/dL    Creatinine 1.2 0.5 - 1.4 mg/dL    Calcium 9.7 8.7 - 10.5 mg/dL    Total Protein 7.4 6.0 - 8.4 g/dL    Albumin 4.3 3.5 - 5.2 g/dL    Total Bilirubin 0.5 0.1 - 1.0 mg/dL    Alkaline Phosphatase 64 55 - 135 U/L    AST 28 10 - 40 U/L    ALT 37 10 - 44 U/L    eGFR >60.0 >60 mL/min/1.73 m^2    Anion Gap 9 8 - 16 mmol/L   Magnesium   Result Value Ref Range    Magnesium 2.2 1.6 - 2.6 mg/dL   CK   Result Value Ref Range     20 - 200 U/L   Urinalysis, Reflex to Urine Culture Urine, Clean Catch    Specimen: Urine   Result Value Ref Range    Specimen UA Urine, Clean Catch     Color, UA Yellow Yellow, Straw, Abbey    Appearance, UA Clear Clear    pH, UA 6.0 5.0 - 8.0    Specific Gravity, UA <=1.005 (A) 1.005 - 1.030    Protein, UA Negative Negative    Glucose, UA Negative Negative    Ketones, UA Negative Negative    Bilirubin (UA) Negative Negative    Occult Blood UA Negative Negative    Nitrite, UA Negative Negative    Urobilinogen, UA Negative <2.0 EU/dL    Leukocytes, UA Negative Negative         The EKG was ordered, reviewed, and independently interpreted by the ED provider.  EKG:  Rate 91 beats per minute.  Left axis deviation.  No ST segment elevation.  No STEMI.  Sinus arrhythmia is present this is    ECG Results              EKG 12-lead (In process)  Result time 07/03/23 14:04:38      In process by Interface, Lab In [facility] (07/03/23 14:04:38)                   Narrative:    Test Reason : R42,    Vent. Rate : 091 BPM     Atrial Rate : 091 BPM     P-R Int : 146 ms          QRS Dur : 092 ms      QT Int : 368 ms       P-R-T Axes : 072 -43 052  degrees     QTc Int : 452 ms    Sinus rhythm with marked sinus arrythmia  Left axis deviation  Abnormal ECG  When compared with ECG of 19-DEC-2021 13:40,  Premature atrial complexes are no longer Present    Referred By: [email or web address]   SELF           Confirmed By:                                     Imaging Results:  Imaging Results    None               The Emergency Provider reviewed the vital signs and test results, which are outlined above.     ED Discussion             13:45  Reassessment: Dr. Jeff reassessed the pt.  The pt is resting comfortably and is NAD.  Pt states their sx have improved. Discussed test results, shared treatment plan, specific conditions for return, and the need for f/u.  Answered their questions at this time.  Pt understands and agrees to the plan.  The pt has remained hemodynamically stable through ED course and is stable for discharge.      I discussed with patient and/or family/caretaker that evaluation in the ED does not suggest any emergent or life threatening medical conditions requiring immediate intervention beyond what was provided in the ED, and I believe patient is safe for discharge.  Regardless, an unremarkable evaluation in the ED does not preclude the development or presence of a serious of life threatening condition. As such, patient was instructed to return immediately for any worsening or change in current symptoms.      ED Medication(s):  Medications   sodium chloride 0.9% bolus 1,000 mL 1,000 mL (0 mLs Intravenous Stopped 7/3/23 1402)         Medical Decision Making   Medical Decision Making  Patient works out in the heat.  Been extreme temperature in [place] the past week.  Patient feels dehydrated.  Not associated with any chest pain, numbness or weakness to 1 side, slurred speech, difficulty talking, syncope, abdominal pain.    Differential diagnosis includes: Dehydration, electrolyte abnormality, acute kidney injury, rhabdomyolysis    Patient received 1 L normal  "saline.  Patient was re-evaluated.  Patient stated that he "felt like he was at peace".  Patient reports that he was feeling much better.  Return precautions given    Amount and/or Complexity of Data Reviewed  Labs: ordered. Decision-making details documented in ED Course.     Details: CPK normal at 149, blood sugar mildly elevated at 114 white cell count mildly elevated at 13.27.  ECG/medicine tests: ordered.          Discussed case with:N/A    Additional MDM:     NIH Stroke Scale:   Interval = baseline (upon arrival/admit)  Level of consciousness = 0 - alert  LOC questions = 0 - answers both correctly  LOC commands = 0 - performs both correctly  Best gaze = 0 - normal  Visual = 0 - no visual loss  Facial palsy = 0 - normal  Motor left arm =  0 - no drift  Motor right arm =  0 - no drift  Motor left leg = 0 - no drift  Motor right leg =  0 - no drift  Limb ataxia = 0 - absent  Sensory = 0 - normal  Best language = 0 - no aphasia  Dysarthria = 0 - normal articulation  Extinction and inattention = 0 - no neglect  NIH Stroke Scale Total = 0       MIPS Measures     Smoker? Yes     Hypertension: History of Hypertension: The patient has elevated blood pressure (higher than 120/80) while being treated in the ED but has a history of hypertension.      Portions of this note may have been created with voice recognition software. Occasional "wrong-word" or "sound-a-like" substitutions may have occurred due to the inherent limitations of voice recognition software. Please, read the note carefully and recognize, using context, where substitutions have occurred.            Clinical Impression       ICD-10-CM ICD-9-CM   1. Heat exhaustion, initial encounter  T67.5XXA 992.5   2. Dizziness  R42 780.4         ED Disposition       Disposition: Discharge to home  Patient condition: Good    Medication List     Medication List        ASK your doctor about these medications      albuterol 90 mcg/actuation inhaler  Commonly known as: " PROVENTIL/VENTOLIN HFA     ALPRAZolam 0.5 MG tablet  Commonly known as: XANAX  Take 1 tablet (0.5 mg total) by mouth 3 (three) times daily as needed for Anxiety.     aspirin 81 MG EC tablet  Commonly known as: ECOTRIN     lisinopriL-hydrochlorothiazide 20-12.5 mg per tablet  Commonly known as: PRINZIDE,ZESTORETIC     naproxen 500 MG tablet  Commonly known as: NAPROSYN  Take 1 tablet (500 mg total) by mouth 2 (two) times daily with meals.     pantoprazole 20 MG tablet  Commonly known as: PROTONIX     SYMBICORT INHL              ED Follow-up   Follow-up Information       JOHN Corley In 2 days.    Specialty: Family Medicine  Why: Return to emergency department for chest pain, chest pressure, shortness of breath, difficulty breathing, decreased urination, passing out, numbness or weakness to 1 side, slurred speech, loss of vision, or other concerns  Contact information:  [address] [facility]  [place] [address] [phone]                                      Lidia Jeff,   07/03/23 3910

## 2023-07-04 LAB
HCV AB SERPL QL IA: NEGATIVE
HEP C VIRUS HOLD SPECIMEN: NORMAL
HIV 1+2 AB+HIV1 P24 AG SERPL QL IA: NEGATIVE

## 2024-02-03 ENCOUNTER — HOSPITAL ENCOUNTER (INPATIENT)
Facility: HOSPITAL | Age: 69
LOS: 1 days | Discharge: HOME | DRG: 322 | End: 2024-02-04
Attending: EMERGENCY MEDICINE | Admitting: HOSPITALIST
Payer: MEDICARE

## 2024-02-03 DIAGNOSIS — I21.4 NON-ST ELEVATION MYOCARDIAL INFARCTION (NSTEMI): ICD-10-CM

## 2024-02-03 DIAGNOSIS — R07.9 CHEST PAIN: ICD-10-CM

## 2024-02-03 DIAGNOSIS — J44.1 COPD EXACERBATION: ICD-10-CM

## 2024-02-03 DIAGNOSIS — R73.9 HYPERGLYCEMIA: ICD-10-CM

## 2024-02-03 DIAGNOSIS — I21.4 NSTEMI (NON-ST ELEVATED MYOCARDIAL INFARCTION): Primary | ICD-10-CM

## 2024-02-03 DIAGNOSIS — R06.02 SOB (SHORTNESS OF BREATH): ICD-10-CM

## 2024-02-03 DIAGNOSIS — I10 HYPERTENSION, UNSPECIFIED TYPE: ICD-10-CM

## 2024-02-03 DIAGNOSIS — I25.110 CORONARY ARTERY DISEASE INVOLVING NATIVE CORONARY ARTERY OF NATIVE HEART WITH UNSTABLE ANGINA PECTORIS: ICD-10-CM

## 2024-02-03 DIAGNOSIS — I21.4 NSTEMI (NON-ST ELEVATION MYOCARDIAL INFARCTION): ICD-10-CM

## 2024-02-03 PROBLEM — R79.89 ELEVATED TROPONIN: Status: ACTIVE | Noted: 2024-02-03

## 2024-02-03 LAB
ABO + RH BLD: NORMAL
ALBUMIN SERPL BCP-MCNC: 4 G/DL (ref 3.5–5.2)
ALP SERPL-CCNC: 72 U/L (ref 55–135)
ALT SERPL W/O P-5'-P-CCNC: 33 U/L (ref 10–44)
ANION GAP SERPL CALC-SCNC: 11 MMOL/L (ref 8–16)
AORTIC ROOT ANNULUS: 3.21 CM
APTT PPP: 27.6 SEC (ref 21–32)
APTT PPP: 41.3 SEC (ref 21–32)
APTT PPP: 42.6 SEC (ref 21–32)
APTT PPP: 44.6 SEC (ref 21–32)
ASCENDING AORTA: 3.06 CM
AST SERPL-CCNC: 29 U/L (ref 10–40)
AV INDEX (PROSTH): 0.7
AV MEAN GRADIENT: 5 MMHG
AV PEAK GRADIENT: 8 MMHG
AV VALVE AREA BY VELOCITY RATIO: 2.33 CM²
AV VALVE AREA: 2.18 CM²
AV VELOCITY RATIO: 0.74
BASOPHILS # BLD AUTO: 0.11 K/UL (ref 0–0.2)
BASOPHILS # BLD AUTO: 0.12 K/UL (ref 0–0.2)
BASOPHILS NFR BLD: 1 % (ref 0–1.9)
BASOPHILS NFR BLD: 1.2 % (ref 0–1.9)
BILIRUB SERPL-MCNC: 0.3 MG/DL (ref 0.1–1)
BLD GP AB SCN CELLS X3 SERPL QL: NORMAL
BNP SERPL-MCNC: 19 PG/ML (ref 0–99)
BSA FOR ECHO PROCEDURE: 2.16 M2
BUN SERPL-MCNC: 16 MG/DL (ref 8–23)
CALCIUM SERPL-MCNC: 9.4 MG/DL (ref 8.7–10.5)
CATH EF QUANTITATIVE: 50 %
CHLORIDE SERPL-SCNC: 104 MMOL/L (ref 95–110)
CHOLEST SERPL-MCNC: 198 MG/DL (ref 120–199)
CHOLEST/HDLC SERPL: 6.6 {RATIO} (ref 2–5)
CO2 SERPL-SCNC: 26 MMOL/L (ref 23–29)
CREAT SERPL-MCNC: 1.4 MG/DL (ref 0.5–1.4)
CV ECHO LV RWT: 0.56 CM
D DIMER PPP IA.FEU-MCNC: 0.3 MG/L FEU
DIFFERENTIAL METHOD BLD: ABNORMAL
DIFFERENTIAL METHOD BLD: NORMAL
DOP CALC AO PEAK VEL: 1.44 M/S
DOP CALC AO VTI: 30.2 CM
DOP CALC LVOT AREA: 3.1 CM2
DOP CALC LVOT DIAMETER: 2 CM
DOP CALC LVOT PEAK VEL: 1.07 M/S
DOP CALC LVOT STROKE VOLUME: 65.94 CM3
DOP CALC RVOT PEAK VEL: 0.59 M/S
DOP CALC RVOT VTI: 13.3 CM
DOP CALCLVOT PEAK VEL VTI: 21 CM
E WAVE DECELERATION TIME: 165.86 MSEC
E/A RATIO: 0.75
E/E' RATIO: 5.5 M/S
ECHO LV POSTERIOR WALL: 1.28 CM (ref 0.6–1.1)
EJECTION FRACTION: 50 %
EOSINOPHIL # BLD AUTO: 0.1 K/UL (ref 0–0.5)
EOSINOPHIL # BLD AUTO: 0.2 K/UL (ref 0–0.5)
EOSINOPHIL NFR BLD: 1.2 % (ref 0–8)
EOSINOPHIL NFR BLD: 1.8 % (ref 0–8)
ERYTHROCYTE [DISTWIDTH] IN BLOOD BY AUTOMATED COUNT: 12.8 % (ref 11.5–14.5)
ERYTHROCYTE [DISTWIDTH] IN BLOOD BY AUTOMATED COUNT: 13.2 % (ref 11.5–14.5)
EST. GFR  (NO RACE VARIABLE): 54.4 ML/MIN/1.73 M^2
ESTIMATED AVG GLUCOSE: 123 MG/DL (ref 68–131)
FRACTIONAL SHORTENING: 31 % (ref 28–44)
GLUCOSE SERPL-MCNC: 177 MG/DL (ref 70–110)
HBA1C MFR BLD: 5.9 % (ref 4–5.6)
HCT VFR BLD AUTO: 43.6 % (ref 40–54)
HCT VFR BLD AUTO: 49.2 % (ref 40–54)
HDLC SERPL-MCNC: 30 MG/DL (ref 40–75)
HDLC SERPL: 15.2 % (ref 20–50)
HGB BLD-MCNC: 14.6 G/DL (ref 14–18)
HGB BLD-MCNC: 16.4 G/DL (ref 14–18)
IMM GRANULOCYTES # BLD AUTO: 0.03 K/UL (ref 0–0.04)
IMM GRANULOCYTES # BLD AUTO: 0.03 K/UL (ref 0–0.04)
IMM GRANULOCYTES NFR BLD AUTO: 0.3 % (ref 0–0.5)
IMM GRANULOCYTES NFR BLD AUTO: 0.3 % (ref 0–0.5)
INR PPP: 0.9 (ref 0.8–1.2)
INTERVENTRICULAR SEPTUM: 1.35 CM (ref 0.6–1.1)
IVC DIAMETER: 1.81 CM
IVRT: 76.12 MSEC
LA MAJOR: 4.8 CM
LA MINOR: 4.9 CM
LA WIDTH: 2.9 CM
LDLC SERPL CALC-MCNC: 126 MG/DL (ref 63–159)
LEFT ATRIUM SIZE: 3.1 CM
LEFT ATRIUM VOLUME INDEX: 17.6 ML/M2
LEFT ATRIUM VOLUME: 37.06 CM3
LEFT INTERNAL DIMENSION IN SYSTOLE: 3.17 CM (ref 2.1–4)
LEFT VENTRICLE DIASTOLIC VOLUME INDEX: 46.1 ML/M2
LEFT VENTRICLE DIASTOLIC VOLUME: 96.81 ML
LEFT VENTRICLE MASS INDEX: 111 G/M2
LEFT VENTRICLE SYSTOLIC VOLUME INDEX: 19.1 ML/M2
LEFT VENTRICLE SYSTOLIC VOLUME: 40.06 ML
LEFT VENTRICULAR INTERNAL DIMENSION IN DIASTOLE: 4.59 CM (ref 3.5–6)
LEFT VENTRICULAR MASS: 233.28 G
LV LATERAL E/E' RATIO: 6.11 M/S
LV SEPTAL E/E' RATIO: 5 M/S
LVOT MG: 3.55 MMHG
LVOT MV: 0.94 CM/S
LYMPHOCYTES # BLD AUTO: 2.4 K/UL (ref 1–4.8)
LYMPHOCYTES # BLD AUTO: 3.4 K/UL (ref 1–4.8)
LYMPHOCYTES NFR BLD: 24.1 % (ref 18–48)
LYMPHOCYTES NFR BLD: 30 % (ref 18–48)
MCH RBC QN AUTO: 30.2 PG (ref 27–31)
MCH RBC QN AUTO: 30.4 PG (ref 27–31)
MCHC RBC AUTO-ENTMCNC: 33.3 G/DL (ref 32–36)
MCHC RBC AUTO-ENTMCNC: 33.5 G/DL (ref 32–36)
MCV RBC AUTO: 90 FL (ref 82–98)
MCV RBC AUTO: 91 FL (ref 82–98)
MONOCYTES # BLD AUTO: 0.8 K/UL (ref 0.3–1)
MONOCYTES # BLD AUTO: 0.8 K/UL (ref 0.3–1)
MONOCYTES NFR BLD: 7.2 % (ref 4–15)
MONOCYTES NFR BLD: 8 % (ref 4–15)
MV PEAK A VEL: 0.73 M/S
MV PEAK E VEL: 0.55 M/S
MV STENOSIS PRESSURE HALF TIME: 48.1 MS
MV VALVE AREA P 1/2 METHOD: 4.57 CM2
NEUTROPHILS # BLD AUTO: 6.6 K/UL (ref 1.8–7.7)
NEUTROPHILS # BLD AUTO: 6.7 K/UL (ref 1.8–7.7)
NEUTROPHILS NFR BLD: 59.7 % (ref 38–73)
NEUTROPHILS NFR BLD: 65.2 % (ref 38–73)
NONHDLC SERPL-MCNC: 168 MG/DL
NRBC BLD-RTO: 0 /100 WBC
NRBC BLD-RTO: 0 /100 WBC
PISA MRMAX VEL: 2.94 M/S
PISA TR MAX VEL: 1.66 M/S
PLATELET # BLD AUTO: 141 K/UL (ref 150–450)
PLATELET # BLD AUTO: 279 K/UL (ref 150–450)
PLATELET BLD QL SMEAR: NORMAL
PMV BLD AUTO: 10.8 FL (ref 9.2–12.9)
PMV BLD AUTO: 9.6 FL (ref 9.2–12.9)
POCT GLUCOSE: 119 MG/DL (ref 70–110)
POCT GLUCOSE: 135 MG/DL (ref 70–110)
POCT GLUCOSE: 96 MG/DL (ref 70–110)
POTASSIUM SERPL-SCNC: 4.1 MMOL/L (ref 3.5–5.1)
PROT SERPL-MCNC: 7.3 G/DL (ref 6–8.4)
PROTHROMBIN TIME: 10.6 SEC (ref 9–12.5)
PV MEAN GRADIENT: 1 MMHG
RA MAJOR: 3.52 CM
RA PRESSURE ESTIMATED: 3 MMHG
RA WIDTH: 2.8 CM
RBC # BLD AUTO: 4.83 M/UL (ref 4.6–6.2)
RBC # BLD AUTO: 5.4 M/UL (ref 4.6–6.2)
RV TB RVSP: 5 MMHG
SODIUM SERPL-SCNC: 141 MMOL/L (ref 136–145)
SPECIMEN OUTDATE: NORMAL
STJ: 3.15 CM
TDI LATERAL: 0.09 M/S
TDI SEPTAL: 0.11 M/S
TDI: 0.1 M/S
TR MAX PG: 11 MMHG
TRICUSPID ANNULAR PLANE SYSTOLIC EXCURSION: 1.82 CM
TRIGL SERPL-MCNC: 210 MG/DL (ref 30–150)
TROPONIN I SERPL DL<=0.01 NG/ML-MCNC: 0.48 NG/ML (ref 0–0.03)
TROPONIN I SERPL DL<=0.01 NG/ML-MCNC: 0.93 NG/ML (ref 0–0.03)
TROPONIN I SERPL DL<=0.01 NG/ML-MCNC: 2.88 NG/ML (ref 0–0.03)
TROPONIN I SERPL DL<=0.01 NG/ML-MCNC: 6.21 NG/ML (ref 0–0.03)
TROPONIN I SERPL DL<=0.01 NG/ML-MCNC: 8.95 NG/ML (ref 0–0.03)
TV REST PULMONARY ARTERY PRESSURE: 14 MMHG
WBC # BLD AUTO: 10.11 K/UL (ref 3.9–12.7)
WBC # BLD AUTO: 11.2 K/UL (ref 3.9–12.7)
Z-SCORE OF LEFT VENTRICULAR DIMENSION IN END DIASTOLE: -3.52
Z-SCORE OF LEFT VENTRICULAR DIMENSION IN END SYSTOLE: -1.82

## 2024-02-03 PROCEDURE — 85610 PROTHROMBIN TIME: CPT | Mod: ER | Performed by: EMERGENCY MEDICINE

## 2024-02-03 PROCEDURE — C1769 GUIDE WIRE: HCPCS | Performed by: INTERNAL MEDICINE

## 2024-02-03 PROCEDURE — 63600175 PHARM REV CODE 636 W HCPCS: Mod: ER | Performed by: EMERGENCY MEDICINE

## 2024-02-03 PROCEDURE — 99900035 HC TECH TIME PER 15 MIN (STAT)

## 2024-02-03 PROCEDURE — C9600 PERC DRUG-EL COR STENT SING: HCPCS | Mod: LD | Performed by: INTERNAL MEDICINE

## 2024-02-03 PROCEDURE — 85379 FIBRIN DEGRADATION QUANT: CPT | Mod: ER | Performed by: EMERGENCY MEDICINE

## 2024-02-03 PROCEDURE — 99291 CRITICAL CARE FIRST HOUR: CPT

## 2024-02-03 PROCEDURE — 25000003 PHARM REV CODE 250: Mod: ER | Performed by: EMERGENCY MEDICINE

## 2024-02-03 PROCEDURE — 63600175 PHARM REV CODE 636 W HCPCS: Performed by: INTERNAL MEDICINE

## 2024-02-03 PROCEDURE — 85025 COMPLETE CBC W/AUTO DIFF WBC: CPT | Mod: ER | Performed by: EMERGENCY MEDICINE

## 2024-02-03 PROCEDURE — C1887 CATHETER, GUIDING: HCPCS | Performed by: INTERNAL MEDICINE

## 2024-02-03 PROCEDURE — 85730 THROMBOPLASTIN TIME PARTIAL: CPT | Mod: 91 | Performed by: INTERNAL MEDICINE

## 2024-02-03 PROCEDURE — 25000003 PHARM REV CODE 250: Performed by: INTERNAL MEDICINE

## 2024-02-03 PROCEDURE — 027034Z DILATION OF CORONARY ARTERY, ONE ARTERY WITH DRUG-ELUTING INTRALUMINAL DEVICE, PERCUTANEOUS APPROACH: ICD-10-PCS | Performed by: INTERNAL MEDICINE

## 2024-02-03 PROCEDURE — 92928 PRQ TCAT PLMT NTRAC ST 1 LES: CPT | Mod: LD,,, | Performed by: INTERNAL MEDICINE

## 2024-02-03 PROCEDURE — 36415 COLL VENOUS BLD VENIPUNCTURE: CPT | Performed by: INTERNAL MEDICINE

## 2024-02-03 PROCEDURE — C1874 STENT, COATED/COV W/DEL SYS: HCPCS | Performed by: INTERNAL MEDICINE

## 2024-02-03 PROCEDURE — 85730 THROMBOPLASTIN TIME PARTIAL: CPT | Mod: 91 | Performed by: EMERGENCY MEDICINE

## 2024-02-03 PROCEDURE — 25000003 PHARM REV CODE 250: Performed by: STUDENT IN AN ORGANIZED HEALTH CARE EDUCATION/TRAINING PROGRAM

## 2024-02-03 PROCEDURE — 86901 BLOOD TYPING SEROLOGIC RH(D): CPT | Performed by: NURSE PRACTITIONER

## 2024-02-03 PROCEDURE — 93005 ELECTROCARDIOGRAM TRACING: CPT | Mod: ER

## 2024-02-03 PROCEDURE — 80061 LIPID PANEL: CPT | Performed by: EMERGENCY MEDICINE

## 2024-02-03 PROCEDURE — 84484 ASSAY OF TROPONIN QUANT: CPT | Mod: 91 | Performed by: NURSE PRACTITIONER

## 2024-02-03 PROCEDURE — 93799 UNLISTED CV SVC/PROCEDURE: CPT | Mod: RI | Performed by: INTERNAL MEDICINE

## 2024-02-03 PROCEDURE — 83880 ASSAY OF NATRIURETIC PEPTIDE: CPT | Mod: ER | Performed by: EMERGENCY MEDICINE

## 2024-02-03 PROCEDURE — 99153 MOD SED SAME PHYS/QHP EA: CPT | Performed by: INTERNAL MEDICINE

## 2024-02-03 PROCEDURE — 99223 1ST HOSP IP/OBS HIGH 75: CPT | Mod: 25,,, | Performed by: STUDENT IN AN ORGANIZED HEALTH CARE EDUCATION/TRAINING PROGRAM

## 2024-02-03 PROCEDURE — 85025 COMPLETE CBC W/AUTO DIFF WBC: CPT | Mod: 91 | Performed by: EMERGENCY MEDICINE

## 2024-02-03 PROCEDURE — 93571 IV DOP VEL&/PRESS C FLO 1ST: CPT | Mod: 26,52,RI, | Performed by: INTERNAL MEDICINE

## 2024-02-03 PROCEDURE — 21400001 HC TELEMETRY ROOM

## 2024-02-03 PROCEDURE — 83036 HEMOGLOBIN GLYCOSYLATED A1C: CPT | Performed by: EMERGENCY MEDICINE

## 2024-02-03 PROCEDURE — 25500020 PHARM REV CODE 255: Performed by: INTERNAL MEDICINE

## 2024-02-03 PROCEDURE — 80053 COMPREHEN METABOLIC PANEL: CPT | Mod: ER | Performed by: EMERGENCY MEDICINE

## 2024-02-03 PROCEDURE — 4A033BC MEASUREMENT OF ARTERIAL PRESSURE, CORONARY, PERCUTANEOUS APPROACH: ICD-10-PCS | Performed by: INTERNAL MEDICINE

## 2024-02-03 PROCEDURE — 85730 THROMBOPLASTIN TIME PARTIAL: CPT | Mod: ER | Performed by: EMERGENCY MEDICINE

## 2024-02-03 PROCEDURE — C1894 INTRO/SHEATH, NON-LASER: HCPCS | Performed by: INTERNAL MEDICINE

## 2024-02-03 PROCEDURE — 93458 L HRT ARTERY/VENTRICLE ANGIO: CPT | Mod: 26,59,51, | Performed by: INTERNAL MEDICINE

## 2024-02-03 PROCEDURE — S4991 NICOTINE PATCH NONLEGEND: HCPCS | Performed by: INTERNAL MEDICINE

## 2024-02-03 PROCEDURE — 99152 MOD SED SAME PHYS/QHP 5/>YRS: CPT | Performed by: INTERNAL MEDICINE

## 2024-02-03 PROCEDURE — 93010 ELECTROCARDIOGRAM REPORT: CPT | Mod: 76,,, | Performed by: STUDENT IN AN ORGANIZED HEALTH CARE EDUCATION/TRAINING PROGRAM

## 2024-02-03 PROCEDURE — 84484 ASSAY OF TROPONIN QUANT: CPT | Mod: ER | Performed by: EMERGENCY MEDICINE

## 2024-02-03 PROCEDURE — B2111ZZ FLUOROSCOPY OF MULTIPLE CORONARY ARTERIES USING LOW OSMOLAR CONTRAST: ICD-10-PCS | Performed by: INTERNAL MEDICINE

## 2024-02-03 PROCEDURE — 96374 THER/PROPH/DIAG INJ IV PUSH: CPT

## 2024-02-03 PROCEDURE — 93010 ELECTROCARDIOGRAM REPORT: CPT | Mod: ,,, | Performed by: STUDENT IN AN ORGANIZED HEALTH CARE EDUCATION/TRAINING PROGRAM

## 2024-02-03 PROCEDURE — 93458 L HRT ARTERY/VENTRICLE ANGIO: CPT | Mod: 59 | Performed by: INTERNAL MEDICINE

## 2024-02-03 PROCEDURE — 4A023N7 MEASUREMENT OF CARDIAC SAMPLING AND PRESSURE, LEFT HEART, PERCUTANEOUS APPROACH: ICD-10-PCS | Performed by: INTERNAL MEDICINE

## 2024-02-03 PROCEDURE — 85347 COAGULATION TIME ACTIVATED: CPT | Performed by: INTERNAL MEDICINE

## 2024-02-03 PROCEDURE — C1725 CATH, TRANSLUMIN NON-LASER: HCPCS | Performed by: INTERNAL MEDICINE

## 2024-02-03 PROCEDURE — 96375 TX/PRO/DX INJ NEW DRUG ADDON: CPT

## 2024-02-03 PROCEDURE — 27201423 OPTIME MED/SURG SUP & DEVICES STERILE SUPPLY: Performed by: INTERNAL MEDICINE

## 2024-02-03 PROCEDURE — B2151ZZ FLUOROSCOPY OF LEFT HEART USING LOW OSMOLAR CONTRAST: ICD-10-PCS | Performed by: INTERNAL MEDICINE

## 2024-02-03 PROCEDURE — 94761 N-INVAS EAR/PLS OXIMETRY MLT: CPT

## 2024-02-03 PROCEDURE — 99152 MOD SED SAME PHYS/QHP 5/>YRS: CPT | Mod: ,,, | Performed by: INTERNAL MEDICINE

## 2024-02-03 DEVICE — EVEROLIMUS-ELUTING PLATINUM CHROMIUM CORONARY STENT SYSTEM
Type: IMPLANTABLE DEVICE | Site: HEART | Status: FUNCTIONAL
Brand: SYNERGY™ XD

## 2024-02-03 RX ORDER — FLUTICASONE PROPIONATE AND SALMETEROL 250; 50 UG/1; UG/1
1 POWDER RESPIRATORY (INHALATION) 2 TIMES DAILY
COMMUNITY
Start: 2023-10-12

## 2024-02-03 RX ORDER — ONDANSETRON 8 MG/1
8 TABLET, ORALLY DISINTEGRATING ORAL EVERY 8 HOURS PRN
Status: DISCONTINUED | OUTPATIENT
Start: 2024-02-03 | End: 2024-02-04 | Stop reason: HOSPADM

## 2024-02-03 RX ORDER — LORAZEPAM 2 MG/ML
1 INJECTION INTRAMUSCULAR
Status: COMPLETED | OUTPATIENT
Start: 2024-02-03 | End: 2024-02-03

## 2024-02-03 RX ORDER — PREDNISONE 10 MG/1
10 TABLET ORAL 2 TIMES DAILY
COMMUNITY
Start: 2024-01-04 | End: 2024-02-03 | Stop reason: ALTCHOICE

## 2024-02-03 RX ORDER — SODIUM CHLORIDE 0.9 % (FLUSH) 0.9 %
10 SYRINGE (ML) INJECTION
Status: DISCONTINUED | OUTPATIENT
Start: 2024-02-03 | End: 2024-02-04 | Stop reason: HOSPADM

## 2024-02-03 RX ORDER — VERAPAMIL HYDROCHLORIDE 2.5 MG/ML
INJECTION, SOLUTION INTRAVENOUS
Status: DISCONTINUED | OUTPATIENT
Start: 2024-02-03 | End: 2024-02-03 | Stop reason: HOSPADM

## 2024-02-03 RX ORDER — ATORVASTATIN CALCIUM 10 MG/1
10 TABLET, FILM COATED ORAL NIGHTLY
Status: ON HOLD | COMMUNITY
Start: 2023-12-14 | End: 2024-02-04 | Stop reason: HOSPADM

## 2024-02-03 RX ORDER — ISOSORBIDE MONONITRATE 30 MG/1
30 TABLET, EXTENDED RELEASE ORAL DAILY
Status: DISCONTINUED | OUTPATIENT
Start: 2024-02-03 | End: 2024-02-04 | Stop reason: HOSPADM

## 2024-02-03 RX ORDER — ALPRAZOLAM 0.25 MG/1
0.25 TABLET ORAL 2 TIMES DAILY PRN
Status: DISCONTINUED | OUTPATIENT
Start: 2024-02-03 | End: 2024-02-04 | Stop reason: HOSPADM

## 2024-02-03 RX ORDER — HEPARIN SODIUM 1000 [USP'U]/ML
INJECTION, SOLUTION INTRAVENOUS; SUBCUTANEOUS
Status: DISCONTINUED | OUTPATIENT
Start: 2024-02-03 | End: 2024-02-03 | Stop reason: HOSPADM

## 2024-02-03 RX ORDER — ATORVASTATIN CALCIUM 40 MG/1
80 TABLET, FILM COATED ORAL DAILY
Status: DISCONTINUED | OUTPATIENT
Start: 2024-02-03 | End: 2024-02-04 | Stop reason: HOSPADM

## 2024-02-03 RX ORDER — IPRATROPIUM BROMIDE AND ALBUTEROL SULFATE 2.5; .5 MG/3ML; MG/3ML
3 SOLUTION RESPIRATORY (INHALATION) EVERY 4 HOURS PRN
Status: DISCONTINUED | OUTPATIENT
Start: 2024-02-03 | End: 2024-02-04 | Stop reason: HOSPADM

## 2024-02-03 RX ORDER — ASPIRIN 81 MG/1
1 TABLET ORAL DAILY
COMMUNITY
End: 2024-02-15

## 2024-02-03 RX ORDER — IBUPROFEN 200 MG
1 TABLET ORAL DAILY
Status: DISCONTINUED | OUTPATIENT
Start: 2024-02-03 | End: 2024-02-04 | Stop reason: HOSPADM

## 2024-02-03 RX ORDER — NAPROXEN SODIUM 220 MG/1
162 TABLET, FILM COATED ORAL
Status: COMPLETED | OUTPATIENT
Start: 2024-02-03 | End: 2024-02-03

## 2024-02-03 RX ORDER — HYDROCODONE BITARTRATE AND ACETAMINOPHEN 5; 325 MG/1; MG/1
1 TABLET ORAL EVERY 4 HOURS PRN
Status: DISCONTINUED | OUTPATIENT
Start: 2024-02-03 | End: 2024-02-04 | Stop reason: HOSPADM

## 2024-02-03 RX ORDER — OXYMETAZOLINE HCL 0.05 %
2 SPRAY, NON-AEROSOL (ML) NASAL 2 TIMES DAILY PRN
Status: DISCONTINUED | OUTPATIENT
Start: 2024-02-03 | End: 2024-02-04 | Stop reason: HOSPADM

## 2024-02-03 RX ORDER — SODIUM CHLORIDE 9 MG/ML
INJECTION, SOLUTION INTRAVENOUS CONTINUOUS
Status: ACTIVE | OUTPATIENT
Start: 2024-02-03 | End: 2024-02-04

## 2024-02-03 RX ORDER — NITROGLYCERIN 5 MG/ML
INJECTION, SOLUTION INTRAVENOUS
Status: DISCONTINUED | OUTPATIENT
Start: 2024-02-03 | End: 2024-02-03 | Stop reason: HOSPADM

## 2024-02-03 RX ORDER — PRASUGREL 10 MG/1
10 TABLET, FILM COATED ORAL DAILY
Status: DISCONTINUED | OUTPATIENT
Start: 2024-02-04 | End: 2024-02-04 | Stop reason: HOSPADM

## 2024-02-03 RX ORDER — ALPRAZOLAM 0.25 MG/1
0.25 TABLET ORAL
COMMUNITY
Start: 2024-01-17

## 2024-02-03 RX ORDER — LISINOPRIL AND HYDROCHLOROTHIAZIDE 12.5; 2 MG/1; MG/1
1 TABLET ORAL DAILY
Status: ON HOLD | COMMUNITY
End: 2024-02-04

## 2024-02-03 RX ORDER — MORPHINE SULFATE 2 MG/ML
2 INJECTION, SOLUTION INTRAMUSCULAR; INTRAVENOUS EVERY 4 HOURS PRN
Status: DISCONTINUED | OUTPATIENT
Start: 2024-02-03 | End: 2024-02-04 | Stop reason: HOSPADM

## 2024-02-03 RX ORDER — NAPROXEN SODIUM 220 MG/1
81 TABLET, FILM COATED ORAL DAILY
Status: DISCONTINUED | OUTPATIENT
Start: 2024-02-04 | End: 2024-02-04 | Stop reason: HOSPADM

## 2024-02-03 RX ORDER — SODIUM CHLORIDE 9 MG/ML
INJECTION, SOLUTION INTRAVENOUS CONTINUOUS
Status: DISCONTINUED | OUTPATIENT
Start: 2024-02-03 | End: 2024-02-03

## 2024-02-03 RX ORDER — METOPROLOL TARTRATE 25 MG/1
25 TABLET, FILM COATED ORAL 2 TIMES DAILY
Status: DISCONTINUED | OUTPATIENT
Start: 2024-02-03 | End: 2024-02-04 | Stop reason: HOSPADM

## 2024-02-03 RX ORDER — NITROGLYCERIN 0.4 MG/1
0.4 TABLET SUBLINGUAL EVERY 5 MIN PRN
Status: DISCONTINUED | OUTPATIENT
Start: 2024-02-03 | End: 2024-02-04 | Stop reason: HOSPADM

## 2024-02-03 RX ORDER — MORPHINE SULFATE 4 MG/ML
2 INJECTION, SOLUTION INTRAMUSCULAR; INTRAVENOUS
Status: COMPLETED | OUTPATIENT
Start: 2024-02-03 | End: 2024-02-03

## 2024-02-03 RX ORDER — BUDESONIDE 0.5 MG/2ML
0.5 INHALANT ORAL EVERY 12 HOURS
Status: DISCONTINUED | OUTPATIENT
Start: 2024-02-03 | End: 2024-02-04 | Stop reason: HOSPADM

## 2024-02-03 RX ORDER — LIDOCAINE HYDROCHLORIDE 20 MG/ML
INJECTION, SOLUTION EPIDURAL; INFILTRATION; INTRACAUDAL; PERINEURAL
Status: DISCONTINUED | OUTPATIENT
Start: 2024-02-03 | End: 2024-02-03 | Stop reason: HOSPADM

## 2024-02-03 RX ORDER — ACETAMINOPHEN 500 MG
1000 TABLET ORAL
Status: COMPLETED | OUTPATIENT
Start: 2024-02-03 | End: 2024-02-03

## 2024-02-03 RX ORDER — PRASUGREL 10 MG/1
60 TABLET, FILM COATED ORAL ONCE
Status: COMPLETED | OUTPATIENT
Start: 2024-02-03 | End: 2024-02-03

## 2024-02-03 RX ORDER — FENTANYL CITRATE 50 UG/ML
INJECTION, SOLUTION INTRAMUSCULAR; INTRAVENOUS
Status: DISCONTINUED | OUTPATIENT
Start: 2024-02-03 | End: 2024-02-03 | Stop reason: HOSPADM

## 2024-02-03 RX ORDER — MECLIZINE HYDROCHLORIDE 25 MG/1
25 TABLET ORAL 2 TIMES DAILY PRN
COMMUNITY

## 2024-02-03 RX ORDER — MIDAZOLAM HYDROCHLORIDE 1 MG/ML
INJECTION, SOLUTION INTRAMUSCULAR; INTRAVENOUS
Status: DISCONTINUED | OUTPATIENT
Start: 2024-02-03 | End: 2024-02-03 | Stop reason: HOSPADM

## 2024-02-03 RX ORDER — METOPROLOL TARTRATE 25 MG/1
50 TABLET, FILM COATED ORAL
Status: COMPLETED | OUTPATIENT
Start: 2024-02-03 | End: 2024-02-03

## 2024-02-03 RX ORDER — DIPHENHYDRAMINE HYDROCHLORIDE 50 MG/ML
INJECTION INTRAMUSCULAR; INTRAVENOUS
Status: DISCONTINUED | OUTPATIENT
Start: 2024-02-03 | End: 2024-02-03 | Stop reason: HOSPADM

## 2024-02-03 RX ORDER — MUPIROCIN 20 MG/G
OINTMENT TOPICAL 2 TIMES DAILY
COMMUNITY
Start: 2023-12-20 | End: 2024-02-03

## 2024-02-03 RX ORDER — SODIUM CHLORIDE 9 MG/ML
INJECTION, SOLUTION INTRAVENOUS ONCE
Status: COMPLETED | OUTPATIENT
Start: 2024-02-03 | End: 2024-02-03

## 2024-02-03 RX ORDER — IBUPROFEN 800 MG/1
800 TABLET ORAL EVERY 12 HOURS PRN
COMMUNITY
Start: 2023-12-20 | End: 2024-02-03

## 2024-02-03 RX ORDER — ARFORMOTEROL TARTRATE 15 UG/2ML
15 SOLUTION RESPIRATORY (INHALATION) 2 TIMES DAILY
Status: DISCONTINUED | OUTPATIENT
Start: 2024-02-03 | End: 2024-02-04 | Stop reason: HOSPADM

## 2024-02-03 RX ORDER — HEPARIN SODIUM,PORCINE/D5W 25000/250
12 INTRAVENOUS SOLUTION INTRAVENOUS CONTINUOUS
Status: DISCONTINUED | OUTPATIENT
Start: 2024-02-03 | End: 2024-02-03

## 2024-02-03 RX ADMIN — NICOTINE 1 PATCH: 21 PATCH, EXTENDED RELEASE TRANSDERMAL at 06:02

## 2024-02-03 RX ADMIN — SODIUM CHLORIDE: 9 INJECTION, SOLUTION INTRAVENOUS at 11:02

## 2024-02-03 RX ADMIN — NITROGLYCERIN 1 INCH: 20 OINTMENT TOPICAL at 12:02

## 2024-02-03 RX ADMIN — LORAZEPAM 1 MG: 2 INJECTION INTRAMUSCULAR; INTRAVENOUS at 01:02

## 2024-02-03 RX ADMIN — SODIUM CHLORIDE: 9 INJECTION, SOLUTION INTRAVENOUS at 08:02

## 2024-02-03 RX ADMIN — SODIUM CHLORIDE: 9 INJECTION, SOLUTION INTRAVENOUS at 02:02

## 2024-02-03 RX ADMIN — METOPROLOL TARTRATE 25 MG: 25 TABLET, FILM COATED ORAL at 08:02

## 2024-02-03 RX ADMIN — ACETAMINOPHEN 1000 MG: 500 TABLET ORAL at 12:02

## 2024-02-03 RX ADMIN — METOPROLOL TARTRATE 50 MG: 25 TABLET, FILM COATED ORAL at 12:02

## 2024-02-03 RX ADMIN — HEPARIN SODIUM 12 UNITS/KG/HR: 10000 INJECTION, SOLUTION INTRAVENOUS at 01:02

## 2024-02-03 RX ADMIN — ISOSORBIDE MONONITRATE 30 MG: 30 TABLET, EXTENDED RELEASE ORAL at 04:02

## 2024-02-03 RX ADMIN — ASPIRIN 162 MG: 81 TABLET, CHEWABLE ORAL at 12:02

## 2024-02-03 RX ADMIN — PRASUGREL 60 MG: 10 TABLET, FILM COATED ORAL at 01:02

## 2024-02-03 RX ADMIN — ATORVASTATIN CALCIUM 80 MG: 40 TABLET, FILM COATED ORAL at 04:02

## 2024-02-03 RX ADMIN — MORPHINE SULFATE 2 MG: 4 INJECTION INTRAVENOUS at 01:02

## 2024-02-03 NOTE — ASSESSMENT & PLAN NOTE
Patient's COPD is controlled currently.  Patient is currently off COPD Pathway. Continue scheduled inhalers  duonebs prn, Steroids, and Supplemental oxygen and monitor respiratory status closely.

## 2024-02-03 NOTE — INTERVAL H&P NOTE
The patient has been examined and the H&P has been reviewed:    I concur with the findings and no changes have occurred since H&P was written.    Anesthesia/Surgery risks, benefits and alternative options discussed and understood by patient/family.          Active Hospital Problems    Diagnosis  POA    *NSTEMI (non-ST elevated myocardial infarction) [I21.4]  Yes    Elevated troponin [R79.89]  Yes    HTN (hypertension) [I10]  Yes    Tobacco abuse [Z72.0]  Yes    Anxiety [F41.9]  Yes    COPD exacerbation [J44.1]  No      Resolved Hospital Problems   No resolved problems to display.

## 2024-02-03 NOTE — OP NOTE
[facility] - Cath Lab ([facility])  Cardiac Catheterization  Procedure Note    SUMMARY     Eber Wilson  [id number]  Michael Lopez, FNP-C    Date of Procedure: 2/3/2024    Procedure: 1. LHC 2. LEFT VENTRICULOGRAM 3. CORONARY ANGIOGRAM 4. PCI MID LAD    5. iFR OSTIAL RAMUS    Provider: Nilo Chavira MD    Indications: He was referred for cardiac catheterization to further evaluate NSTEMI.    Pre-Procedure Diagnosis: NSTEMI    Post-Procedure Diagnosis: PCI LAD    Anesthesia: RN IV Sedation    Description of the Findings of the Procedure:     The risks, benefits, complications, treatment options, and expected outcomes were discussed with the patient. The patient and/or family concurred with the proposed plan, giving informed consent. Patient was brought to the cath lab after IV hydration was begun and oral premedication was given.     Findings:    PCI 99% mid LAD tx w Synergy BARAK 2.5 x 28 mm x one (posted to 3.0 mm).  60 - 70% ostial Ramus: iFR 0.86  50% ectatic mid RCA.  EF 50% w apical WMA.  LVEDP 31  Left radial TR band.  See report.      Complications: None; patient tolerated the procedure well.    Estimated Blood Loss (EBL): Minimal           Implants: BARAK x one    Specimens: none    Condition: stable    Disposition: PACU - hemodynamically stable.    Attestation: I was present and scrubbed for the entire procedure.    Recommendations    Usual post PCI care.  DAPT (Effient) x one year.  OMT for CAD.  Smoking cessation.  Tx back to floor.

## 2024-02-03 NOTE — CONSULTS
"  [facility] - Emergency Dept.  Adult Nutrition  Consult Note    SUMMARY     Recommendations    Recommendation/Intervention:   1. Recommend Cardiac diet   2. Recommend Boost plus BID   3. Recommend Blaine BID for wound healing   4. Weight twice weekly    Goals:   1. Pt will tolerate and consume > 75% EEN and EPN prior to RD follow up   2. Pt will consume Blaine BID prior to RD follow up  Nutrition Goal Status: new  Communication of RD Recs: other (comment) (POC, sticky note)    Assessment and Plan    Nutrition Problem  Inadequate protein-energy intake   Food and nutrition related knowledge deficit   Increased protein needs     Related to (etiology):   Decreased ability to consume sufficient protein/energy   Lack of prior exposure to accurate nutrition related information   Increased demand for nutrition     Signs and Symptoms (as evidenced by):   NPO   HTN, HDL (L), TG (H), NSTEMI   Surgery, Wound healing needs     Interventions/Recommendations (treatment strategy):  1. Decreased sodium and fat modified diet  2. Commercial beverage   3. Content related nutrition education   4. Vitamin and mineral supplement therapy for wound healing   5. Collaboration with other providers     Nutrition Diagnosis Status:   New       Malnutrition Assessment     Skin (Micronutrient): none                                 Reason for Assessment    Reason For Assessment: consult (NSTEMI)  Diagnosis:  (NSTEMI)  Relevant Medical History: COPD exacerbation, Tobacco abuse, Anxiety, Elevated troponin, HTN  General Information Comments:   2/3/24: 69 y.o. Male admitted for NSTEMI. Pt currently NPO, currently in ED. RD consulted for PO intake assesment and Cardiac diet education, NSTEMI. H&P noted that the pt  transferred from our [facility] facility for NSTEMI and Cardiology consult, pt reported chest discomfort in bilateral arm pain x 2 last night, noted pt currently asymptomatic and denied any other associated sx. RD attached "Heart Healthy Diet" and " ""Low Salt Diet" nutrition education to pt discharge papers, will assess pt's PO intake, provide handouts and dicuss nutritional education at follow up. Reviewed chart: Skin: WDL; Edema: None. Labs, meds, weight reviewed. Weight charted 7/3 209 lbs, 2/3 215 lbs (BMI 32.69, Obese), +6 lb wt gain x 7 months. No NFPE warranted at this time, BMI > 30 and pt in ED, will perform at follow up if warranted. RD will continue to follow and monitor pt's nutritional status during admit.    Nutrition Discharge Planning: Cardaic diet + Blaine BID    Nutrition Risk Screen    Nutrition Risk Screen: no indicators present    Nutrition/Diet History    Spiritual, Cultural Beliefs, Christianity Practices, Values that Affect Care: no  Food Allergies: NKFA  Factors Affecting Nutritional Intake: NPO    Anthropometrics    Temp: 98 °F (36.7 °C)  Height Method: Stated  Height: 5' 8" (172.7 cm)  Height (inches): 68 in  Weight Method: Standard Scale  Weight: 97.5 kg (214 lb 15.2 oz)  Weight (lb): 214.95 lb  Ideal Body Weight (IBW), Male: 154 lb  % Ideal Body Weight, Male (lb): 139.58 %  BMI (Calculated): 32.7  BMI Grade: 30 - 34.9- obesity - grade I     Wt Readings from Last 15 Encounters:   02/03/24 97.5 kg (214 lb 15.2 oz)   07/03/23 95.2 kg (209 lb 14.1 oz)   12/19/21 103 kg (227 lb 1.2 oz)   01/21/20 101.6 kg (224 lb)   01/27/18 92.5 kg (204 lb)   01/21/18 94.8 kg (209 lb)   09/30/17 101.2 kg (223 lb)   08/19/17 96.6 kg (213 lb)   07/02/17 98.4 kg (217 lb)   06/02/17 96.3 kg (212 lb 3.2 oz)   05/31/17 97.5 kg (215 lb)   05/26/17 101.6 kg (224 lb)   05/11/17 101.2 kg (223 lb)   07/14/16 97.5 kg (215 lb)   07/17/15 96.6 kg (213 lb)     Lab/Procedures/Meds    Pertinent Labs Reviewed: reviewed  Pertinent Labs Comments: HDL (L), Glu (H), TG (H), Troponin (H), eGFR 54.4  Pertinent Medications Reviewed: reviewed  BMP  Lab Results   Component Value Date     02/03/2024    K 4.1 02/03/2024     02/03/2024    CO2 26 02/03/2024    BUN 16 " "02/03/2024    CREATININE 1.4 02/03/2024    CALCIUM 9.4 02/03/2024    ANIONGAP 11 02/03/2024    EGFRNORACEVR 54.4 (A) 02/03/2024     Lab Results   Component Value Date    ALBUMIN 4.0 02/03/2024     Recent Labs   Lab 02/03/24  0837   TROPONINI 6.206*     Lab Results   Component Value Date    CHOL 198 02/03/2024     Lab Results   Component Value Date    HDL 30 (L) 02/03/2024     Lab Results   Component Value Date    LDLCALC 126.0 02/03/2024     Lab Results   Component Value Date    TRIG 210 (H) 02/03/2024       Lab Results   Component Value Date    CHOLHDL 15.2 (L) 02/03/2024      No results for input(s): "POCTGLUCOSE" in the last 24 hours.    Lab Results   Component Value Date    WBC 11.20 02/03/2024    HGB 14.6 02/03/2024    HCT 43.6 02/03/2024    MCV 90 02/03/2024     02/03/2024       Scheduled Meds:   [START ON 2/4/2024] aspirin  81 mg Oral Daily     Continuous Infusions:   sodium chloride 0.9%      heparin (porcine) in D5W 12 Units/kg/hr (02/03/24 0120)     PRN Meds:.heparin (PORCINE), heparin (PORCINE), nitroGLYCERIN, sodium chloride 0.9%        Physical Findings/Assessment         Estimated/Assessed Needs    Weight Used For Calorie Calculations: 76.8 kg (169 lb 5 oz) (Adj BW)  Energy Calorie Requirements (kcal): 3385-2983 kcals (25-30 kcals/kg ABW (COPD, Obese BMI 32.68)  Energy Need Method: Kcal/kg  Protein Requirements:  g (1.0-1.5 g/kg ABW (COPD, mod stress vs Wound, surgery)  Weight Used For Protein Calculations: 97.5 kg (214 lb 15.2 oz)  Fluid Requirements (mL): 0264-1475 mL (1 mL/kcal)  Estimated Fluid Requirement Method: RDA Method  RDA Method (mL): 1920  CHO Requirement: 240-288 g (9199-8316 kcals/8)      Nutrition Prescription Ordered    Current Diet Order: NPO    Evaluation of Received Nutrient/Fluid Intake  I/O: (Net since admit)   2/3: -350 mL    Energy Calories Required: not meeting needs  Protein Required: not meeting needs  Fluid Required: not meeting needs  Tolerance:  (Currently no " intake)  % Intake of Estimated Energy Needs: 0 - 25 %  % Meal Intake: NPO    Nutrition Risk    Level of Risk/Frequency of Follow-up: high (F/u x 2 weekly)       Monitor and Evaluation    Food and Nutrient Intake: energy intake, food and beverage intake  Food and Nutrient Adminstration: diet order  Knowledge/Beliefs/Attitudes: food and nutrition knowledge/skill, beliefs and attitudes  Anthropometric Measurements: weight, weight change, body mass index  Biochemical Data, Medical Tests and Procedures: electrolyte and renal panel, glucose/endocrine profile, lipid profile       Nutrition Follow-Up    RD Follow-up?: Yes  Zulma Mccormack, Registration Eligible, Provisional LDN

## 2024-02-03 NOTE — CONSULTS
[facility] - Emergency Dept.  Cardiology  Consult Note    Patient Name: Eber Wilson  MRN: [id number]  Admission Date: 2/3/2024  Hospital Length of Stay: 0 days  Code Status: Full Code   Attending Provider: Gabi Arvizu MD   Consulting Provider: Wilmar Alfaro MD  Primary Care Physician: Michael Lopez, MIGUELITO-C  Principal Problem:NSTEMI (non-ST elevated myocardial infarction)    Patient information was obtained from patient and ER records.     Inpatient consult to Cardiology  Consult performed by: Wilmar Alfaro MD  Consult ordered by: Juwan Jose MD  Reason for consult: nstemi        Subjective:       HPI:   69 y.o. male with a PMH has a past medical history of Anxiety, COPD, HTN (white coat syndrome), tobacco abuse who comes in with bilateral arm pain and chest discomfort that occurred the day PTA. Reports lifting groceries and started having arm pain that became intermittent, had some chest discomfort and SOB. He went to sleep but symptoms did not improve, took 2 ASA then went to ED. Initial troponin 0.48 and trended up to 6.2.  Placed on heparin infusion  Given nitropaste, morphine and metoprolol   EKG with NSR and inferior infarct  Currently without symptoms at rest  Current daily smoker  Echo pending  Reports significant family h/o CAD/MI/CABG  Planning for [facility] in setting of NSTEMI    Past Medical History:   Diagnosis Date    Anxiety     COPD (chronic obstructive pulmonary disease)     Diverticular disease     Hypertension        Past Surgical History:   Procedure Laterality Date    COLOSTOMY CLOSURE      HERNIA REPAIR      LAPAROSCOPIC COLOSTOMY         Review of patient's allergies indicates:   Allergen Reactions    Amoxicillin Itching    Penicillins     Adhesive Rash       No current facility-administered medications on file prior to encounter.     Current Outpatient Medications on File Prior to Encounter   Medication Sig    albuterol (PROVENTIL/VENTOLIN HFA) 90 mcg/actuation inhaler  Inhale 2 puffs into the lungs every 6 (six) hours as needed for Wheezing. Rescue    ALPRAZolam (XANAX) 0.25 MG tablet Take 0.25 mg by mouth.    aspirin (ECOTRIN) 81 MG EC tablet Take 1 tablet by mouth once daily.    fluticasone-salmeterol diskus inhaler 250-50 mcg Inhale 1 puff into the lungs 2 (two) times a day.    lisinopriL-hydrochlorothiazide (PRINZIDE,ZESTORETIC) 20-12.5 mg per tablet Take 1 tablet by mouth once daily.    meclizine (ANTIVERT) 25 mg tablet Take 25 mg by mouth 2 (two) times daily as needed.    pantoprazole (PROTONIX) 20 MG tablet Take 20 mg by mouth every morning.    [DISCONTINUED] ibuprofen (ADVIL,MOTRIN) 800 MG tablet Take 800 mg by mouth every 12 (twelve) hours as needed.    [DISCONTINUED] mupirocin (BACTROBAN) 2 % ointment Apply topically 2 (two) times daily.    [DISCONTINUED] predniSONE (DELTASONE) 10 MG tablet Take 10 mg by mouth 2 (two) times daily.    atorvastatin (LIPITOR) 10 MG tablet Take 10 mg by mouth every evening.    [DISCONTINUED] alprazolam (XANAX) 0.5 MG tablet Take 1 tablet (0.5 mg total) by mouth 3 (three) times daily as needed for Anxiety.    [DISCONTINUED] aspirin (ECOTRIN) 81 MG EC tablet Take 81 mg by mouth once daily.    [DISCONTINUED] BUDESONIDE/FORMOTEROL FUMARATE (SYMBICORT INHL) Inhale into the lungs.    [DISCONTINUED] lisinopril-hydrochlorothiazide (PRINZIDE,ZESTORETIC) 20-12.5 mg per tablet Take 1 tablet by mouth once daily.    [DISCONTINUED] naproxen (NAPROSYN) 500 MG tablet Take 1 tablet (500 mg total) by mouth 2 (two) times daily with meals.     Family History       Problem Relation (Age of Onset)    COPD Mother, Father    Heart attack Mother, Father          Tobacco Use    Smoking status: Every Day     Current packs/day: 1.50     Average packs/day: 1.5 packs/day for 20.0 years (30.0 ttl pk-yrs)     Types: Cigarettes     Start date: 2/3/2004    Smokeless tobacco: Never   Substance and Sexual Activity    Alcohol use: No    Drug use: No    Sexual activity: Yes      Partners: Female     Review of Systems   Constitutional: Negative for diaphoresis, malaise/fatigue, weight gain and weight loss.   HENT:  Negative for congestion and nosebleeds.    Cardiovascular:  Negative for chest pain, claudication, cyanosis, dyspnea on exertion, irregular heartbeat, leg swelling, near-syncope, orthopnea, palpitations, paroxysmal nocturnal dyspnea and syncope.   Respiratory:  Positive for shortness of breath. Negative for cough, hemoptysis, sleep disturbances due to breathing, snoring, sputum production and wheezing.    Hematologic/Lymphatic: Negative for bleeding problem. Does not bruise/bleed easily.   Skin:  Negative for rash.   Musculoskeletal:  Positive for myalgias. Negative for arthritis, back pain, falls, joint pain, muscle cramps and muscle weakness.   Gastrointestinal:  Negative for abdominal pain, constipation, diarrhea, heartburn, hematemesis, hematochezia, melena, nausea and vomiting.   Genitourinary:  Negative for dysuria, hematuria and nocturia.   Neurological:  Negative for excessive daytime sleepiness, dizziness, headaches, light-headedness, loss of balance, numbness, vertigo and weakness.     Objective:     Vital Signs (Most Recent):  Temp: 98 °F (36.7 °C) (02/03/24 1004)  Pulse: 75 (02/03/24 1229)  Resp: (!) 28 (02/03/24 1229)  BP: (!) 138/96 (02/03/24 1229)  SpO2: (!) 94 % (02/03/24 1229) Vital Signs (24h Range):  Temp:  [98 °F (36.7 °C)-98.2 °F (36.8 °C)] 98 °F (36.7 °C)  Pulse:  [66-91] 75  Resp:  [19-29] 28  SpO2:  [92 %-97 %] 94 %  BP: (117-169)/(64-96) 138/96     Weight: 97.1 kg (214 lb)  Body mass index is 32.54 kg/m².    SpO2: (!) 94 %         Intake/Output Summary (Last 24 hours) at 2/3/2024 1235  Last data filed at 2/3/2024 0800  Gross per 24 hour   Intake --   Output 350 ml   Net -350 ml       Lines/Drains/Airways       Peripheral Intravenous Line  Duration                  Peripheral IV - Single Lumen 02/03/24 0107 20 G Right Antecubital <1 day         Peripheral  IV - Single Lumen 02/03/24 1227 20 G Right Hand <1 day         Peripheral IV - Single Lumen 02/03/24 20 G Left Antecubital <1 day                     Physical Exam  Vitals and nursing note reviewed.   Constitutional:       Appearance: Normal appearance. He is well-developed.   HENT:      Head: Normocephalic.      Mouth/Throat:      Mouth: Mucous membranes are moist.   Neck:      Vascular: No carotid bruit or JVD.   Cardiovascular:      Rate and Rhythm: Normal rate and regular rhythm.      Pulses: Normal pulses.      Heart sounds: Normal heart sounds. No murmur heard.     No friction rub.   Pulmonary:      Effort: Pulmonary effort is normal. No respiratory distress.      Breath sounds: Normal breath sounds. No wheezing or rales.   Abdominal:      General: Bowel sounds are normal. There is no distension.      Palpations: Abdomen is soft.      Tenderness: There is no abdominal tenderness. There is no guarding.   Musculoskeletal:         General: No swelling or tenderness.      Cervical back: Neck supple. No tenderness.      Right lower leg: No edema.      Left lower leg: No edema.   Skin:     General: Skin is warm and dry.      Capillary Refill: Capillary refill takes less than 2 seconds.      Findings: No rash.   Neurological:      General: No focal deficit present.      Mental Status: He is alert and oriented to person, place, and time.   Psychiatric:         Mood and Affect: Mood normal.         Behavior: Behavior normal.         Thought Content: Thought content normal.          Significant Labs: BMP:   Recent Labs   Lab 02/03/24  0119   *      K 4.1      CO2 26   BUN 16   CREATININE 1.4   CALCIUM 9.4   , CMP   Recent Labs   Lab 02/03/24  0119      K 4.1      CO2 26   *   BUN 16   CREATININE 1.4   CALCIUM 9.4   PROT 7.3   ALBUMIN 4.0   BILITOT 0.3   ALKPHOS 72   AST 29   ALT 33   ANIONGAP 11   , CBC   Recent Labs   Lab 02/03/24  0053 02/03/24  0502   WBC 10.11 11.20   HGB 16.4  14.6   HCT 49.2 43.6   * 279   , INR   Recent Labs   Lab 02/03/24  0119   INR 0.9   , Lipid Panel   Recent Labs   Lab 02/03/24  0502   CHOL 198   HDL 30*   LDLCALC 126.0   TRIG 210*   CHOLHDL 15.2*   , and Troponin   Recent Labs   Lab 02/03/24  0603 02/03/24  0837 02/03/24  1130   TROPONINI 2.878* 6.206* 8.952*       Significant Imaging: Echocardiogram: 2D echo with color flow doppler: No results found for this or any previous visit. and Transthoracic echo (TTE) complete (Cupid Only):   Results for orders placed or performed during the hospital encounter of 02/03/24   Echo   Result Value Ref Range    BSA 2.16 m2    LVOT stroke volume 65.94 cm3    LVIDd 4.59 3.5 - 6.0 cm    LV Systolic Volume 40.06 mL    LV Systolic Volume Index 19.1 mL/m2    LVIDs 3.17 2.1 - 4.0 cm    LV Diastolic Volume 96.81 mL    LV Diastolic Volume Index 46.10 mL/m2    IVS 1.35 (A) 0.6 - 1.1 cm    LVOT diameter 2.00 cm    LVOT area 3.1 cm2    FS 31 28 - 44 %    Left Ventricle Relative Wall Thickness 0.56 cm    Posterior Wall 1.28 (A) 0.6 - 1.1 cm    LV mass 233.28 g    LV Mass Index 111 g/m2    MV Peak E Pedro 0.55 m/s    TDI LATERAL 0.09 m/s    TDI SEPTAL 0.11 m/s    E/E' ratio 5.50 m/s    MV Peak A Pedro 0.73 m/s    TR Max Pedro 1.66 m/s    E/A ratio 0.75     IVRT 76.12 msec    E wave deceleration time 165.86 msec    LV SEPTAL E/E' RATIO 5.00 m/s    LV LATERAL E/E' RATIO 6.11 m/s    LVOT peak pedro 1.07 m/s    Left Ventricular Outflow Tract Mean Velocity 0.94 cm/s    Left Ventricular Outflow Tract Mean Gradient 3.55 mmHg    RVOT peak VTI 13.3 cm    TAPSE 1.82 cm    LA size 3.10 cm    Left Atrium Minor Axis 4.90 cm    Left Atrium Major Axis 4.80 cm    RA Major Axis 3.52 cm    AV mean gradient 5 mmHg    AV peak gradient 8 mmHg    Ao peak pedro 1.44 m/s    Ao VTI 30.20 cm    LVOT peak VTI 21.00 cm    AV valve area 2.18 cm²    AV Velocity Ratio 0.74     AV index (prosthetic) 0.70     FADUMO by Velocity Ratio 2.33 cm²    Mr max pedro 2.94 m/s    MV  stenosis pressure 1/2 time 48.10 ms    MV valve area p 1/2 method 4.57 cm2    Triscuspid Valve Regurgitation Peak Gradient 11 mmHg    PV mean gradient 1 mmHg    PV peak gradient 1     RVOT peak lu 0.59 m/s    Ao root annulus 3.21 cm    STJ 3.15 cm    Ascending aorta 3.06 cm    IVC diameter 1.81 cm    Mean e' 0.10 m/s    ZLVIDS -1.82     ZLVIDD -3.52     LA Volume Index 17.6 mL/m2    LA volume 37.06 cm3    LA WIDTH 2.9 cm    RA Width 2.8 cm     Assessment and Plan:     * NSTEMI (non-ST elevated myocardial infarction)  Will have [facility]  Echo pending  Continue heparin infusion  Start maintenance fluids  Continue ASA  Start statin and BB    HTN (hypertension)  Elevated  Recommend starting ACEi/ARB    Tobacco abuse  Smoking cessation encouraged.        VTE Risk Mitigation (From admission, onward)           Ordered     IP VTE HIGH RISK PATIENT  Once         02/03/24 0535     Place sequential compression device  Until discontinued         02/03/24 0535     heparin 25,000 units in dextrose 5% (100 units/ml) IV bolus from bag - ADDITIONAL PRN BOLUS - 60 units/kg (max bolus 4000 units)  As needed (PRN)        Question:  Heparin Infusion Adjustment (DO NOT MODIFY ANSWER)  Answer:  \\[email or web address]\Images\Pharmacy\HeparinInfusions\heparin LOW INTENSITY nomogram for [facility] [id number]    02/03/24 0045     heparin 25,000 units in dextrose 5% (100 units/ml) IV bolus from bag - ADDITIONAL PRN BOLUS - 30 units/kg (max bolus 4000 units)  As needed (PRN)        Question:  Heparin Infusion Adjustment (DO NOT MODIFY ANSWER)  Answer:  \\[email or web address]\Images\Pharmacy\HeparinInfusions\heparin LOW INTENSITY nomogram for [facility] [id number].pdf    02/03/24 0045     heparin 25,000 units in dextrose 5% 250 mL (100 units/mL) infusion LOW INTENSITY nomogram - [facility]  Continuous        Question:  Begin at (units/kg/hr)  Answer:  12    02/03/24 0045                    Thank you for your consult. I will follow-up with patient. Please contact us if you have any  additional questions.    Wilmar Alfaro MD  Cardiology   [facility] - Emergency Dept.

## 2024-02-03 NOTE — Clinical Note
290 ml of contrast were injected throughout the case. 10 mL of contrast was the total wasted during the case. 300 mL was the total amount used during the case.

## 2024-02-03 NOTE — HPI
69 y.o. male with a PMH has a past medical history of Anxiety, COPD, HTN (white coat syndrome), tobacco abuse who comes in with bilateral arm pain and chest discomfort that occurred the day PTA. Reports lifting groceries and started having arm pain that became intermittent, had some chest discomfort and SOB. He went to sleep but symptoms did not improve, took 2 ASA then went to ED. Initial troponin 0.48 and trended up to 6.2.  Placed on heparin infusion  Given nitropaste, morphine and metoprolol   EKG with NSR and inferior infarct  Currently without symptoms at rest  Current daily smoker  Echo pending  Reports significant family h/o CAD/MI/CABG  Planning for [facility] in setting of NSTEMI

## 2024-02-03 NOTE — ASSESSMENT & PLAN NOTE
Patient reports smoking approximately 1.5 pack of cigarettes daily with no thoughts of cutting back currently. Patient educated on morbidity and mortality in regards to continuation of smoking and stressed importance of cessation. Patient currently declined nicotine patch at time of admission but will be available at patient's request.  Plan:  -Nicotine patch available at patient's request

## 2024-02-03 NOTE — PLAN OF CARE
L TR Band removed; pressure dressing placed.  Dressing remains c/d/I and site wnl at time of transfer.  Transferred to room 242, via hospital bed, in no apparent distress.  Report given to nurse at bedside; no further questions at this time.  Family at bedside at time of transfer.

## 2024-02-03 NOTE — H&P
[facility] - Emergency Dept.  [facility] Medicine  History & Physical    Patient Name: Eber Wilson  MRN: [id number]  Patient Class: IP- Inpatient  Admission Date: 2/3/2024  Attending Physician: Gabi Arvizu MD   Primary Care Provider: Michael Lopez FNP-C         Patient information was obtained from patient, past medical records, and ER records.     Subjective:     Principal Problem:NSTEMI (non-ST elevated myocardial infarction)    Chief Complaint:   Chief Complaint   Patient presents with    Chest Pain     Brought in by [facility], c/o chest pain generalized across entire chest and a heaviness in bilat arms, onset aprox 30 minutes before calling for ambulance         HPI: Eber Wilson is a 69 y.o. male with a PMH  has a past medical history of Anxiety, COPD (chronic obstructive pulmonary disease), Diverticular disease, and Hypertension.  Presents as a transfer from our [facility] facility for NSTEMI and Cardiology consult.  Patient reports he was taking groceries out in his vehicle around 1900 last night when he started having chest discomfort in bilateral arm pain.  Symptoms lasted for a few moments and resolved on its own after he laid down.  Around 2200 patient experienced the same pain while lying down getting ready for bed.  Again, symptoms lasted momentarily resolved after taking 2 baby aspirin.  Patient denies personal history of heart disease, but states that his brother in both his parents suffered from cardiac issues including MI and open heart surgery.  Patient currently asymptomatic and denies any other associated symptoms.    ER workup revealed CBG of 177 mg/dL, initial troponin of 0.4 a 2 with delta troponin of 0.932.  Chest x-ray with no acute findings.  Initial EKG showed sinus rhythm with PACs with a ventricular rate of 90 beats per minute and a QT/QTC of 364/445.  Repeat EKG roughly 1 hour later showed sinus rhythm with marked sinus arrhythmia and left axis deviation with ventricular rate  of 86 beats per minute and a QT/QTC of 360/430.  On-call cardiologist consulted (Dr. Alfaro).  Patient initiated on heparin drip. Patient also received 1 g of Tylenol, 162 mg aspirin, 1 mg of Ativan, 50 mg metoprolol 2 mg of morphine, and 1 in of nitroglycerin paste. Hospital Medicine consulted to admit patient for NSTEMI and Cardiology evaluation.  Patient was transferred from ED to ED due to no beds available on floor.  Patient admitted under inpatient status.    PCP: Michael Lopez      Past Medical History:   Diagnosis Date    Anxiety     COPD (chronic obstructive pulmonary disease)     Diverticular disease     Hypertension        Past Surgical History:   Procedure Laterality Date    COLOSTOMY CLOSURE      HERNIA REPAIR      LAPAROSCOPIC COLOSTOMY         Review of patient's allergies indicates:   Allergen Reactions    Amoxicillin Itching    Penicillins     Adhesive Rash       No current facility-administered medications on file prior to encounter.     Current Outpatient Medications on File Prior to Encounter   Medication Sig    ADVAIR DISKUS 250-50 mcg/dose diskus inhaler 1 PUFF TWICE A DAY    albuterol (PROVENTIL/VENTOLIN HFA) 90 mcg/actuation inhaler Inhale 2 puffs into the lungs every 6 (six) hours as needed for Wheezing. Rescue    ALPRAZolam (XANAX) 0.25 MG tablet Take 0.25 mg by mouth.    aspirin (ECOTRIN) 81 MG EC tablet Take 1 tablet by mouth once daily.    atorvastatin (LIPITOR) 10 MG tablet Take 10 mg by mouth every evening.    ibuprofen (ADVIL,MOTRIN) 800 MG tablet Take 800 mg by mouth every 12 (twelve) hours as needed.    mupirocin (BACTROBAN) 2 % ointment Apply topically 2 (two) times daily.    pantoprazole (PROTONIX) 20 MG tablet Take 20 mg by mouth every morning.    predniSONE (DELTASONE) 10 MG tablet Take 10 mg by mouth 2 (two) times daily.    [DISCONTINUED] alprazolam (XANAX) 0.5 MG tablet Take 1 tablet (0.5 mg total) by mouth 3 (three) times daily as needed for Anxiety.    [DISCONTINUED]  aspirin (ECOTRIN) 81 MG EC tablet Take 81 mg by mouth once daily.    [DISCONTINUED] BUDESONIDE/FORMOTEROL FUMARATE (SYMBICORT INHL) Inhale into the lungs.    [DISCONTINUED] lisinopril-hydrochlorothiazide (PRINZIDE,ZESTORETIC) 20-12.5 mg per tablet Take 1 tablet by mouth once daily.    [DISCONTINUED] naproxen (NAPROSYN) 500 MG tablet Take 1 tablet (500 mg total) by mouth 2 (two) times daily with meals.     Family History       Problem Relation (Age of Onset)    COPD Mother, Father    Heart attack Mother, Father          Tobacco Use    Smoking status: Every Day     Current packs/day: 1.50     Average packs/day: 1.5 packs/day for 20.0 years (30.0 ttl pk-yrs)     Types: Cigarettes     Start date: 2/3/2004    Smokeless tobacco: Never   Substance and Sexual Activity    Alcohol use: No    Drug use: No    Sexual activity: Yes     Partners: Female     Review of Systems   Respiratory:  Negative for cough, shortness of breath and wheezing.    Cardiovascular:  Positive for chest pain. Negative for palpitations and leg swelling.   All other systems reviewed and are negative.    Objective:     Vital Signs (Most Recent):  Temp: 98.2 °F (36.8 °C) (02/03/24 0029)  Pulse: 76 (02/03/24 0600)  Resp: (!) 22 (02/03/24 0600)  BP: 135/81 (02/03/24 0600)  SpO2: 95 % (02/03/24 0600) Vital Signs (24h Range):  Temp:  [98.2 °F (36.8 °C)] 98.2 °F (36.8 °C)  Pulse:  [66-91] 76  Resp:  [19-29] 22  SpO2:  [92 %-97 %] 95 %  BP: (117-169)/(71-95) 135/81     Weight: 97.5 kg (215 lb)  Body mass index is 32.69 kg/m².     Physical Exam  Vitals and nursing note reviewed.   Constitutional:       General: He is awake. He is not in acute distress.     Appearance: Normal appearance. He is well-developed and well-groomed. He is not ill-appearing, toxic-appearing or diaphoretic.   HENT:      Head: Normocephalic and atraumatic.   Eyes:      Extraocular Movements: Extraocular movements intact.      Conjunctiva/sclera: Conjunctivae normal.   Cardiovascular:       Rate and Rhythm: Normal rate and regular rhythm.      Pulses: Normal pulses.      Heart sounds: Normal heart sounds. No murmur heard.  Pulmonary:      Effort: Pulmonary effort is normal.      Breath sounds: Normal breath sounds.   Abdominal:      General: Bowel sounds are normal.      Palpations: Abdomen is soft.      Tenderness: There is no abdominal tenderness.   Musculoskeletal:      Cervical back: Normal range of motion and neck supple.      Comments: 5/5 strength throughout   Skin:     General: Skin is warm and dry.      Capillary Refill: Capillary refill takes less than 2 seconds.   Neurological:      General: No focal deficit present.      Mental Status: He is alert and oriented to person, place, and time. Mental status is at baseline.      GCS: GCS eye subscore is 4. GCS verbal subscore is 5. GCS motor subscore is 6.      Cranial Nerves: Cranial nerves 2-12 are intact.      Motor: Motor function is intact.   Psychiatric:         Mood and Affect: Mood normal.         Behavior: Behavior normal. Behavior is cooperative.        LABS:  Recent Results (from the past 24 hour(s))   CBC auto differential    Collection Time: 02/03/24 12:53 AM   Result Value Ref Range    WBC 10.11 3.90 - 12.70 K/uL    RBC 5.40 4.60 - 6.20 M/uL    Hemoglobin 16.4 14.0 - 18.0 g/dL    Hematocrit 49.2 40.0 - 54.0 %    MCV 91 82 - 98 fL    MCH 30.4 27.0 - 31.0 pg    MCHC 33.3 32.0 - 36.0 g/dL    RDW 13.2 11.5 - 14.5 %    Platelets 141 (L) 150 - 450 K/uL    MPV 10.8 9.2 - 12.9 fL    Immature Granulocytes 0.3 0.0 - 0.5 %    Gran # (ANC) 6.6 1.8 - 7.7 K/uL    Immature Grans (Abs) 0.03 0.00 - 0.04 K/uL    Lymph # 2.4 1.0 - 4.8 K/uL    Mono # 0.8 0.3 - 1.0 K/uL    Eos # 0.1 0.0 - 0.5 K/uL    Baso # 0.12 0.00 - 0.20 K/uL    nRBC 0 0 /100 WBC    Gran % 65.2 38.0 - 73.0 %    Lymph % 24.1 18.0 - 48.0 %    Mono % 8.0 4.0 - 15.0 %    Eosinophil % 1.2 0.0 - 8.0 %    Basophil % 1.2 0.0 - 1.9 %    Differential Method Automated    Comprehensive metabolic  panel    Collection Time: 02/03/24  1:19 AM   Result Value Ref Range    Sodium 141 136 - 145 mmol/L    Potassium 4.1 3.5 - 5.1 mmol/L    Chloride 104 95 - 110 mmol/L    CO2 26 23 - 29 mmol/L    Glucose 177 (H) 70 - 110 mg/dL    BUN 16 8 - 23 mg/dL    Creatinine 1.4 0.5 - 1.4 mg/dL    Calcium 9.4 8.7 - 10.5 mg/dL    Total Protein 7.3 6.0 - 8.4 g/dL    Albumin 4.0 3.5 - 5.2 g/dL    Total Bilirubin 0.3 0.1 - 1.0 mg/dL    Alkaline Phosphatase 72 55 - 135 U/L    AST 29 10 - 40 U/L    ALT 33 10 - 44 U/L    eGFR 54.4 (A) >60 mL/min/1.73 m^2    Anion Gap 11 8 - 16 mmol/L   Brain natriuretic peptide    Collection Time: 02/03/24  1:19 AM   Result Value Ref Range    BNP 19 0 - 99 pg/mL   Troponin I    Collection Time: 02/03/24  1:19 AM   Result Value Ref Range    Troponin I 0.482 (H) 0.000 - 0.026 ng/mL   D dimer, quantitative    Collection Time: 02/03/24  1:19 AM   Result Value Ref Range    D-Dimer 0.30 <0.50 mg/L FEU   Protime-INR    Collection Time: 02/03/24  1:19 AM   Result Value Ref Range    Prothrombin Time 10.6 9.0 - 12.5 sec    INR 0.9 0.8 - 1.2   APTT    Collection Time: 02/03/24  1:19 AM   Result Value Ref Range    aPTT 27.6 21.0 - 32.0 sec   Troponin I #2    Collection Time: 02/03/24  3:32 AM   Result Value Ref Range    Troponin I 0.932 (H) 0.000 - 0.026 ng/mL   CBC auto differential    Collection Time: 02/03/24  5:02 AM   Result Value Ref Range    WBC 11.20 3.90 - 12.70 K/uL    RBC 4.83 4.60 - 6.20 M/uL    Hemoglobin 14.6 14.0 - 18.0 g/dL    Hematocrit 43.6 40.0 - 54.0 %    MCV 90 82 - 98 fL    MCH 30.2 27.0 - 31.0 pg    MCHC 33.5 32.0 - 36.0 g/dL    RDW 12.8 11.5 - 14.5 %    Platelets 279 150 - 450 K/uL    MPV 9.6 9.2 - 12.9 fL    Immature Granulocytes 0.3 0.0 - 0.5 %    Gran # (ANC) 6.7 1.8 - 7.7 K/uL    Immature Grans (Abs) 0.03 0.00 - 0.04 K/uL    Lymph # 3.4 1.0 - 4.8 K/uL    Mono # 0.8 0.3 - 1.0 K/uL    Eos # 0.2 0.0 - 0.5 K/uL    Baso # 0.11 0.00 - 0.20 K/uL    nRBC 0 0 /100 WBC    Gran % 59.7 38.0 - 73.0 %     Lymph % 30.0 18.0 - 48.0 %    Mono % 7.2 4.0 - 15.0 %    Eosinophil % 1.8 0.0 - 8.0 %    Basophil % 1.0 0.0 - 1.9 %    Platelet Estimate Appears normal     Differential Method Automated    APTT    Collection Time: 02/03/24  6:03 AM   Result Value Ref Range    aPTT 44.6 (H) 21.0 - 32.0 sec   Troponin I    Collection Time: 02/03/24  6:03 AM   Result Value Ref Range    Troponin I 2.878 (H) 0.000 - 0.026 ng/mL   Type & Screen    Collection Time: 02/03/24  6:03 AM   Result Value Ref Range    Group & Rh A POS     Indirect Alice NEG     Specimen Outdate 02/06/2024 23:59        RADIOLOGY  X-Ray Chest AP Portable    Result Date: 2/3/2024  EXAMINATION: XR CHEST AP PORTABLE CLINICAL HISTORY: Shortness of breath TECHNIQUE: Single frontal view of the chest was performed. COMPARISON: 01/21/2020 FINDINGS: Lungs are clear. No focal consolidation. No pleural effusion. No pneumothorax. Normal heart size.     No acute findings. Electronically signed by: Rupesh Henderson Date:    02/03/2024 Time:    01:47      EKG    MICROBIOLOGY    MDM     Amount and/or Complexity of Data Reviewed  Clinical lab tests: reviewed  Tests in the radiology section of CPT®: reviewed  Tests in the medicine section of CPT®: reviewed  Discussion of test results with the performing providers: yes  Decide to obtain previous medical records or to obtain history from someone other than the patient: yes  Obtain history from someone other than the patient: yes  Review and summarize past medical records: yes  Discuss the patient with other providers: yes  Independent visualization of images, tracings, or specimens: yes        Assessment/Plan:     * NSTEMI (non-ST elevated myocardial infarction)  Patient presents with NSTEMI. Chest pain is currently controlled. LALA score is 4. Patient is currently on NSTEMI Pathway.    EKG reviewed. Troponins reviewed and results noted-   Recent Labs   Lab 02/03/24  0603   TROPONINI 2.878*   .     Lipid panel reviewed and  "shows-     No results found for: "LDLCALC"  No results found for: "TRIG"      Medical management includes; Beta Blocker, Anticoagulation, and Nitrate Echo has not been performed. Latest ECHO results are as follows- No results found for this or any previous visit.  .   Consult for cardiac rehab is ordered. Patient counseled on lifestyle modifications- quit smoking, follow a low fat, low cholesterol diet, attempt to lose weight, reduce salt in diet and cooking, reduce exposure to stress, improve dietary compliance, continue current medications, and return for routine annual checkups. Cardiology is consulted. Plan of care reviewed with cardiology team. Continue to monitor patient closely and adjust therapy as needed.        Elevated troponin  See above NSTEMI      HTN (hypertension)  Chronic, controlled. Latest blood pressure and vitals reviewed-     Temp:  [98.2 °F (36.8 °C)]   Pulse:  [66-91]   Resp:  [19-29]   BP: (117-169)/(71-95)   SpO2:  [92 %-97 %] .   Home meds for hypertension were reviewed and noted below.   Hypertension Medications             While in the hospital, will manage blood pressure as follows; Continue home antihypertensive regimen    Will utilize p.r.n. blood pressure medication only if patient's blood pressure greater than 160/100 and he develops symptoms such as worsening chest pain or shortness of breath.    COPD exacerbation  Patient's COPD is controlled currently.  Patient is currently off COPD Pathway. Continue scheduled inhalers  duonebs prn, Steroids, and Supplemental oxygen and monitor respiratory status closely.     Anxiety  Chronic. Stable. Not in acute exacerbation and currently denies endorsing any suicidal/homicidal ideations.   Plan:  -Continue home medications (xanax)        Tobacco abuse  Patient reports smoking approximately 1.5 pack of cigarettes daily with no thoughts of cutting back currently. Patient educated on morbidity and mortality in regards to continuation of smoking " and stressed importance of cessation. Patient currently declined nicotine patch at time of admission but will be available at patient's request.  Plan:  -Nicotine patch available at patient's request            VTE Risk Mitigation (From admission, onward)           Ordered     IP VTE HIGH RISK PATIENT  Once         02/03/24 0535     Place sequential compression device  Until discontinued         02/03/24 0535     heparin 25,000 units in dextrose 5% (100 units/ml) IV bolus from bag - ADDITIONAL PRN BOLUS - 60 units/kg (max bolus 4000 units)  As needed (PRN)        Question:  Heparin Infusion Adjustment (DO NOT MODIFY ANSWER)  Answer:  [email or web address]\epic\Images\Pharmacy\HeparinInfusions\heparin LOW INTENSITY nomogram for [facility] [id number].pdf    02/03/24 0045     heparin 25,000 units in dextrose 5% (100 units/ml) IV bolus from bag - ADDITIONAL PRN BOLUS - 30 units/kg (max bolus 4000 units)  As needed (PRN)        Question:  Heparin Infusion Adjustment (DO NOT MODIFY ANSWER)  Answer:  \\[email or web address]\epic\Images\Pharmacy\HeparinInfusions\heparin LOW INTENSITY nomogram for [facility] [id number].pdf    02/03/24 0045     heparin 25,000 units in dextrose 5% 250 mL (100 units/mL) infusion LOW INTENSITY nomogram - [facility]  Continuous        Question:  Begin at (units/kg/hr)  Answer:  12    02/03/24 0045                     //Core Measures   -DVT proph: SCDs, heparin drip  -Code status Full    -Surrogate:brother      Components of this note were documented using a voice recognition system and are subject to errors not corrected at the time the document was proof read. Please contact the author for any clarifications.          Jayjay Cee NP  Department of Hospital Medicine  [facility] - Emergency Dept.

## 2024-02-03 NOTE — ASSESSMENT & PLAN NOTE
Chronic, controlled. Latest blood pressure and vitals reviewed-     Temp:  [98.2 °F (36.8 °C)]   Pulse:  [66-91]   Resp:  [19-29]   BP: (117-169)/(71-95)   SpO2:  [92 %-97 %] .   Home meds for hypertension were reviewed and noted below.   Hypertension Medications             While in the hospital, will manage blood pressure as follows; Continue home antihypertensive regimen    Will utilize p.r.n. blood pressure medication only if patient's blood pressure greater than 160/100 and he develops symptoms such as worsening chest pain or shortness of breath.

## 2024-02-03 NOTE — DISCHARGE INSTRUCTIONS
"Post-op Heart Catheterization    1. DIET: It is advisable for you to follow a diet that limits the intake of salt, sugar, saturated fats and cholesterol.     2. DRIVING: Due to sedation you received during your procedure, DO NOT drive or operate machinery for 24 hours. Avoid making critical decisions or signing legal documents until tomorrow.    3. ACTIVITY: AVOID activities that require bending of the affected arm/wrist for 3 days and submerging the site in water for 3 days.   REMOVE the dressing the day after the procedure, and shower.  Wash with soap and water in hand; do not use wash cloth.  Apply a bandaid to site after shower.  No ointments, lotions, powders, colognes, ... for 5 days.  WEAR wrist immobilizer until Sunday night at bedtime.  No pushing, pulling, or lifting more than 10 pounds for 3 days  No riding motorcycles, riding lawn mowers, using push mowers or weed eaters... for 5 days.  You may RESUME your normal activities or prescribed exercise program as instructed by your physician after 5 days.                                                                                                       4. WOUND CARE: It is not unusual to have a small amount of bruising to appear at or near the puncture site. It is also common to have a tender "knot" develop beneath the skin at the puncture site of the wrist/arm. This is usually scar tissue and is not a cause for concern or alarm. This tender knot may take several weeks to fully resolve. The bruise will usually spread over several days. However, if the lump gets bigger, call your doctor immediately.    5. DISCOMFORT: For general discomfort at the puncture site, you may take 1 or 2 Acetaminophen (Tylenol) tablets every 4 - 6 hours as needed. (Do not take more than 4000 mg a day)    6. SIGNS AND SYMPTOMS TO REPORT:  Call your physician immediately if any of the following occur:                                            1. Loss of feeling, warmth or color to " "the affected arm/wrist                                                                                                            2. Mild bleeding from the site                 3. Pain that is sudden, sharp or persistent in the affected arm/wrist                 4. Swelling or a change in "lump" size, increased redness or drainage at the puncture site                                                                               5. High fever (101 degrees or higher)    7. GO TO  THE EMERGENCY ROOM OR CALL 911 IF YOU HAVE: Chest pains or discomforts not relieved with 3 nitroglycerin doses (sublingual tablets or spray), numbness or severe pain of limb, if your limb becomes cold or discolored or if you develop uncontrolled bleeding from the puncture site (quickly apply firm, direct pressure above the site).  "

## 2024-02-03 NOTE — PHARMACY MED REC
"Admission Medication History     The home medication history was taken by Alfred Segura.    You may go to "Admission" then "Reconcile Home Medications" tabs to review and/or act upon these items.     The home medication list has been updated by the Pharmacy department.   Please read ALL comments highlighted in yellow.   Please address this information as you see fit.    Feel free to contact us if you have any questions or require assistance.      The medications listed below were removed from the home medication list. Please reorder if appropriate:  Patient reports no longer taking the following medication(s):  IBUPROFEN 800MG  BACTROBAN OINTMENT  PREDNISONE 10MG    Medications listed below were obtained from: Patient/family and Analytic software- [employer]  (Not in a hospital admission)        Alfred Segura  [id number]    Current Outpatient Medications on File Prior to Encounter   Medication Sig Dispense Refill Last Dose    albuterol (PROVENTIL/VENTOLIN HFA) 90 mcg/actuation inhaler Inhale 2 puffs into the lungs every 6 (six) hours as needed for Wheezing. Rescue       ALPRAZolam (XANAX) 0.25 MG tablet Take 0.25 mg by mouth.   2/2/2024    aspirin (ECOTRIN) 81 MG EC tablet Take 1 tablet by mouth once daily.   2/2/2024    fluticasone-salmeterol diskus inhaler 250-50 mcg Inhale 1 puff into the lungs 2 (two) times a day.   2/2/2024    lisinopriL-hydrochlorothiazide (PRINZIDE,ZESTORETIC) 20-12.5 mg per tablet Take 1 tablet by mouth once daily.   2/2/2024    meclizine (ANTIVERT) 25 mg tablet Take 25 mg by mouth 2 (two) times daily as needed.   2/2/2024    pantoprazole (PROTONIX) 20 MG tablet Take 20 mg by mouth every morning.   2/2/2024    atorvastatin (LIPITOR) 10 MG tablet Take 10 mg by mouth every evening.   More than a month   .          "

## 2024-02-03 NOTE — SUBJECTIVE & OBJECTIVE
Past Medical History:   Diagnosis Date    Anxiety     COPD (chronic obstructive pulmonary disease)     Diverticular disease     Hypertension        Past Surgical History:   Procedure Laterality Date    COLOSTOMY CLOSURE      HERNIA REPAIR      LAPAROSCOPIC COLOSTOMY         Review of patient's allergies indicates:   Allergen Reactions    Amoxicillin Itching    Penicillins     Adhesive Rash       No current facility-administered medications on file prior to encounter.     Current Outpatient Medications on File Prior to Encounter   Medication Sig    ADVAIR DISKUS 250-50 mcg/dose diskus inhaler 1 PUFF TWICE A DAY    albuterol (PROVENTIL/VENTOLIN HFA) 90 mcg/actuation inhaler Inhale 2 puffs into the lungs every 6 (six) hours as needed for Wheezing. Rescue    ALPRAZolam (XANAX) 0.25 MG tablet Take 0.25 mg by mouth.    aspirin (ECOTRIN) 81 MG EC tablet Take 1 tablet by mouth once daily.    atorvastatin (LIPITOR) 10 MG tablet Take 10 mg by mouth every evening.    ibuprofen (ADVIL,MOTRIN) 800 MG tablet Take 800 mg by mouth every 12 (twelve) hours as needed.    mupirocin (BACTROBAN) 2 % ointment Apply topically 2 (two) times daily.    pantoprazole (PROTONIX) 20 MG tablet Take 20 mg by mouth every morning.    predniSONE (DELTASONE) 10 MG tablet Take 10 mg by mouth 2 (two) times daily.    [DISCONTINUED] alprazolam (XANAX) 0.5 MG tablet Take 1 tablet (0.5 mg total) by mouth 3 (three) times daily as needed for Anxiety.    [DISCONTINUED] aspirin (ECOTRIN) 81 MG EC tablet Take 81 mg by mouth once daily.    [DISCONTINUED] BUDESONIDE/FORMOTEROL FUMARATE (SYMBICORT INHL) Inhale into the lungs.    [DISCONTINUED] lisinopril-hydrochlorothiazide (PRINZIDE,ZESTORETIC) 20-12.5 mg per tablet Take 1 tablet by mouth once daily.    [DISCONTINUED] naproxen (NAPROSYN) 500 MG tablet Take 1 tablet (500 mg total) by mouth 2 (two) times daily with meals.     Family History       Problem Relation (Age of Onset)    COPD Mother, Father    Heart attack  Mother, Father          Tobacco Use    Smoking status: Every Day     Current packs/day: 1.50     Average packs/day: 1.5 packs/day for 20.0 years (30.0 ttl pk-yrs)     Types: Cigarettes     Start date: 2/3/2004    Smokeless tobacco: Never   Substance and Sexual Activity    Alcohol use: No    Drug use: No    Sexual activity: Yes     Partners: Female     Review of Systems   Respiratory:  Negative for cough, shortness of breath and wheezing.    Cardiovascular:  Positive for chest pain. Negative for palpitations and leg swelling.   All other systems reviewed and are negative.    Objective:     Vital Signs (Most Recent):  Temp: 98.2 °F (36.8 °C) (02/03/24 0029)  Pulse: 76 (02/03/24 0600)  Resp: (!) 22 (02/03/24 0600)  BP: 135/81 (02/03/24 0600)  SpO2: 95 % (02/03/24 0600) Vital Signs (24h Range):  Temp:  [98.2 °F (36.8 °C)] 98.2 °F (36.8 °C)  Pulse:  [66-91] 76  Resp:  [19-29] 22  SpO2:  [92 %-97 %] 95 %  BP: (117-169)/(71-95) 135/81     Weight: 97.5 kg (215 lb)  Body mass index is 32.69 kg/m².     Physical Exam  Vitals and nursing note reviewed.   Constitutional:       General: He is awake. He is not in acute distress.     Appearance: Normal appearance. He is well-developed and well-groomed. He is not ill-appearing, toxic-appearing or diaphoretic.   HENT:      Head: Normocephalic and atraumatic.   Eyes:      Extraocular Movements: Extraocular movements intact.      Conjunctiva/sclera: Conjunctivae normal.   Cardiovascular:      Rate and Rhythm: Normal rate and regular rhythm.      Pulses: Normal pulses.      Heart sounds: Normal heart sounds. No murmur heard.  Pulmonary:      Effort: Pulmonary effort is normal.      Breath sounds: Normal breath sounds.   Abdominal:      General: Bowel sounds are normal.      Palpations: Abdomen is soft.      Tenderness: There is no abdominal tenderness.   Musculoskeletal:      Cervical back: Normal range of motion and neck supple.      Comments: 5/5 strength throughout   Skin:      General: Skin is warm and dry.      Capillary Refill: Capillary refill takes less than 2 seconds.   Neurological:      General: No focal deficit present.      Mental Status: He is alert and oriented to person, place, and time. Mental status is at baseline.      GCS: GCS eye subscore is 4. GCS verbal subscore is 5. GCS motor subscore is 6.      Cranial Nerves: Cranial nerves 2-12 are intact.      Motor: Motor function is intact.   Psychiatric:         Mood and Affect: Mood normal.         Behavior: Behavior normal. Behavior is cooperative.        LABS:  Recent Results (from the past 24 hour(s))   CBC auto differential    Collection Time: 02/03/24 12:53 AM   Result Value Ref Range    WBC 10.11 3.90 - 12.70 K/uL    RBC 5.40 4.60 - 6.20 M/uL    Hemoglobin 16.4 14.0 - 18.0 g/dL    Hematocrit 49.2 40.0 - 54.0 %    MCV 91 82 - 98 fL    MCH 30.4 27.0 - 31.0 pg    MCHC 33.3 32.0 - 36.0 g/dL    RDW 13.2 11.5 - 14.5 %    Platelets 141 (L) 150 - 450 K/uL    MPV 10.8 9.2 - 12.9 fL    Immature Granulocytes 0.3 0.0 - 0.5 %    Gran # (ANC) 6.6 1.8 - 7.7 K/uL    Immature Grans (Abs) 0.03 0.00 - 0.04 K/uL    Lymph # 2.4 1.0 - 4.8 K/uL    Mono # 0.8 0.3 - 1.0 K/uL    Eos # 0.1 0.0 - 0.5 K/uL    Baso # 0.12 0.00 - 0.20 K/uL    nRBC 0 0 /100 WBC    Gran % 65.2 38.0 - 73.0 %    Lymph % 24.1 18.0 - 48.0 %    Mono % 8.0 4.0 - 15.0 %    Eosinophil % 1.2 0.0 - 8.0 %    Basophil % 1.2 0.0 - 1.9 %    Differential Method Automated    Comprehensive metabolic panel    Collection Time: 02/03/24  1:19 AM   Result Value Ref Range    Sodium 141 136 - 145 mmol/L    Potassium 4.1 3.5 - 5.1 mmol/L    Chloride 104 95 - 110 mmol/L    CO2 26 23 - 29 mmol/L    Glucose 177 (H) 70 - 110 mg/dL    BUN 16 8 - 23 mg/dL    Creatinine 1.4 0.5 - 1.4 mg/dL    Calcium 9.4 8.7 - 10.5 mg/dL    Total Protein 7.3 6.0 - 8.4 g/dL    Albumin 4.0 3.5 - 5.2 g/dL    Total Bilirubin 0.3 0.1 - 1.0 mg/dL    Alkaline Phosphatase 72 55 - 135 U/L    AST 29 10 - 40 U/L    ALT 33 10 -  44 U/L    eGFR 54.4 (A) >60 mL/min/1.73 m^2    Anion Gap 11 8 - 16 mmol/L   Brain natriuretic peptide    Collection Time: 02/03/24  1:19 AM   Result Value Ref Range    BNP 19 0 - 99 pg/mL   Troponin I    Collection Time: 02/03/24  1:19 AM   Result Value Ref Range    Troponin I 0.482 (H) 0.000 - 0.026 ng/mL   D dimer, quantitative    Collection Time: 02/03/24  1:19 AM   Result Value Ref Range    D-Dimer 0.30 <0.50 mg/L FEU   Protime-INR    Collection Time: 02/03/24  1:19 AM   Result Value Ref Range    Prothrombin Time 10.6 9.0 - 12.5 sec    INR 0.9 0.8 - 1.2   APTT    Collection Time: 02/03/24  1:19 AM   Result Value Ref Range    aPTT 27.6 21.0 - 32.0 sec   Troponin I #2    Collection Time: 02/03/24  3:32 AM   Result Value Ref Range    Troponin I 0.932 (H) 0.000 - 0.026 ng/mL   CBC auto differential    Collection Time: 02/03/24  5:02 AM   Result Value Ref Range    WBC 11.20 3.90 - 12.70 K/uL    RBC 4.83 4.60 - 6.20 M/uL    Hemoglobin 14.6 14.0 - 18.0 g/dL    Hematocrit 43.6 40.0 - 54.0 %    MCV 90 82 - 98 fL    MCH 30.2 27.0 - 31.0 pg    MCHC 33.5 32.0 - 36.0 g/dL    RDW 12.8 11.5 - 14.5 %    Platelets 279 150 - 450 K/uL    MPV 9.6 9.2 - 12.9 fL    Immature Granulocytes 0.3 0.0 - 0.5 %    Gran # (ANC) 6.7 1.8 - 7.7 K/uL    Immature Grans (Abs) 0.03 0.00 - 0.04 K/uL    Lymph # 3.4 1.0 - 4.8 K/uL    Mono # 0.8 0.3 - 1.0 K/uL    Eos # 0.2 0.0 - 0.5 K/uL    Baso # 0.11 0.00 - 0.20 K/uL    nRBC 0 0 /100 WBC    Gran % 59.7 38.0 - 73.0 %    Lymph % 30.0 18.0 - 48.0 %    Mono % 7.2 4.0 - 15.0 %    Eosinophil % 1.8 0.0 - 8.0 %    Basophil % 1.0 0.0 - 1.9 %    Platelet Estimate Appears normal     Differential Method Automated    APTT    Collection Time: 02/03/24  6:03 AM   Result Value Ref Range    aPTT 44.6 (H) 21.0 - 32.0 sec   Troponin I    Collection Time: 02/03/24  6:03 AM   Result Value Ref Range    Troponin I 2.878 (H) 0.000 - 0.026 ng/mL   Type & Screen    Collection Time: 02/03/24  6:03 AM   Result Value Ref Range     Group & Rh A POS     Indirect Alice NEG     Specimen Outdate 02/06/2024 23:59        RADIOLOGY  X-Ray Chest AP Portable    Result Date: 2/3/2024  EXAMINATION: XR CHEST AP PORTABLE CLINICAL HISTORY: Shortness of breath TECHNIQUE: Single frontal view of the chest was performed. COMPARISON: 01/21/2020 FINDINGS: Lungs are clear. No focal consolidation. No pleural effusion. No pneumothorax. Normal heart size.     No acute findings. Electronically signed by: Rupesh Henderson Date:    02/03/2024 Time:    01:47      EKG    MICROBIOLOGY    MDM     Amount and/or Complexity of Data Reviewed  Clinical lab tests: reviewed  Tests in the radiology section of CPT®: reviewed  Tests in the medicine section of CPT®: reviewed  Discussion of test results with the performing providers: yes  Decide to obtain previous medical records or to obtain history from someone other than the patient: yes  Obtain history from someone other than the patient: yes  Review and summarize past medical records: yes  Discuss the patient with other providers: yes  Independent visualization of images, tracings, or specimens: yes

## 2024-02-03 NOTE — ASSESSMENT & PLAN NOTE
"Patient presents with NSTEMI. Chest pain is currently controlled. LALA score is 4. Patient is currently on NSTEMI Pathway.    EKG reviewed. Troponins reviewed and results noted-   Recent Labs   Lab 02/03/24  0603   TROPONINI 2.878*   .     Lipid panel reviewed and shows-     No results found for: "LDLCALC"  No results found for: "TRIG"      Medical management includes; Beta Blocker, Anticoagulation, and Nitrate Echo has not been performed. Latest ECHO results are as follows- No results found for this or any previous visit.  .   Consult for cardiac rehab is ordered. Patient counseled on lifestyle modifications- quit smoking, follow a low fat, low cholesterol diet, attempt to lose weight, reduce salt in diet and cooking, reduce exposure to stress, improve dietary compliance, continue current medications, and return for routine annual checkups. Cardiology is consulted. Plan of care reviewed with cardiology team. Continue to monitor patient closely and adjust therapy as needed.      "

## 2024-02-03 NOTE — Clinical Note
The closure device was deployed in the left radial artery. 12 cc's of air were inserted into the closure device.

## 2024-02-03 NOTE — PLAN OF CARE
Nutrition Recommendations 2/3/24:  1. Recommend Cardiac diet   2. Recommend Boost plus BID   3. Recommend Blaine BID for wound healing   4. Weight twice weekly  5. Collaboration with other providers     Goals:   1. Pt will tolerate and consume > 75% EEN and EPN prior to RD follow up   2. Pt will consume Blaine BID prior to RD follow up    Zulma Mccormack, Registration Eligible, Provisional LDN

## 2024-02-03 NOTE — HPI
Eber Wilson is a 69 y.o. male with a PMH  has a past medical history of Anxiety, COPD (chronic obstructive pulmonary disease), Diverticular disease, and Hypertension.  Presents as a transfer from our [facility] for NSTEMI and Cardiology consult.  Patient reports he was taking groceries out in his vehicle around 1900 last night when he started having chest discomfort in bilateral arm pain.  Symptoms lasted for a few moments and resolved on its own after he laid down.  Around 2200 patient experienced the same pain while lying down getting ready for bed.  Again, symptoms lasted momentarily resolved after taking 2 baby aspirin.  Patient denies personal history of heart disease, but states that his brother in both his parents suffered from cardiac issues including MI and open heart surgery.  Patient currently asymptomatic and denies any other associated symptoms.    ER workup revealed CBG of 177 mg/dL, initial troponin of 0.4 a 2 with delta troponin of 0.932.  Chest x-ray with no acute findings.  Initial EKG showed sinus rhythm with PACs with a ventricular rate of 90 beats per minute and a QT/QTC of 364/445.  Repeat EKG roughly 1 hour later showed sinus rhythm with marked sinus arrhythmia and left axis deviation with ventricular rate of 86 beats per minute and a QT/QTC of 360/430.  On-call cardiologist consulted (Dr. Alfaro).  Patient initiated on heparin drip. Patient also received 1 g of Tylenol, 162 mg aspirin, 1 mg of Ativan, 50 mg metoprolol 2 mg of morphine, and 1 in of nitroglycerin paste. Hospital Medicine consulted to admit patient for NSTEMI and Cardiology evaluation.  Patient was transferred from ED to ED due to no beds available on floor.  Patient admitted under inpatient status.    PCP: Michael Lopez

## 2024-02-03 NOTE — Clinical Note
The left ventricle was injected. The injected rate was 15 mL/sec. The total injected volume was 20 mL.

## 2024-02-03 NOTE — CARE UPDATE
Care update:   Patient admitted for chest pain and NSTEMI.  He underwent left heart catheterization:  PCI 99% mid LAD tx w Synergy BARAK 2.5 x 28 mm x one (posted to 3.0 mm).  60 - 70% ostial Ramus: iFR 0.86  50% ectatic mid RCA.  EF 50% w apical WMA.  LVEDP 31    Discussed need for smoking cessation counseled for greater than 10 minutes.  Discussed importance of taking medications as prescribed.

## 2024-02-03 NOTE — SUBJECTIVE & OBJECTIVE
Past Medical History:   Diagnosis Date    Anxiety     COPD (chronic obstructive pulmonary disease)     Diverticular disease     Hypertension        Past Surgical History:   Procedure Laterality Date    COLOSTOMY CLOSURE      HERNIA REPAIR      LAPAROSCOPIC COLOSTOMY         Review of patient's allergies indicates:   Allergen Reactions    Amoxicillin Itching    Penicillins     Adhesive Rash       No current facility-administered medications on file prior to encounter.     Current Outpatient Medications on File Prior to Encounter   Medication Sig    albuterol (PROVENTIL/VENTOLIN HFA) 90 mcg/actuation inhaler Inhale 2 puffs into the lungs every 6 (six) hours as needed for Wheezing. Rescue    ALPRAZolam (XANAX) 0.25 MG tablet Take 0.25 mg by mouth.    aspirin (ECOTRIN) 81 MG EC tablet Take 1 tablet by mouth once daily.    fluticasone-salmeterol diskus inhaler 250-50 mcg Inhale 1 puff into the lungs 2 (two) times a day.    lisinopriL-hydrochlorothiazide (PRINZIDE,ZESTORETIC) 20-12.5 mg per tablet Take 1 tablet by mouth once daily.    meclizine (ANTIVERT) 25 mg tablet Take 25 mg by mouth 2 (two) times daily as needed.    pantoprazole (PROTONIX) 20 MG tablet Take 20 mg by mouth every morning.    [DISCONTINUED] ibuprofen (ADVIL,MOTRIN) 800 MG tablet Take 800 mg by mouth every 12 (twelve) hours as needed.    [DISCONTINUED] mupirocin (BACTROBAN) 2 % ointment Apply topically 2 (two) times daily.    [DISCONTINUED] predniSONE (DELTASONE) 10 MG tablet Take 10 mg by mouth 2 (two) times daily.    atorvastatin (LIPITOR) 10 MG tablet Take 10 mg by mouth every evening.    [DISCONTINUED] alprazolam (XANAX) 0.5 MG tablet Take 1 tablet (0.5 mg total) by mouth 3 (three) times daily as needed for Anxiety.    [DISCONTINUED] aspirin (ECOTRIN) 81 MG EC tablet Take 81 mg by mouth once daily.    [DISCONTINUED] BUDESONIDE/FORMOTEROL FUMARATE (SYMBICORT INHL) Inhale into the lungs.    [DISCONTINUED] lisinopril-hydrochlorothiazide  (PRINZIDE,ZESTORETIC) 20-12.5 mg per tablet Take 1 tablet by mouth once daily.    [DISCONTINUED] naproxen (NAPROSYN) 500 MG tablet Take 1 tablet (500 mg total) by mouth 2 (two) times daily with meals.     Family History       Problem Relation (Age of Onset)    COPD Mother, Father    Heart attack Mother, Father          Tobacco Use    Smoking status: Every Day     Current packs/day: 1.50     Average packs/day: 1.5 packs/day for 20.0 years (30.0 ttl pk-yrs)     Types: Cigarettes     Start date: 2/3/2004    Smokeless tobacco: Never   Substance and Sexual Activity    Alcohol use: No    Drug use: No    Sexual activity: Yes     Partners: Female     Review of Systems   Constitutional: Negative for diaphoresis, malaise/fatigue, weight gain and weight loss.   HENT:  Negative for congestion and nosebleeds.    Cardiovascular:  Negative for chest pain, claudication, cyanosis, dyspnea on exertion, irregular heartbeat, leg swelling, near-syncope, orthopnea, palpitations, paroxysmal nocturnal dyspnea and syncope.   Respiratory:  Positive for shortness of breath. Negative for cough, hemoptysis, sleep disturbances due to breathing, snoring, sputum production and wheezing.    Hematologic/Lymphatic: Negative for bleeding problem. Does not bruise/bleed easily.   Skin:  Negative for rash.   Musculoskeletal:  Positive for myalgias. Negative for arthritis, back pain, falls, joint pain, muscle cramps and muscle weakness.   Gastrointestinal:  Negative for abdominal pain, constipation, diarrhea, heartburn, hematemesis, hematochezia, melena, nausea and vomiting.   Genitourinary:  Negative for dysuria, hematuria and nocturia.   Neurological:  Negative for excessive daytime sleepiness, dizziness, headaches, light-headedness, loss of balance, numbness, vertigo and weakness.     Objective:     Vital Signs (Most Recent):  Temp: 98 °F (36.7 °C) (02/03/24 1004)  Pulse: 75 (02/03/24 1229)  Resp: (!) 28 (02/03/24 1229)  BP: (!) 138/96 (02/03/24  1229)  SpO2: (!) 94 % (02/03/24 1229) Vital Signs (24h Range):  Temp:  [98 °F (36.7 °C)-98.2 °F (36.8 °C)] 98 °F (36.7 °C)  Pulse:  [66-91] 75  Resp:  [19-29] 28  SpO2:  [92 %-97 %] 94 %  BP: (117-169)/(64-96) 138/96     Weight: 97.1 kg (214 lb)  Body mass index is 32.54 kg/m².    SpO2: (!) 94 %         Intake/Output Summary (Last 24 hours) at 2/3/2024 1235  Last data filed at 2/3/2024 0800  Gross per 24 hour   Intake --   Output 350 ml   Net -350 ml       Lines/Drains/Airways       Peripheral Intravenous Line  Duration                  Peripheral IV - Single Lumen 02/03/24 0107 20 G Right Antecubital <1 day         Peripheral IV - Single Lumen 02/03/24 1227 20 G Right Hand <1 day         Peripheral IV - Single Lumen 02/03/24 20 G Left Antecubital <1 day                     Physical Exam  Vitals and nursing note reviewed.   Constitutional:       Appearance: Normal appearance. He is well-developed.   HENT:      Head: Normocephalic.      Mouth/Throat:      Mouth: Mucous membranes are moist.   Neck:      Vascular: No carotid bruit or JVD.   Cardiovascular:      Rate and Rhythm: Normal rate and regular rhythm.      Pulses: Normal pulses.      Heart sounds: Normal heart sounds. No murmur heard.     No friction rub.   Pulmonary:      Effort: Pulmonary effort is normal. No respiratory distress.      Breath sounds: Normal breath sounds. No wheezing or rales.   Abdominal:      General: Bowel sounds are normal. There is no distension.      Palpations: Abdomen is soft.      Tenderness: There is no abdominal tenderness. There is no guarding.   Musculoskeletal:         General: No swelling or tenderness.      Cervical back: Neck supple. No tenderness.      Right lower leg: No edema.      Left lower leg: No edema.   Skin:     General: Skin is warm and dry.      Capillary Refill: Capillary refill takes less than 2 seconds.      Findings: No rash.   Neurological:      General: No focal deficit present.      Mental Status: He is  alert and oriented to person, place, and time.   Psychiatric:         Mood and Affect: Mood normal.         Behavior: Behavior normal.         Thought Content: Thought content normal.          Significant Labs: BMP:   Recent Labs   Lab 02/03/24  0119   *      K 4.1      CO2 26   BUN 16   CREATININE 1.4   CALCIUM 9.4   , CMP   Recent Labs   Lab 02/03/24  0119      K 4.1      CO2 26   *   BUN 16   CREATININE 1.4   CALCIUM 9.4   PROT 7.3   ALBUMIN 4.0   BILITOT 0.3   ALKPHOS 72   AST 29   ALT 33   ANIONGAP 11   , CBC   Recent Labs   Lab 02/03/24  0053 02/03/24  0502   WBC 10.11 11.20   HGB 16.4 14.6   HCT 49.2 43.6   * 279   , INR   Recent Labs   Lab 02/03/24  0119   INR 0.9   , Lipid Panel   Recent Labs   Lab 02/03/24  0502   CHOL 198   HDL 30*   LDLCALC 126.0   TRIG 210*   CHOLHDL 15.2*   , and Troponin   Recent Labs   Lab 02/03/24  0603 02/03/24  0837 02/03/24  1130   TROPONINI 2.878* 6.206* 8.952*       Significant Imaging: Echocardiogram: 2D echo with color flow doppler: No results found for this or any previous visit. and Transthoracic echo (TTE) complete (Cupid Only):   Results for orders placed or performed during the hospital encounter of 02/03/24   Echo   Result Value Ref Range    BSA 2.16 m2    LVOT stroke volume 65.94 cm3    LVIDd 4.59 3.5 - 6.0 cm    LV Systolic Volume 40.06 mL    LV Systolic Volume Index 19.1 mL/m2    LVIDs 3.17 2.1 - 4.0 cm    LV Diastolic Volume 96.81 mL    LV Diastolic Volume Index 46.10 mL/m2    IVS 1.35 (A) 0.6 - 1.1 cm    LVOT diameter 2.00 cm    LVOT area 3.1 cm2    FS 31 28 - 44 %    Left Ventricle Relative Wall Thickness 0.56 cm    Posterior Wall 1.28 (A) 0.6 - 1.1 cm    LV mass 233.28 g    LV Mass Index 111 g/m2    MV Peak E Pedro 0.55 m/s    TDI LATERAL 0.09 m/s    TDI SEPTAL 0.11 m/s    E/E' ratio 5.50 m/s    MV Peak A Pedro 0.73 m/s    TR Max Pedro 1.66 m/s    E/A ratio 0.75     IVRT 76.12 msec    E wave deceleration time 165.86 msec     LV SEPTAL E/E' RATIO 5.00 m/s    LV LATERAL E/E' RATIO 6.11 m/s    LVOT peak lu 1.07 m/s    Left Ventricular Outflow Tract Mean Velocity 0.94 cm/s    Left Ventricular Outflow Tract Mean Gradient 3.55 mmHg    RVOT peak VTI 13.3 cm    TAPSE 1.82 cm    LA size 3.10 cm    Left Atrium Minor Axis 4.90 cm    Left Atrium Major Axis 4.80 cm    RA Major Axis 3.52 cm    AV mean gradient 5 mmHg    AV peak gradient 8 mmHg    Ao peak lu 1.44 m/s    Ao VTI 30.20 cm    LVOT peak VTI 21.00 cm    AV valve area 2.18 cm²    AV Velocity Ratio 0.74     AV index (prosthetic) 0.70     FADUMO by Velocity Ratio 2.33 cm²    Mr max lu 2.94 m/s    MV stenosis pressure 1/2 time 48.10 ms    MV valve area p 1/2 method 4.57 cm2    Triscuspid Valve Regurgitation Peak Gradient 11 mmHg    PV mean gradient 1 mmHg    PV peak gradient 1     RVOT peak lu 0.59 m/s    Ao root annulus 3.21 cm    STJ 3.15 cm    Ascending aorta 3.06 cm    IVC diameter 1.81 cm    Mean e' 0.10 m/s    ZLVIDS -1.82     ZLVIDD -3.52     LA Volume Index 17.6 mL/m2    LA volume 37.06 cm3    LA WIDTH 2.9 cm    RA Width 2.8 cm

## 2024-02-03 NOTE — ASSESSMENT & PLAN NOTE
Will have [facility]  Echo pending  Continue heparin infusion  Start maintenance fluids  Continue ASA  Start statin and BB

## 2024-02-03 NOTE — ED PROVIDER NOTES
Encounter Date: 2/3/2024       History     Chief Complaint   Patient presents with    Chest Pain     Brought in by KRYSTAL, c/o chest pain generalized across entire chest and a heaviness in bilat arms, onset aprox 30 minutes before calling for ambulance      Anterior chest and bilateral upper extremity heaviness and mild discomfort onset about 5 this evening after some minor exertion, waxing and waning all evening and worsened again prior to arrival, improving now on arrival to the ER.  He took 2 baby aspirin earlier today, he does describe some degree of anxiety as well but denies palpitations, nausea, vomiting, diaphoresis, dyspnea, or back pain.  Mild sense of belching are indigestion earlier tonight.  Family history strongly positive for coronary disease, he has been a long-term heavy smoker but has never had any known coronary disease and has not had a cardiac evaluation.  He does have a history of hypertension, anxiety, and COPD.  He does not actually feel that he is experiencing any pain.  No prior such episode.  He has not had an evaluation by a cardiologist.  No other complaints.  Initial EKG has mild ST elevations suggested in leads V2 through V4, asking Cardiology to review.        Review of patient's allergies indicates:   Allergen Reactions    Amoxicillin Itching    Penicillins     Adhesive Rash     Past Medical History:   Diagnosis Date    Anxiety     COPD (chronic obstructive pulmonary disease)     Diverticular disease     Hypertension      Past Surgical History:   Procedure Laterality Date    COLOSTOMY CLOSURE      HERNIA REPAIR      LAPAROSCOPIC COLOSTOMY       Family History   Problem Relation Age of Onset    COPD Mother     COPD Father      Social History     Tobacco Use    Smoking status: Every Day     Current packs/day: 2.00     Types: Cigarettes    Smokeless tobacco: Never   Substance Use Topics    Alcohol use: No    Drug use: No     Review of Systems   Constitutional:  Negative for chills and  fever.   HENT:  Negative for congestion, facial swelling, nosebleeds and sinus pressure.    Eyes:  Negative for pain and redness.   Respiratory:  Negative for chest tightness, shortness of breath and wheezing.    Cardiovascular:  Positive for chest pain. Negative for palpitations and leg swelling.        See hpi   Gastrointestinal:  Negative for abdominal distention, abdominal pain, diarrhea, nausea and vomiting.   Endocrine: Negative for cold intolerance, polydipsia and polyphagia.   Genitourinary:  Negative for difficulty urinating, dysuria, frequency and hematuria.   Musculoskeletal:  Negative for arthralgias, back pain, myalgias and neck pain.   Skin:  Negative for color change and rash.   Neurological:  Negative for dizziness, weakness, numbness and headaches.   Hematological:  Negative for adenopathy. Does not bruise/bleed easily.   Psychiatric/Behavioral:  Negative for agitation and behavioral problems. The patient is nervous/anxious.    All other systems reviewed and are negative.      Physical Exam     Initial Vitals   BP Pulse Resp Temp SpO2   02/03/24 0032 02/03/24 0032 02/03/24 0029 02/03/24 0029 02/03/24 0032   (!) 159/81 89 20 98.2 °F (36.8 °C) (!) 94 %      MAP       --                Physical Exam    Nursing note and vitals reviewed.  Constitutional: He appears well-developed and well-nourished. He is not diaphoretic. He appears distressed.   Mild discomfort; mild obesity   HENT:   Head: Normocephalic and atraumatic.   Mouth/Throat: Oropharynx is clear and moist. No oropharyngeal exudate.   Eyes: Conjunctivae and EOM are normal. Pupils are equal, round, and reactive to light. Right eye exhibits no discharge. Left eye exhibits no discharge. No scleral icterus.   Neck: Neck supple. No thyromegaly present. No tracheal deviation present. No JVD present.   Normal range of motion.  Cardiovascular:  Normal rate, regular rhythm and normal heart sounds.     Exam reveals no gallop and no friction rub.       No  murmur heard.  Pulmonary/Chest: No respiratory distress. He has no wheezes. He has rhonchi. He has no rales. He exhibits no tenderness.   Minimal scattered rhonchi   Abdominal: Abdomen is soft. Bowel sounds are normal. He exhibits no distension and no mass. There is no abdominal tenderness. There is no rebound and no guarding.   Musculoskeletal:         General: No tenderness or edema. Normal range of motion.      Cervical back: Normal range of motion and neck supple.     Lymphadenopathy:     He has no cervical adenopathy.   Neurological: He is alert and oriented to person, place, and time. He has normal strength. No cranial nerve deficit.   Skin: Skin is warm and dry. No rash noted. No erythema.   Psychiatric: He has a normal mood and affect. His behavior is normal. Judgment and thought content normal.   Mildly anxious         ED Course   Critical Care    Date/Time: 2/3/2024 12:49 AM    Performed by: Juwan Jose MD  Authorized by: Juwan Jose MD  Direct patient critical care time: 10 minutes  Additional history critical care time: 10 minutes  Ordering / reviewing critical care time: 10 minutes  Documentation critical care time: 10 minutes  Consulting other physicians critical care time: 5 minutes  Total critical care time (exclusive of procedural time) : 45 minutes  Critical care time was exclusive of separately billable procedures and treating other patients and teaching time.  Critical care was necessary to treat or prevent imminent or life-threatening deterioration of the following conditions: cardiac failure.  Critical care was time spent personally by me on the following activities: development of treatment plan with patient or surrogate, evaluation of patient's response to treatment, examination of patient, obtaining history from patient or surrogate, ordering and performing treatments and interventions, ordering and review of laboratory studies, ordering and review of radiographic studies,  pulse oximetry and re-evaluation of patient's condition.        Labs Reviewed   CBC W/ AUTO DIFFERENTIAL - Abnormal; Notable for the following components:       Result Value    Platelets 141 (*)     All other components within normal limits   COMPREHENSIVE METABOLIC PANEL - Abnormal; Notable for the following components:    Glucose 177 (*)     eGFR 54.4 (*)     All other components within normal limits   TROPONIN I - Abnormal; Notable for the following components:    Troponin I 0.482 (*)     All other components within normal limits   B-TYPE NATRIURETIC PEPTIDE   D DIMER, QUANTITATIVE   PROTIME-INR   APTT   HEMOGLOBIN A1C   TROPONIN I   APTT   CBC W/ AUTO DIFFERENTIAL   HEMOGLOBIN A1C     EKG Readings: (Independently Interpreted)   Initial Reading: No STEMI. Rhythm: Normal Sinus Rhythm. Heart Rate: 90. Ectopy: No Ectopy.   Normal sinus rhythm at 90 beats per minute with PACs, possible old inferior infarction, borderline ST upsloping elevations in the to through V4. Minimal change compared to previous.         Imaging Results              X-Ray Chest AP Portable (Final result)  Result time 02/03/24 01:47:44      Final result by Rupesh Henderson MD (02/03/24 01:47:44)                   Impression:      No acute findings.      Electronically signed by: Rupesh Henderson  Date:    02/03/2024  Time:    01:47               Narrative:    EXAMINATION:  XR CHEST AP PORTABLE    CLINICAL HISTORY:  Shortness of breath    TECHNIQUE:  Single frontal view of the chest was performed.    COMPARISON:  01/21/2020    FINDINGS:  Lungs are clear. No focal consolidation. No pleural effusion. No pneumothorax. Normal heart size.                        Wet Read by Juwan Jose MD (02/03/24 01:06:09, [facility] Emergency Dept, Emergency Medicine)    NAF                                     Medications   heparin 25,000 units in dextrose 5% 250 mL (100 units/mL) infusion LOW INTENSITY nomogram - [facility] (12 Units/kg/hr × 80 kg  (Adjusted) Intravenous New Bag 2/3/24 0120)   heparin 25,000 units in dextrose 5% (100 units/ml) IV bolus from bag - ADDITIONAL PRN BOLUS - 60 units/kg (max bolus 4000 units) (has no administration in time range)   heparin 25,000 units in dextrose 5% (100 units/ml) IV bolus from bag - ADDITIONAL PRN BOLUS - 30 units/kg (max bolus 4000 units) (has no administration in time range)   aspirin chewable tablet 162 mg (162 mg Oral Given 2/3/24 0043)   LORazepam injection 1 mg (1 mg Intravenous Given 2/3/24 0100)   morphine injection 2 mg (2 mg Intravenous Given 2/3/24 0100)   heparin 25,000 units in dextrose 5% (100 units/ml) IV bolus from bag INITIAL BOLUS (max bolus 4000 units) (4,000 Units Intravenous Bolus from Bag 2/3/24 0120)   nitroGLYCERIN 2% TD oint ointment 1 inch (1 inch Topical (Top) Given 2/3/24 0054)   acetaminophen tablet 1,000 mg (1,000 mg Oral Given 2/3/24 0056)   metoprolol tartrate (LOPRESSOR) tablet 50 mg (50 mg Oral Given 2/3/24 0057)         1:15 AM Current and previous EKG reviewed by Dr. Alfaro on-call for Cardiology.  No STEMI; agrees with current management; repeat EKG after 30 minutes, echocardiogram.      1:44 AM Repeat EKG questionably changed in the anterior precordial ST elevations, reviewed again with Dr. Alfaro.  She recommends that this does not represent STEMI, continue present management.  Patient is without chest pain, still has upper extremity heaviness.  Clinically stable, vital signs stable.  Initial chemistries delayed due to lab redraw.    2:01 AM 1st troponin mildly elevated at 0.482.  Working on transfer for Hospital Medicine and Cardiology evaluation.      2:25 AM Accepted for transfer to [facility] - Hospital Medicine - Dr. Cee. Stable for transfer.       Medical Decision Making  Problems Addressed:  Chest pain: acute illness or injury    Amount and/or Complexity of Data Reviewed  Labs: ordered. Decision-making details documented in ED Course.  Radiology: ordered  and independent interpretation performed. Decision-making details documented in ED Course.  ECG/medicine tests: ordered and independent interpretation performed. Decision-making details documented in ED Course.    Risk  OTC drugs.  Prescription drug management.  Parenteral controlled substances.  Decision regarding hospitalization.      Additional MDM:   Differential Diagnosis:   Myocardial infarction, pulmonary embolism, pneumonia, pleurisy, anxiety, other causes of chest pain.                                    Clinical Impression:  Final diagnoses:  [R07.9] Chest pain  [R06.02] SOB (shortness of breath)  [R73.9] Hyperglycemia  [I21.4] NSTEMI (non-ST elevated myocardial infarction) (Primary)          ED Disposition Condition    Admit Stable                Juwan Jose MD  02/03/24 [id number]

## 2024-02-03 NOTE — ASSESSMENT & PLAN NOTE
Chronic. Stable. Not in acute exacerbation and currently denies endorsing any suicidal/homicidal ideations.   Plan:  -Continue home medications (xanax)

## 2024-02-03 NOTE — PLAN OF CARE
Pt resting in bed . Vitals are stable . Hob elevated . Pt denies chest pain. Chart check complete. Will continue to monitor .

## 2024-02-03 NOTE — H&P (VIEW-ONLY)
[facility]  Cardiology  Consult Note    Patient Name: Eber Wilson  MRN: [id number]  Admission Date: 2/3/2024  Hospital Length of Stay: 0 days  Code Status: Full Code   Attending Provider: Gabi Arvizu MD   Consulting Provider: Wilmar Alfaro MD  Primary Care Physician: Michael Lopez, MIGUELITO-C  Principal Problem:NSTEMI (non-ST elevated myocardial infarction)    Patient information was obtained from patient and ER records.     Inpatient consult to Cardiology  Consult performed by: Wilmar Alfaro MD  Consult ordered by: Juwan Jose MD  Reason for consult: nstemi        Subjective:       HPI:   69 y.o. male with a PMH has a past medical history of Anxiety, COPD, HTN (white coat syndrome), tobacco abuse who comes in with bilateral arm pain and chest discomfort that occurred the day PTA. Reports lifting groceries and started having arm pain that became intermittent, had some chest discomfort and SOB. He went to sleep but symptoms did not improve, took 2 ASA then went to ED. Initial troponin 0.48 and trended up to 6.2.  Placed on heparin infusion  Given nitropaste, morphine and metoprolol   EKG with NSR and inferior infarct  Currently without symptoms at rest  Current daily smoker  Echo pending  Reports significant family h/o CAD/MI/CABG  Planning for [facility] in setting of NSTEMI    Past Medical History:   Diagnosis Date    Anxiety     COPD (chronic obstructive pulmonary disease)     Diverticular disease     Hypertension        Past Surgical History:   Procedure Laterality Date    COLOSTOMY CLOSURE      HERNIA REPAIR      LAPAROSCOPIC COLOSTOMY         Review of patient's allergies indicates:   Allergen Reactions    Amoxicillin Itching    Penicillins     Adhesive Rash       No current facility-administered medications on file prior to encounter.     Current Outpatient Medications on File Prior to Encounter   Medication Sig    albuterol (PROVENTIL/VENTOLIN HFA) 90 mcg/actuation inhaler  Inhale 2 puffs into the lungs every 6 (six) hours as needed for Wheezing. Rescue    ALPRAZolam (XANAX) 0.25 MG tablet Take 0.25 mg by mouth.    aspirin (ECOTRIN) 81 MG EC tablet Take 1 tablet by mouth once daily.    fluticasone-salmeterol diskus inhaler 250-50 mcg Inhale 1 puff into the lungs 2 (two) times a day.    lisinopriL-hydrochlorothiazide (PRINZIDE,ZESTORETIC) 20-12.5 mg per tablet Take 1 tablet by mouth once daily.    meclizine (ANTIVERT) 25 mg tablet Take 25 mg by mouth 2 (two) times daily as needed.    pantoprazole (PROTONIX) 20 MG tablet Take 20 mg by mouth every morning.    [DISCONTINUED] ibuprofen (ADVIL,MOTRIN) 800 MG tablet Take 800 mg by mouth every 12 (twelve) hours as needed.    [DISCONTINUED] mupirocin (BACTROBAN) 2 % ointment Apply topically 2 (two) times daily.    [DISCONTINUED] predniSONE (DELTASONE) 10 MG tablet Take 10 mg by mouth 2 (two) times daily.    atorvastatin (LIPITOR) 10 MG tablet Take 10 mg by mouth every evening.    [DISCONTINUED] alprazolam (XANAX) 0.5 MG tablet Take 1 tablet (0.5 mg total) by mouth 3 (three) times daily as needed for Anxiety.    [DISCONTINUED] aspirin (ECOTRIN) 81 MG EC tablet Take 81 mg by mouth once daily.    [DISCONTINUED] BUDESONIDE/FORMOTEROL FUMARATE (SYMBICORT INHL) Inhale into the lungs.    [DISCONTINUED] lisinopril-hydrochlorothiazide (PRINZIDE,ZESTORETIC) 20-12.5 mg per tablet Take 1 tablet by mouth once daily.    [DISCONTINUED] naproxen (NAPROSYN) 500 MG tablet Take 1 tablet (500 mg total) by mouth 2 (two) times daily with meals.     Family History       Problem Relation (Age of Onset)    COPD Mother, Father    Heart attack Mother, Father          Tobacco Use    Smoking status: Every Day     Current packs/day: 1.50     Average packs/day: 1.5 packs/day for 20.0 years (30.0 ttl pk-yrs)     Types: Cigarettes     Start date: 2/3/2004    Smokeless tobacco: Never   Substance and Sexual Activity    Alcohol use: No    Drug use: No    Sexual activity: Yes      Partners: Female     Review of Systems   Constitutional: Negative for diaphoresis, malaise/fatigue, weight gain and weight loss.   HENT:  Negative for congestion and nosebleeds.    Cardiovascular:  Negative for chest pain, claudication, cyanosis, dyspnea on exertion, irregular heartbeat, leg swelling, near-syncope, orthopnea, palpitations, paroxysmal nocturnal dyspnea and syncope.   Respiratory:  Positive for shortness of breath. Negative for cough, hemoptysis, sleep disturbances due to breathing, snoring, sputum production and wheezing.    Hematologic/Lymphatic: Negative for bleeding problem. Does not bruise/bleed easily.   Skin:  Negative for rash.   Musculoskeletal:  Positive for myalgias. Negative for arthritis, back pain, falls, joint pain, muscle cramps and muscle weakness.   Gastrointestinal:  Negative for abdominal pain, constipation, diarrhea, heartburn, hematemesis, hematochezia, melena, nausea and vomiting.   Genitourinary:  Negative for dysuria, hematuria and nocturia.   Neurological:  Negative for excessive daytime sleepiness, dizziness, headaches, light-headedness, loss of balance, numbness, vertigo and weakness.     Objective:     Vital Signs (Most Recent):  Temp: 98 °F (36.7 °C) (02/03/24 1004)  Pulse: 75 (02/03/24 1229)  Resp: (!) 28 (02/03/24 1229)  BP: (!) 138/96 (02/03/24 1229)  SpO2: (!) 94 % (02/03/24 1229) Vital Signs (24h Range):  Temp:  [98 °F (36.7 °C)-98.2 °F (36.8 °C)] 98 °F (36.7 °C)  Pulse:  [66-91] 75  Resp:  [19-29] 28  SpO2:  [92 %-97 %] 94 %  BP: (117-169)/(64-96) 138/96     Weight: 97.1 kg (214 lb)  Body mass index is 32.54 kg/m².    SpO2: (!) 94 %         Intake/Output Summary (Last 24 hours) at 2/3/2024 1235  Last data filed at 2/3/2024 0800  Gross per 24 hour   Intake --   Output 350 ml   Net -350 ml       Lines/Drains/Airways       Peripheral Intravenous Line  Duration                  Peripheral IV - Single Lumen 02/03/24 0107 20 G Right Antecubital <1 day         Peripheral  IV - Single Lumen 02/03/24 1227 20 G Right Hand <1 day         Peripheral IV - Single Lumen 02/03/24 20 G Left Antecubital <1 day                     Physical Exam  Vitals and nursing note reviewed.   Constitutional:       Appearance: Normal appearance. He is well-developed.   HENT:      Head: Normocephalic.      Mouth/Throat:      Mouth: Mucous membranes are moist.   Neck:      Vascular: No carotid bruit or JVD.   Cardiovascular:      Rate and Rhythm: Normal rate and regular rhythm.      Pulses: Normal pulses.      Heart sounds: Normal heart sounds. No murmur heard.     No friction rub.   Pulmonary:      Effort: Pulmonary effort is normal. No respiratory distress.      Breath sounds: Normal breath sounds. No wheezing or rales.   Abdominal:      General: Bowel sounds are normal. There is no distension.      Palpations: Abdomen is soft.      Tenderness: There is no abdominal tenderness. There is no guarding.   Musculoskeletal:         General: No swelling or tenderness.      Cervical back: Neck supple. No tenderness.      Right lower leg: No edema.      Left lower leg: No edema.   Skin:     General: Skin is warm and dry.      Capillary Refill: Capillary refill takes less than 2 seconds.      Findings: No rash.   Neurological:      General: No focal deficit present.      Mental Status: He is alert and oriented to person, place, and time.   Psychiatric:         Mood and Affect: Mood normal.         Behavior: Behavior normal.         Thought Content: Thought content normal.          Significant Labs: BMP:   Recent Labs   Lab 02/03/24  0119   *      K 4.1      CO2 26   BUN 16   CREATININE 1.4   CALCIUM 9.4   , CMP   Recent Labs   Lab 02/03/24  0119      K 4.1      CO2 26   *   BUN 16   CREATININE 1.4   CALCIUM 9.4   PROT 7.3   ALBUMIN 4.0   BILITOT 0.3   ALKPHOS 72   AST 29   ALT 33   ANIONGAP 11   , CBC   Recent Labs   Lab 02/03/24  0053 02/03/24  0502   WBC 10.11 11.20   HGB 16.4  14.6   HCT 49.2 43.6   * 279   , INR   Recent Labs   Lab 02/03/24  0119   INR 0.9   , Lipid Panel   Recent Labs   Lab 02/03/24  0502   CHOL 198   HDL 30*   LDLCALC 126.0   TRIG 210*   CHOLHDL 15.2*   , and Troponin   Recent Labs   Lab 02/03/24  0603 02/03/24  0837 02/03/24  1130   TROPONINI 2.878* 6.206* 8.952*       Significant Imaging: Echocardiogram: 2D echo with color flow doppler: No results found for this or any previous visit. and Transthoracic echo (TTE) complete ([facility] Only):   Results for orders placed or performed during the hospital encounter of 02/03/24   Echo   Result Value Ref Range    BSA 2.16 m2    LVOT stroke volume 65.94 cm3    LVIDd 4.59 3.5 - 6.0 cm    LV Systolic Volume 40.06 mL    LV Systolic Volume Index 19.1 mL/m2    LVIDs 3.17 2.1 - 4.0 cm    LV Diastolic Volume 96.81 mL    LV Diastolic Volume Index 46.10 mL/m2    IVS 1.35 (A) 0.6 - 1.1 cm    LVOT diameter 2.00 cm    LVOT area 3.1 cm2    FS 31 28 - 44 %    Left Ventricle Relative Wall Thickness 0.56 cm    Posterior Wall 1.28 (A) 0.6 - 1.1 cm    LV mass 233.28 g    LV Mass Index 111 g/m2    MV Peak E Pedro 0.55 m/s    TDI LATERAL 0.09 m/s    TDI SEPTAL 0.11 m/s    E/E' ratio 5.50 m/s    MV Peak A Pedro 0.73 m/s    TR Max Pedro 1.66 m/s    E/A ratio 0.75     IVRT 76.12 msec    E wave deceleration time 165.86 msec    LV SEPTAL E/E' RATIO 5.00 m/s    LV LATERAL E/E' RATIO 6.11 m/s    LVOT peak pedro 1.07 m/s    Left Ventricular Outflow Tract Mean Velocity 0.94 cm/s    Left Ventricular Outflow Tract Mean Gradient 3.55 mmHg    RVOT peak VTI 13.3 cm    TAPSE 1.82 cm    LA size 3.10 cm    Left Atrium Minor Axis 4.90 cm    Left Atrium Major Axis 4.80 cm    RA Major Axis 3.52 cm    AV mean gradient 5 mmHg    AV peak gradient 8 mmHg    Ao peak pedro 1.44 m/s    Ao VTI 30.20 cm    LVOT peak VTI 21.00 cm    AV valve area 2.18 cm²    AV Velocity Ratio 0.74     AV index (prosthetic) 0.70     FADUMO by Velocity Ratio 2.33 cm²    Mr max pedro 2.94 m/s    MV  stenosis pressure 1/2 time 48.10 ms    MV valve area p 1/2 method 4.57 cm2    Triscuspid Valve Regurgitation Peak Gradient 11 mmHg    PV mean gradient 1 mmHg    PV peak gradient 1     RVOT peak lu 0.59 m/s    Ao root annulus 3.21 cm    STJ 3.15 cm    Ascending aorta 3.06 cm    IVC diameter 1.81 cm    Mean e' 0.10 m/s    ZLVIDS -1.82     ZLVIDD -3.52     LA Volume Index 17.6 mL/m2    LA volume 37.06 cm3    LA WIDTH 2.9 cm    RA Width 2.8 cm     Assessment and Plan:     * NSTEMI (non-ST elevated myocardial infarction)  Will have [facility]  Echo pending  Continue heparin infusion  Start maintenance fluids  Continue ASA  Start statin and BB    HTN (hypertension)  Elevated  Recommend starting ACEi/ARB    Tobacco abuse  Smoking cessation encouraged.        VTE Risk Mitigation (From admission, onward)           Ordered     IP VTE HIGH RISK PATIENT  Once         02/03/24 0535     Place sequential compression device  Until discontinued         02/03/24 0535     heparin 25,000 units in dextrose 5% (100 units/ml) IV bolus from bag - ADDITIONAL PRN BOLUS - 60 units/kg (max bolus 4000 units)  As needed (PRN)        Question:  Heparin Infusion Adjustment (DO NOT MODIFY ANSWER)  Answer:  \\[email or web address]\Images\Pharmacy\HeparinInfusions\heparin LOW INTENSITY nomogram for [facility] [id number]    02/03/24 0045     heparin 25,000 units in dextrose 5% (100 units/ml) IV bolus from bag - ADDITIONAL PRN BOLUS - 30 units/kg (max bolus 4000 units)  As needed (PRN)        Question:  Heparin Infusion Adjustment (DO NOT MODIFY ANSWER)  Answer:  [email or web address]\Images\Pharmacy\HeparinInfusions\heparin LOW INTENSITY nomogram for [facility] [id number]    02/03/24 0045     heparin 25,000 units in dextrose 5% 250 mL (100 units/mL) infusion LOW INTENSITY nomogram - [facility]  Continuous        Question:  Begin at (units/kg/hr)  Answer:  12    02/03/24 0045                    Thank you for your consult. I will follow-up with patient. Please contact us if you have any  additional questions.    Wilmar Alfaro MD  Cardiology   [facility] - Emergency Dept.

## 2024-02-04 VITALS
WEIGHT: 214 LBS | HEART RATE: 71 BPM | DIASTOLIC BLOOD PRESSURE: 93 MMHG | SYSTOLIC BLOOD PRESSURE: 122 MMHG | TEMPERATURE: 98 F | BODY MASS INDEX: 32.43 KG/M2 | RESPIRATION RATE: 18 BRPM | OXYGEN SATURATION: 97 % | HEIGHT: 68 IN

## 2024-02-04 LAB
ALBUMIN SERPL BCP-MCNC: 3.5 G/DL (ref 3.5–5.2)
ALP SERPL-CCNC: 58 U/L (ref 55–135)
ALT SERPL W/O P-5'-P-CCNC: 41 U/L (ref 10–44)
ANION GAP SERPL CALC-SCNC: 9 MMOL/L (ref 8–16)
AST SERPL-CCNC: 63 U/L (ref 10–40)
BASOPHILS # BLD AUTO: 0.11 K/UL (ref 0–0.2)
BASOPHILS NFR BLD: 1.1 % (ref 0–1.9)
BILIRUB SERPL-MCNC: 0.8 MG/DL (ref 0.1–1)
BUN SERPL-MCNC: 13 MG/DL (ref 8–23)
CALCIUM SERPL-MCNC: 8.5 MG/DL (ref 8.7–10.5)
CHLORIDE SERPL-SCNC: 109 MMOL/L (ref 95–110)
CO2 SERPL-SCNC: 23 MMOL/L (ref 23–29)
CREAT SERPL-MCNC: 1.2 MG/DL (ref 0.5–1.4)
DIFFERENTIAL METHOD BLD: ABNORMAL
EOSINOPHIL # BLD AUTO: 0.2 K/UL (ref 0–0.5)
EOSINOPHIL NFR BLD: 2.2 % (ref 0–8)
ERYTHROCYTE [DISTWIDTH] IN BLOOD BY AUTOMATED COUNT: 13.1 % (ref 11.5–14.5)
EST. GFR  (NO RACE VARIABLE): >60 ML/MIN/1.73 M^2
GLUCOSE SERPL-MCNC: 97 MG/DL (ref 70–110)
HCT VFR BLD AUTO: 41.4 % (ref 40–54)
HGB BLD-MCNC: 13.4 G/DL (ref 14–18)
IMM GRANULOCYTES # BLD AUTO: 0.02 K/UL (ref 0–0.04)
IMM GRANULOCYTES NFR BLD AUTO: 0.2 % (ref 0–0.5)
LYMPHOCYTES # BLD AUTO: 2.9 K/UL (ref 1–4.8)
LYMPHOCYTES NFR BLD: 28.5 % (ref 18–48)
MCH RBC QN AUTO: 29.6 PG (ref 27–31)
MCHC RBC AUTO-ENTMCNC: 32.4 G/DL (ref 32–36)
MCV RBC AUTO: 92 FL (ref 82–98)
MONOCYTES # BLD AUTO: 1 K/UL (ref 0.3–1)
MONOCYTES NFR BLD: 9.6 % (ref 4–15)
NEUTROPHILS # BLD AUTO: 6 K/UL (ref 1.8–7.7)
NEUTROPHILS NFR BLD: 58.4 % (ref 38–73)
NRBC BLD-RTO: 0 /100 WBC
PLATELET # BLD AUTO: 269 K/UL (ref 150–450)
PMV BLD AUTO: 9.7 FL (ref 9.2–12.9)
POCT GLUCOSE: 101 MG/DL (ref 70–110)
POCT GLUCOSE: 127 MG/DL (ref 70–110)
POTASSIUM SERPL-SCNC: 4.3 MMOL/L (ref 3.5–5.1)
PROT SERPL-MCNC: 6.1 G/DL (ref 6–8.4)
RBC # BLD AUTO: 4.52 M/UL (ref 4.6–6.2)
SODIUM SERPL-SCNC: 141 MMOL/L (ref 136–145)
TROPONIN I SERPL DL<=0.01 NG/ML-MCNC: 10.78 NG/ML (ref 0–0.03)
TROPONIN I SERPL DL<=0.01 NG/ML-MCNC: 7 NG/ML (ref 0–0.03)
WBC # BLD AUTO: 10.31 K/UL (ref 3.9–12.7)

## 2024-02-04 PROCEDURE — 80053 COMPREHEN METABOLIC PANEL: CPT | Performed by: INTERNAL MEDICINE

## 2024-02-04 PROCEDURE — 36415 COLL VENOUS BLD VENIPUNCTURE: CPT | Performed by: STUDENT IN AN ORGANIZED HEALTH CARE EDUCATION/TRAINING PROGRAM

## 2024-02-04 PROCEDURE — 25000242 PHARM REV CODE 250 ALT 637 W/ HCPCS: Performed by: INTERNAL MEDICINE

## 2024-02-04 PROCEDURE — 99233 SBSQ HOSP IP/OBS HIGH 50: CPT | Mod: ,,, | Performed by: STUDENT IN AN ORGANIZED HEALTH CARE EDUCATION/TRAINING PROGRAM

## 2024-02-04 PROCEDURE — 94761 N-INVAS EAR/PLS OXIMETRY MLT: CPT

## 2024-02-04 PROCEDURE — S4991 NICOTINE PATCH NONLEGEND: HCPCS | Performed by: INTERNAL MEDICINE

## 2024-02-04 PROCEDURE — 84484 ASSAY OF TROPONIN QUANT: CPT | Performed by: INTERNAL MEDICINE

## 2024-02-04 PROCEDURE — 25000003 PHARM REV CODE 250: Performed by: INTERNAL MEDICINE

## 2024-02-04 PROCEDURE — 25000003 PHARM REV CODE 250: Performed by: NURSE PRACTITIONER

## 2024-02-04 PROCEDURE — 85025 COMPLETE CBC W/AUTO DIFF WBC: CPT | Performed by: INTERNAL MEDICINE

## 2024-02-04 PROCEDURE — 84484 ASSAY OF TROPONIN QUANT: CPT | Mod: 91 | Performed by: STUDENT IN AN ORGANIZED HEALTH CARE EDUCATION/TRAINING PROGRAM

## 2024-02-04 PROCEDURE — 36415 COLL VENOUS BLD VENIPUNCTURE: CPT | Mod: XB | Performed by: INTERNAL MEDICINE

## 2024-02-04 PROCEDURE — 25000003 PHARM REV CODE 250: Performed by: STUDENT IN AN ORGANIZED HEALTH CARE EDUCATION/TRAINING PROGRAM

## 2024-02-04 PROCEDURE — 94640 AIRWAY INHALATION TREATMENT: CPT

## 2024-02-04 RX ORDER — METOPROLOL SUCCINATE 50 MG/1
25 TABLET, EXTENDED RELEASE ORAL DAILY
Qty: 45 TABLET | Refills: 0 | Status: SHIPPED | OUTPATIENT
Start: 2024-02-04 | End: 2024-02-15 | Stop reason: SDUPTHER

## 2024-02-04 RX ORDER — HYDRALAZINE HYDROCHLORIDE 25 MG/1
25 TABLET, FILM COATED ORAL EVERY 8 HOURS PRN
Status: DISCONTINUED | OUTPATIENT
Start: 2024-02-04 | End: 2024-02-04 | Stop reason: HOSPADM

## 2024-02-04 RX ORDER — PRASUGREL 10 MG/1
10 TABLET, FILM COATED ORAL DAILY
Qty: 30 TABLET | Refills: 0 | Status: SHIPPED | OUTPATIENT
Start: 2024-02-05 | End: 2024-02-15 | Stop reason: SDUPTHER

## 2024-02-04 RX ORDER — NITROGLYCERIN 0.4 MG/1
0.4 TABLET SUBLINGUAL EVERY 5 MIN PRN
Qty: 25 TABLET | Status: SHIPPED | OUTPATIENT
Start: 2024-02-04 | End: 2025-02-03

## 2024-02-04 RX ORDER — IBUPROFEN 200 MG
1 TABLET ORAL DAILY
Qty: 21 PATCH | Refills: 0 | Status: SHIPPED | OUTPATIENT
Start: 2024-02-05 | End: 2024-02-26

## 2024-02-04 RX ORDER — ISOSORBIDE MONONITRATE 30 MG/1
30 TABLET, EXTENDED RELEASE ORAL DAILY
Qty: 90 TABLET | Refills: 0 | Status: SHIPPED | OUTPATIENT
Start: 2024-02-05 | End: 2024-02-15 | Stop reason: SDUPTHER

## 2024-02-04 RX ORDER — NAPROXEN SODIUM 220 MG/1
81 TABLET, FILM COATED ORAL DAILY
Qty: 360 TABLET | Refills: 0 | Status: SHIPPED | OUTPATIENT
Start: 2024-02-05 | End: 2024-02-15

## 2024-02-04 RX ORDER — ATORVASTATIN CALCIUM 80 MG/1
80 TABLET, FILM COATED ORAL DAILY
Qty: 90 TABLET | Refills: 0 | Status: SHIPPED | OUTPATIENT
Start: 2024-02-05 | End: 2024-02-15 | Stop reason: SDUPTHER

## 2024-02-04 RX ORDER — LISINOPRIL AND HYDROCHLOROTHIAZIDE 12.5; 2 MG/1; MG/1
1 TABLET ORAL DAILY
Start: 2024-02-04 | End: 2024-02-15

## 2024-02-04 RX ADMIN — ATORVASTATIN CALCIUM 80 MG: 40 TABLET, FILM COATED ORAL at 09:02

## 2024-02-04 RX ADMIN — PRASUGREL 10 MG: 10 TABLET, FILM COATED ORAL at 09:02

## 2024-02-04 RX ADMIN — ALPRAZOLAM 0.25 MG: 0.25 TABLET ORAL at 04:02

## 2024-02-04 RX ADMIN — BUDESONIDE 0.5 MG: 0.5 INHALANT RESPIRATORY (INHALATION) at 07:02

## 2024-02-04 RX ADMIN — ARFORMOTEROL TARTRATE 15 MCG: 15 SOLUTION RESPIRATORY (INHALATION) at 07:02

## 2024-02-04 RX ADMIN — ASPIRIN 81 MG CHEWABLE TABLET 81 MG: 81 TABLET CHEWABLE at 09:02

## 2024-02-04 RX ADMIN — ALPRAZOLAM 0.25 MG: 0.25 TABLET ORAL at 09:02

## 2024-02-04 RX ADMIN — ISOSORBIDE MONONITRATE 30 MG: 30 TABLET, EXTENDED RELEASE ORAL at 09:02

## 2024-02-04 RX ADMIN — METOPROLOL TARTRATE 25 MG: 25 TABLET, FILM COATED ORAL at 09:02

## 2024-02-04 RX ADMIN — NICOTINE 1 PATCH: 21 PATCH, EXTENDED RELEASE TRANSDERMAL at 09:02

## 2024-02-04 NOTE — PLAN OF CARE
[facility] - Telemetry (Hospital)  Initial Discharge Assessment       Primary Care Provider: Michael Lopez FNP-C    Admission Diagnosis: SOB (shortness of breath) [R06.02]  Hyperglycemia [R73.9]  Non-ST elevation myocardial infarction (NSTEMI) [I21.4]  NSTEMI (non-ST elevation myocardial infarction) [I21.4]  NSTEMI (non-ST elevated myocardial infarction) [I21.4]  Chest pain [R07.9]    Admission Date: 2/3/2024  Expected Discharge Date:     Transition of Care Barriers: None    Payor: [employer] MEDICARE / Plan: [employer] HMO PPO SPECIAL NEEDS / Product Type: Medicare Advantage /     Extended Emergency Contact Information  Primary Emergency Contact: Alyssa Wilson   [place]  Mobile Phone: [phone]  Relation: Daughter    Discharge Plan A: Home         [facility] [place] [address]  [address]  [place] [address]  Phone: [phone] Fax: [phone]      Initial Assessment (most recent)       Adult Discharge Assessment - 02/04/24 1000          Discharge Assessment    Assessment Type Discharge Planning Assessment     Confirmed/corrected address, phone number and insurance Yes     Confirmed Demographics Correct on Facesheet     Source of Information patient     Does patient/caregiver understand observation status Yes     Communicated VICKEY with patient/caregiver Date not available/Unable to determine     People in Home significant other     Do you expect to return to your current living situation? Yes     Do you have help at home or someone to help you manage your care at home? No     Prior to hospitilization cognitive status: Alert/Oriented     Current cognitive status: Alert/Oriented     Walking or Climbing Stairs Difficulty no     Dressing/Bathing Difficulty no     Equipment Currently Used at Home none     Readmission within 30 days? No     Patient currently being followed by outpatient case management? No     Do you currently have  service(s) that help you manage your care at home? No     Do you take prescription medications? Yes     Do you have prescription coverage? Yes     Do you have any problems affording any of your prescribed medications? No     Is the patient taking medications as prescribed? yes     Who is going to help you get home at discharge? family     How do you get to doctors appointments? car, drives self;family or friend will provide     Are you on dialysis? No     Do you take coumadin? No     Discharge Plan A Home     DME Needed Upon Discharge  none     Discharge Plan discussed with: Patient     Transition of Care Barriers None

## 2024-02-04 NOTE — ASSESSMENT & PLAN NOTE
Will have [facility]  Echo pending  Continue heparin infusion  Start maintenance fluids  Continue ASA  Start statin and BB    2/4/24  -Mid LID stent placement seen on heart cath, also with 60-70 % osatialiramus disease insignficant hemodynamically right bFR 505 mid RCA   -Echo showed 50% with RWMA   -placed on effient and aspirin   -Cherck another troponin, last troponin level 10.8   -Continue imdur , metoprolol    -will f/u in cardiology clinic

## 2024-02-04 NOTE — HOSPITAL COURSE
2/4/24-Patient seen and examined today lying in bed. The patient denies any CP or arm pain with mid LID stent yesterday. Continue imdur and metoprolol.  Labs reviewed, Echo showed 50% with RWMA. Another troponin ordered.

## 2024-02-05 ENCOUNTER — TELEPHONE (OUTPATIENT)
Dept: CARDIOLOGY | Facility: CLINIC | Age: 69
End: 2024-02-05
Payer: MEDICARE

## 2024-02-05 NOTE — TELEPHONE ENCOUNTER
Contacted pt to make sure he gets his medications. Pt states his daughter is on her way back w/ them now. He states one rx had to be brought somewhere else because the pharmacy was out. Informed him to call us if he had any other issues w/ get them. Pt lisy w/o q/c

## 2024-02-05 NOTE — TELEPHONE ENCOUNTER
Called pt's fiance back regarding rx questions. She wants to know the most important rx pt needs to take everyday and not miss a dose. Informed her the Effient is the most important. She vu w/o q/c    ----- Message from Angelica Kent sent at 2/5/2024  1:40 PM CST -----  Regarding: pt advice  Contact: [phone]  Pt Finance Jennifer calling in regards to having question about  pt medication. Pls call

## 2024-02-06 ENCOUNTER — TELEPHONE (OUTPATIENT)
Dept: CARDIOLOGY | Facility: HOSPITAL | Age: 69
End: 2024-02-06
Payer: MEDICARE

## 2024-02-07 NOTE — PROGRESS NOTES
[facility])  Cardiology  Progress Note    Patient Name: Eber Wilson  MRN: [id number]  Admission Date: 2/3/2024  Hospital Length of Stay: 1 days  Code Status: Prior   Attending Physician: No att. providers found   Primary Care Physician: Michael Lopez FNP-C  Expected Discharge Date: 2/4/2024  Principal Problem:NSTEMI (non-ST elevated myocardial infarction)    Subjective:     Hospital Course:   2/4/24-Patient seen and examined today lying in bed. The patient denies any CP or arm pain with mid LID stent yesterday. Continue imdur and metoprolol.  Labs reviewed, Echo showed 50% with RWMA. Another troponin ordered.     No new subjective & objective note has been filed under this hospital service since the last note was generated.    Assessment and Plan:         * NSTEMI (non-ST elevated myocardial infarction)  Will have [facility]  Echo pending  Continue heparin infusion  Start maintenance fluids  Continue ASA  Start statin and BB    2/4/24  -Mid LID stent placement seen on heart cath, also with 60-70 % osatialiramus disease insignficant hemodynamically right bFR 505 mid RCA   -Echo showed 50% with RWMA   -placed on effient and aspirin   -Cherck another troponin, last troponin level 10.8   -Continue imdur , metoprolol    -will f/u in cardiology clinic    HTN (hypertension)  Elevated  Recommend starting ACEi/ARB    Tobacco abuse  Smoking cessation encouraged.        VTE Risk Mitigation (From admission, onward)           Ordered     IP VTE HIGH RISK PATIENT  Once         02/03/24 0535                    Wilmar Alfaro MD  Cardiology  [facility])     SPORTS MEDICINE AND PRIMARY CARE  Colby Villalobos MD, [address] [address]  Phone:  [phone]  Fax: [phone]       Chief Complaint   Patient presents with   [address] [place] [address] ED Follow-up   . SUBJECTIVE:    Yumi Albarado is a 64 y.o. female Patient returns today with known history of dyslipidemia, on Crestor, fibromyalgia, fatigue, headaches, prediabetes, and is seen for evaluation. Current Outpatient Medications   Medication Sig Dispense Refill    guaiFENesin ER (MUCINEX) 600 mg ER tablet Take 1 Tab by mouth two (2) times a day. 60 Tab 11    sodium chloride (OCEAN) 0.65 % nasal squeeze bottle 0.1 mL by Both Nostrils route four (4) times daily. 45 mL 11    azithromycin (ZITHROMAX) 250 mg tablet Take 1 Tab by mouth See Admin Instructions for 5 days. 6 Tab 1    rosuvastatin (CRESTOR) 10 mg tablet Take 1 Tab by mouth nightly.  27 Tab 11     Past Medical History:   Diagnosis Date    BPV (benign positional vertigo)     Breast pain     Cigarette smoker     stopped 1-1-14    Dense breast     Dyslipidemia     Fatigue     Fibromyalgia     GERD (gastroesophageal reflux disease)     HA (headache)     Headache     Hypercholesterolemia     Left forearm pain     Lumbar radiculopathy, acute 06/13/2017    Medullary sponge kidney     Myalgia     Nephrolithiasis 10/22/2016    Normal cardiac stress test 12-10-15    STRESS ECHO    Prediabetes     UTI (lower urinary tract infection)      Past Surgical History:   Procedure Laterality Date    HX GYN      HX LITHOTRIPSY       Allergies   Allergen Reactions    Penicillins Hives         REVIEW OF SYSTEMS:  General: negative for - chills or fever  ENT: negative for - headaches, nasal congestion or tinnitus  Respiratory: negative for - cough, hemoptysis, shortness of breath or wheezing  Cardiovascular : negative for - chest pain, edema, palpitations or shortness of breath  Gastrointestinal: negative for - abdominal pain, blood in stools, heartburn or nausea/vomiting  Genito-Urinary: no dysuria, trouble voiding, or hematuria  Musculoskeletal: negative for - gait disturbance, joint pain, joint stiffness or joint swelling  Neurological: no TIA or stroke symptoms  Hematologic: no bruises, no bleeding, no swollen glands  Integument: no lumps, mole changes, nail changes or rash  Endocrine: no malaise/lethargy or unexpected weight changes      Social History     Socioeconomic History    Marital status: SINGLE     Spouse name: Not on file    Number of children: Not on file    Years of education: Not on file    Highest education level: Not on file   Tobacco Use    Smoking status: Never Smoker    Smokeless tobacco: Never Used   Substance and Sexual Activity    Alcohol use: No     Alcohol/week: 0.0 standard drinks    Drug use: No    Sexual activity: Not Currently     Family History   Problem Relation Age of Onset    Cancer Father        OBJECTIVE:    Visit Vitals  /82   Pulse 66   Temp 98 °F (36.7 °C) (Oral)   Resp 16   Ht 5' 5\" (1.651 m)   Wt 133 lb 1.6 oz (60.4 kg)   SpO2 98%   BMI 22.15 kg/m²     CONSTITUTIONAL: well , well nourished, appears age appropriate  EYES: perrla, eom intact  ENMT:moist mucous membranes, pharynx clear  NECK: supple. Thyroid normal  RESPIRATORY: Chest: clear bilaterally   CARDIOVASCULAR: Heart: regular rate and rhythm  GASTROINTESTINAL: Abdomen: soft, bowel sounds active  HEMATOLOGIC: no pathological lymph nodes palpated  MUSCULOSKELETAL: Extremities: no edema, pulse 1+   INTEGUMENT: No unusual rashes or suspicious skin lesions noted. Nails appear normal.  NEUROLOGIC: non-focal exam   MENTAL STATUS: alert and oriented, appropriate affect           ASSESSMENT:  1. Dyslipidemia    2. Fibromyalgia    3. Fatigue, unspecified type    4. Tension-type headache, not intractable, unspecified chronicity pattern    5. Prediabetes    6.  Screen for colon cancer      Patient's persistent cough and facial discomfort raise the question of a sinusitis and we will give her a Z-Bebo. There is no humidity in her system and therefore will encourage water intake, saline nasal spray and either a humidifier or vaporizer when going to sleep at night. We also gave her some Mucinex. If this does not resolve the problem she will contact us. Known history of dyslipidemia, for which we will check her cholesterol today. Fibromyalgia and fatigue is relatively quiet. She is under a great deal of stress she tells me. Fortunately, headaches are not an issue. History of prediabetes, for which we will check hemoglobin A1c. She has never been screened for colon cancer. Will ask her to see GI for a colonoscopy. She is agreeable to the plan and will see us in four to six months or as needed. I have discussed the diagnosis with the patient and the intended plan as seen in the  orders above. The patient understands and agees with the plan. The patient has   received an after visit summary and questions were answered concerning  future plans  Patient labs and/or xrays were reviewed  Past records were reviewed. PLAN:  .  Orders Placed This Encounter    URINALYSIS W/ RFLX MICROSCOPIC    METABOLIC PANEL, COMPREHENSIVE    CBC WITH AUTOMATED DIFF    LIPID PANEL    TSH 3RD GENERATION    HEMOGLOBIN A1C WITH EAG    REFERRAL FOR COLONOSCOPY    guaiFENesin ER (MUCINEX) 600 mg ER tablet    sodium chloride (OCEAN) 0.65 % nasal squeeze bottle    azithromycin (ZITHROMAX) 250 mg tablet       Follow-up and Dispositions    · Return in about 4 months (around 5/2/2020). ATTENTION:   This medical record was transcribed using an electronic medical records system. Although proofread, it may and can contain electronic and spelling errors. Other human spelling and other errors may be present. Corrections may be executed at a later time. Please feel free to contact us for any clarifications as needed.

## 2024-02-09 NOTE — PHYSICIAN QUERY
PT Name: Eber Wilson  MR #: [id number]    DOCUMENTATION CLARIFICATION      CDS/: Ericka Delvalle RN               Contact information:  [email or web address]    This form is a permanent document in the medical record.  Query Date: February 9, 2024    By submitting this query, we are merely seeking further clarification of documentation. Please utilize your independent clinical judgment when addressing the question(s) below.    The Medical Record contains the following:   Indicators   Supporting Clinical Findings Location in Medical Record   x MOTLEY, SOB, Productive Cough, Wheezing,   Use of Accessory Muscles, etc. Cardiovascular:  Negative for chest pain, claudication, cyanosis, dyspnea on exertion, irregular heartbeat, leg swelling, near-syncope, orthopnea, palpitations, paroxysmal nocturnal dyspnea and syncope.  Respiratory:  Positive for shortness of breath.    Respiratory:  Negative for cough, shortness of breath and wheezing. Cards Consult 2/3          H&P 2/3     x Radiology Findings CXR:  FINDINGS:  Lungs are clear. No focal consolidation. No pleural effusion. No pneumothorax. Normal heart size.  Impression:  No acute findings. CXR 2/3                Hypoxia or Hypercapnia     x Respiratory Distress or Failure Pulmonary/Chest: No respiratory distress. He has no wheezes. He has rhonchi. He has no rales. He exhibits no tenderness.   ED Prov Note 2/3   x Smoker/History of Smoking Current daily smoker   Cards Consult 2/3    RR              ABGs                     O2 sat Vital Signs (24h Range):  Resp:  [19-29] 22  SpO2:  [92 %-97 %] 95 %      x Medication Arformoterol nebulizer 15 mcg BID  Budesonide nebulizer 0.5 mg Q 12 hrs   MAR 2/3-2/4  MAR 2/3-2/4    Treatment      Home O2, Oxygen Dependence      Supplemental O2     x Other COPD exacerbation  Patient's COPD is controlled currently.  Patient is currently off COPD Pathway. Continue scheduled inhalers  duonebs prn, Steroids, and Supplemental oxygen and  monitor respiratory status closely. H&P 2/3               Provider, please clarify  the diagnosis  associated with above clinical findings.    [  x ] COPD with Acute Exacerbation     [   ] COPD without Acute Exacerbation     [   ] Other Respiratory Diagnosis (please specify):____________________         Please document in your progress notes daily for the duration of treatment, until resolved, and include in your discharge summary.

## 2024-02-14 NOTE — HOSPITAL COURSE
Pt admitted with NSTEMI and underwent [facility]--    PCI 99% mid LAD tx w Synergy BARAK 2.5 x 28 mm x one (posted to 3.0 mm).  60 - 70% ostial Ramus: iFR 0.86  50% ectatic mid RCA.  EF 50% w apical WMA.  LVEDP 31    -Echo showed 50% with RWMA     No further pain/SOB  Stressed importance of remaining on prescribed meds including ASA, atorvastatin, isosorbide, metoprolol, Prasurgel. Given script for SL NTG.  Follow up with cardiology and PCP  Stop smoking    Pt seen and examined on day of dc and stable for dc home.

## 2024-02-14 NOTE — DISCHARGE SUMMARY
[facility] - Telemetry ([facility] Medicine  Discharge Summary      Patient Name: Eber Wilson  MRN: [id number]  [facility]: [id number]  Patient Class: IP- Inpatient  Admission Date: 2/3/2024  Hospital Length of Stay: 1 days  Discharge Date and Time: 2/4/2024  4:52 PM  Attending Physician: No att. providers found   Discharging Provider: Gabi Alford MD  Primary Care Provider: Michael Lopez FNP-C    Primary Care Team: [facility] MEDICINE C    HPI:   Eber Wilson is a 69 y.o. male with a PMH  has a past medical history of Anxiety, COPD (chronic obstructive pulmonary disease), Diverticular disease, and Hypertension.  Presents as a transfer from our [facility] facility for NSTEMI and Cardiology consult.  Patient reports he was taking groceries out in his vehicle around 1900 last night when he started having chest discomfort in bilateral arm pain.  Symptoms lasted for a few moments and resolved on its own after he laid down.  Around 2200 patient experienced the same pain while lying down getting ready for bed.  Again, symptoms lasted momentarily resolved after taking 2 baby aspirin.  Patient denies personal history of heart disease, but states that his brother in both his parents suffered from cardiac issues including MI and open heart surgery.  Patient currently asymptomatic and denies any other associated symptoms.    ER workup revealed CBG of 177 mg/dL, initial troponin of 0.4 a 2 with delta troponin of 0.932.  Chest x-ray with no acute findings.  Initial EKG showed sinus rhythm with PACs with a ventricular rate of 90 beats per minute and a QT/QTC of 364/445.  Repeat EKG roughly 1 hour later showed sinus rhythm with marked sinus arrhythmia and left axis deviation with ventricular rate of 86 beats per minute and a QT/QTC of 360/430.  On-call cardiologist consulted (Dr. Alfaro).  Patient initiated on heparin drip. Patient also received 1 g of Tylenol, 162 mg aspirin, 1 mg of Ativan, 50 mg metoprolol 2 mg  of morphine, and 1 in of nitroglycerin paste. Hospital Medicine consulted to admit patient for NSTEMI and Cardiology evaluation.  Patient was transferred from ED to ED due to no beds available on floor.  Patient admitted under inpatient status.    PCP: Michael Lopez      Procedure(s) (LRB):  Left heart cath (Left)  Percutaneous coronary intervention (N/A)  INSERTION, STENT, CORONARY ARTERY (N/A)  Instantaneous Wave-Free Ratio (IFR) (N/A)      Hospital Course:   Pt admitted with NSTEMI and underwent C--    PCI 99% mid LAD tx w Synergy BARAK 2.5 x 28 mm x one (posted to 3.0 mm).  60 - 70% ostial Ramus: iFR 0.86  50% ectatic mid RCA.  EF 50% w apical WMA.  LVEDP 31    -Echo showed 50% with RWMA     No further pain/SOB  Stressed importance of remaining on prescribed meds including ASA, atorvastatin, isosorbide, metoprolol, Prasurgel. Given script for SL NTG.  Follow up with cardiology and PCP  Stop smoking    Pt seen and examined on day of dc and stable for dc home.      Goals of Care Treatment Preferences:  Code Status: Full Code      Consults:   Consults (From admission, onward)          Status Ordering Provider     Inpatient consult to Registered Dietitian/Nutritionist  Once        Provider:  (Not yet assigned)    Completed EDWIN CHAUHAN     Inpatient consult to Social Work/Case Management  Once        Provider:  (Not yet assigned)    Completed EDWIN CHAUHAN     Inpatient consult to Cardiology  Once        Provider:  Wilmar Alfaro MD    Completed YARED COYNE            Pulmonary  COPD exacerbation  Patient's COPD is controlled currently.  Patient is currently off COPD Pathway. Continue scheduled inhalers  duonebs prn, Steroids, and Supplemental oxygen and monitor respiratory status closely.     Cardiac/Vascular  * NSTEMI (non-ST elevated myocardial infarction)  Patient presents with NSTEMI. Chest pain is currently controlled. LALA score is 4. Patient is currently on NSTEMI Pathway.    EKG  reviewed. Troponins reviewed and results noted  .     Lipid panel reviewed and shows-     Lab Results   Component Value Date    LDLCALC 126.0 02/03/2024     Lab Results   Component Value Date    TRIG 210 (H) 02/03/2024         Medical management includes; Beta Blocker, Anticoagulation, and Nitrate Echo.   Consult for cardiac rehab is ordered. Patient counseled on lifestyle modifications- quit smoking, follow a low fat, low cholesterol diet, attempt to lose weight, reduce salt in diet and cooking, reduce exposure to stress, improve dietary compliance, continue current medications, and return for routine annual checkups. Cardiology is consulted. Plan of care reviewed with cardiology team. Continue to monitor patient closely and adjust therapy as needed.        Coronary artery disease involving native coronary artery of native heart with unstable angina pectoris  Patient with known CAD s/p stent placemenwhich is controlled Will continue ASA, Plavix, and Statin and monitor for S/Sx of angina/ACS. Continue to monitor on telemetry.     HTN (hypertension)  Chronic, controlled. Latest blood pressure and vitals reviewed-      .   Home meds for hypertension were reviewed and noted below.   Hypertension Medications             While in the hospital, will manage blood pressure as follows; Continue home antihypertensive regimen    Will utilize p.r.n. blood pressure medication only if patient's blood pressure greater than 160/100 and he develops symptoms such as worsening chest pain or shortness of breath.    Elevated troponin  See above NSTEMI      Other  Anxiety  Chronic. Stable. Not in acute exacerbation and currently denies endorsing any suicidal/homicidal ideations.   Plan:  -Continue home medications (xanax)        Tobacco abuse  Patient reports smoking approximately 1.5 pack of cigarettes daily with no thoughts of cutting back currently. Patient educated on morbidity and mortality in regards to continuation of smoking and  stressed importance of cessation. Patient currently declined nicotine patch at time of admission but will be available at patient's request.  Plan:  -Nicotine patch available at patient's request            Final Active Diagnoses:    Diagnosis Date Noted POA    PRINCIPAL PROBLEM:  NSTEMI (non-ST elevated myocardial infarction) [I21.4] 02/03/2024 Yes    Elevated troponin [R79.89] 02/03/2024 Yes    HTN (hypertension) [I10] 02/03/2024 Yes    Coronary artery disease involving native coronary artery of native heart with unstable angina pectoris [I25.110] 02/03/2024 Yes    Tobacco abuse [Z72.0] 06/03/2017 Yes    Anxiety [F41.9] 06/03/2017 Yes    COPD exacerbation [J44.1] 06/02/2017 No      Problems Resolved During this Admission:       Discharged Condition: fair    Disposition: Home or Self Care    Follow Up:   Follow-up Information       Michael Lopez FNP-C Follow up in 3 day(s).    Specialty: Family Medicine  Contact information:  [address]  [place] [address]  [phone]               Nilo Chavira MD Follow up in 2 week(s).    Specialties: Interventional Cardiology, Cardiology  Contact information:  [address]  [place] [address] [phone]                           Patient Instructions:      Ambulatory referral/consult to Smoking Cessation Program   Standing Status: Future   Referral Priority: Routine Referral Type: Consultation   Referral Reason: Specialty Services Required   Requested Specialty: CTTS   Number of Visits Requested: 1     Ambulatory referral/consult to Cardiology   Standing Status: Future   Referral Priority: Routine Referral Type: Consultation   Referral Reason: Specialty Services Required   Requested Specialty: Cardiology   Number of Visits Requested: 1     Cardiac rehab phase II   Standing Status: Future Standing Exp. Date: 02/03/25     Order Specific Question Answer Comments   Department ON CARDIAC REHAB    Select qualifying diagnosis: I21.4 - Non-ST elevation  (NSTEMI) myocardial infarction      REASON FOR NOT PRESCRIBING ANTIPLATELET MEDICATION AT DISCHARGE   Order Comments: ALREADY PRESCRIBED     Order Specific Question Answer Comments   Reason for not Prescribing: Other (Comment)      Imaging Results              X-Ray Chest AP Portable (Final result)  Result time 02/03/24 01:47:44      Final result by Rupesh Henderson MD (02/03/24 01:47:44)                   Impression:      No acute findings.      Electronically signed by: Rupesh Henderson  Date:    02/03/2024  Time:    01:47               Narrative:    EXAMINATION:  XR CHEST AP PORTABLE    CLINICAL HISTORY:  Shortness of breath    TECHNIQUE:  Single frontal view of the chest was performed.    COMPARISON:  01/21/2020    FINDINGS:  Lungs are clear. No focal consolidation. No pleural effusion. No pneumothorax. Normal heart size.                        Wet Read by Juwan Jose MD (02/03/24 01:06:09, [facility], Emergency Medicine)    NAF                                    Significant Diagnostic Studies: Labs: All labs within the past 24 hours have been reviewed    Pending Diagnostic Studies:       Procedure Component Value Units Date/Time    EKG 12-lead [[id number]]     Order Status: Sent Lab Status: No result            Medications:  Reconciled Home Medications:      Medication List        START taking these medications      isosorbide mononitrate 30 MG 24 hr tablet  Commonly known as: IMDUR  Take 1 tablet (30 mg total) by mouth once daily.     metoprolol succinate 50 MG 24 hr tablet  Commonly known as: TOPROL-XL  Take 0.5 tablets (25 mg total) by mouth once daily.     nicotine 21 mg/24 hr  Commonly known as: NICODERM CQ  Place 1 patch onto the skin once daily. for 21 days     nitroGLYCERIN 0.4 MG SL tablet  Commonly known as: NITROSTAT  Place 1 tablet (0.4 mg total) under the tongue every 5 (five) minutes as needed for Chest pain (angina).     prasugreL 10 mg Tab  Commonly known as:  EFFIENT  Take 1 tablet (10 mg total) by mouth once daily.            CHANGE how you take these medications      * aspirin 81 MG EC tablet  Commonly known as: ECOTRIN  Take 1 tablet by mouth once daily.  What changed: Another medication with the same name was added. Make sure you understand how and when to take each.     * aspirin 81 MG Chew  Take 1 tablet (81 mg total) by mouth once daily.  What changed: You were already taking a medication with the same name, and this prescription was added. Make sure you understand how and when to take each.     atorvastatin 80 MG tablet  Commonly known as: LIPITOR  Take 1 tablet (80 mg total) by mouth once daily.  What changed:   medication strength  how much to take  when to take this     lisinopriL-hydrochlorothiazide 20-12.5 mg per tablet  Commonly known as: PRINZIDE,ZESTORETIC  Take 1 tablet by mouth once daily. HOLD UNTIL SEEN BY MD  What changed: additional instructions           * This list has 2 medication(s) that are the same as other medications prescribed for you. Read the directions carefully, and ask your doctor or other care provider to review them with you.                CONTINUE taking these medications      albuterol 90 mcg/actuation inhaler  Commonly known as: PROVENTIL/VENTOLIN HFA  Inhale 2 puffs into the lungs every 6 (six) hours as needed for Wheezing. Rescue     ALPRAZolam 0.25 MG tablet  Commonly known as: XANAX  Take 0.25 mg by mouth.     fluticasone-salmeterol 250-50 mcg/dose 250-50 mcg/dose diskus inhaler  Commonly known as: ADVAIR  Inhale 1 puff into the lungs 2 (two) times a day.     meclizine 25 mg tablet  Commonly known as: ANTIVERT  Take 25 mg by mouth 2 (two) times daily as needed.     pantoprazole 20 MG tablet  Commonly known as: PROTONIX  Take 20 mg by mouth every morning.              Indwelling Lines/Drains at time of discharge:   Lines/Drains/Airways       None                   Time spent on the discharge of patient: 41 minutes          Gabi Alford MD  Department of Hospital Medicine  '[place] - Telemetry ([facility])

## 2024-02-14 NOTE — ASSESSMENT & PLAN NOTE
Patient with known CAD s/p stent placemenwhich is controlled Will continue ASA, Plavix, and Statin and monitor for S/Sx of angina/ACS. Continue to monitor on telemetry.

## 2024-02-14 NOTE — ASSESSMENT & PLAN NOTE
Chronic, controlled. Latest blood pressure and vitals reviewed-      .   Home meds for hypertension were reviewed and noted below.   Hypertension Medications             While in the hospital, will manage blood pressure as follows; Continue home antihypertensive regimen    Will utilize p.r.n. blood pressure medication only if patient's blood pressure greater than 160/100 and he develops symptoms such as worsening chest pain or shortness of breath.

## 2024-02-14 NOTE — ASSESSMENT & PLAN NOTE
Patient presents with NSTEMI. Chest pain is currently controlled. LALA score is 4. Patient is currently on NSTEMI Pathway.    EKG reviewed. Troponins reviewed and results noted  .     Lipid panel reviewed and shows-     Lab Results   Component Value Date    LDLCALC 126.0 02/03/2024     Lab Results   Component Value Date    TRIG 210 (H) 02/03/2024         Medical management includes; Beta Blocker, Anticoagulation, and Nitrate Echo.   Consult for cardiac rehab is ordered. Patient counseled on lifestyle modifications- quit smoking, follow a low fat, low cholesterol diet, attempt to lose weight, reduce salt in diet and cooking, reduce exposure to stress, improve dietary compliance, continue current medications, and return for routine annual checkups. Cardiology is consulted. Plan of care reviewed with cardiology team. Continue to monitor patient closely and adjust therapy as needed.

## 2024-02-15 ENCOUNTER — OFFICE VISIT (OUTPATIENT)
Dept: CARDIOLOGY | Facility: CLINIC | Age: 69
End: 2024-02-15
Payer: MEDICARE

## 2024-02-15 VITALS
HEART RATE: 83 BPM | BODY MASS INDEX: 33.08 KG/M2 | DIASTOLIC BLOOD PRESSURE: 64 MMHG | OXYGEN SATURATION: 95 % | HEIGHT: 68 IN | WEIGHT: 218.25 LBS | SYSTOLIC BLOOD PRESSURE: 102 MMHG

## 2024-02-15 DIAGNOSIS — Z72.0 TOBACCO ABUSE: ICD-10-CM

## 2024-02-15 DIAGNOSIS — I21.4 NSTEMI (NON-ST ELEVATED MYOCARDIAL INFARCTION): Primary | ICD-10-CM

## 2024-02-15 DIAGNOSIS — I25.110 CORONARY ARTERY DISEASE INVOLVING NATIVE CORONARY ARTERY OF NATIVE HEART WITH UNSTABLE ANGINA PECTORIS: ICD-10-CM

## 2024-02-15 DIAGNOSIS — I10 HYPERTENSION, UNSPECIFIED TYPE: ICD-10-CM

## 2024-02-15 PROCEDURE — 3044F HG A1C LEVEL LT 7.0%: CPT | Mod: CPTII,S$GLB,,

## 2024-02-15 PROCEDURE — 1111F DSCHRG MED/CURRENT MED MERGE: CPT | Mod: CPTII,S$GLB,,

## 2024-02-15 PROCEDURE — 1159F MED LIST DOCD IN RCRD: CPT | Mod: CPTII,S$GLB,,

## 2024-02-15 PROCEDURE — 1160F RVW MEDS BY RX/DR IN RCRD: CPT | Mod: CPTII,S$GLB,,

## 2024-02-15 PROCEDURE — 3288F FALL RISK ASSESSMENT DOCD: CPT | Mod: CPTII,S$GLB,,

## 2024-02-15 PROCEDURE — 1101F PT FALLS ASSESS-DOCD LE1/YR: CPT | Mod: CPTII,S$GLB,,

## 2024-02-15 PROCEDURE — 99214 OFFICE O/P EST MOD 30 MIN: CPT | Mod: S$GLB,,,

## 2024-02-15 PROCEDURE — 3078F DIAST BP <80 MM HG: CPT | Mod: CPTII,S$GLB,,

## 2024-02-15 PROCEDURE — 3074F SYST BP LT 130 MM HG: CPT | Mod: CPTII,S$GLB,,

## 2024-02-15 PROCEDURE — 4010F ACE/ARB THERAPY RXD/TAKEN: CPT | Mod: CPTII,S$GLB,,

## 2024-02-15 PROCEDURE — 99999 PR PBB SHADOW E&M-EST. PATIENT-LVL III: CPT | Mod: PBBFAC,,,

## 2024-02-15 PROCEDURE — 3008F BODY MASS INDEX DOCD: CPT | Mod: CPTII,S$GLB,,

## 2024-02-15 PROCEDURE — 1126F AMNT PAIN NOTED NONE PRSNT: CPT | Mod: CPTII,S$GLB,,

## 2024-02-15 RX ORDER — LOSARTAN POTASSIUM 25 MG/1
25 TABLET ORAL DAILY
Qty: 90 TABLET | Refills: 3 | Status: SHIPPED | OUTPATIENT
Start: 2024-02-15 | End: 2025-02-14

## 2024-02-15 RX ORDER — ATORVASTATIN CALCIUM 80 MG/1
80 TABLET, FILM COATED ORAL DAILY
Qty: 90 TABLET | Refills: 3 | Status: SHIPPED | OUTPATIENT
Start: 2024-02-15 | End: 2024-04-29 | Stop reason: SDUPTHER

## 2024-02-15 RX ORDER — NAPROXEN SODIUM 220 MG/1
81 TABLET, FILM COATED ORAL DAILY
Qty: 90 TABLET | Refills: 3 | Status: SHIPPED | OUTPATIENT
Start: 2024-02-15 | End: 2025-02-14

## 2024-02-15 RX ORDER — PRASUGREL 10 MG/1
10 TABLET, FILM COATED ORAL DAILY
Qty: 90 TABLET | Refills: 3 | Status: SHIPPED | OUTPATIENT
Start: 2024-02-15 | End: 2025-02-09

## 2024-02-15 RX ORDER — ISOSORBIDE MONONITRATE 30 MG/1
30 TABLET, EXTENDED RELEASE ORAL DAILY
Qty: 90 TABLET | Refills: 3 | Status: SHIPPED | OUTPATIENT
Start: 2024-02-15 | End: 2024-04-29 | Stop reason: SDUPTHER

## 2024-02-15 RX ORDER — METOPROLOL SUCCINATE 50 MG/1
25 TABLET, EXTENDED RELEASE ORAL DAILY
Qty: 90 TABLET | Refills: 3 | Status: SHIPPED | OUTPATIENT
Start: 2024-02-15 | End: 2024-04-29 | Stop reason: SDUPTHER

## 2024-02-15 NOTE — PROGRESS NOTES
Subjective:   Patient ID:  Eber Wilson is a 69 y.o. male who presents for evaluation of No chief complaint on file.      HPI 70y/o M with PMHx of COPD, HTN, anxiety, tobacco abuse followed by Dr. Alfaro in cards clinic. Here today for HFU, seen at [facility] ED for CP with associated SOB, troponin found to be 6.2. Underwent [facility] for NSTEMI with PCI LAD. Here today for f/u doing well, [facility] site C/D/I. Unsure if he is taking the lisinopril-HCTZ BP today 102/64, compliant with all other medications. No bleeding sxs on blood thinners, cut back on smoking. Denies any CP, SOB, dizziness, palpitations. Feels anxious for appt today      [facility] 2/3/24  PCI 99% mid LAD tx w Synergy BARAK 2.5 x 28 mm x one (posted to 3.0 mm).  60 - 70% ostial Ramus: iFR 0.86  50% ectatic mid RCA.  EF 50% w apical WMA.  Past Medical History:   Diagnosis Date    Anxiety     COPD (chronic obstructive pulmonary disease)     Diverticular disease     Hypertension        Past Surgical History:   Procedure Laterality Date    COLOSTOMY CLOSURE      CORONARY STENT PLACEMENT N/A 2/3/2024    Procedure: INSERTION, STENT, CORONARY ARTERY;  Surgeon: Nilo Chavira MD;  Location: [facility] CATH LAB;  Service: Cardiology;  Laterality: N/A;    HERNIA REPAIR      INSTANTANEOUS WAVE-FREE RATIO (IFR) N/A 2/3/2024    Procedure: Instantaneous Wave-Free Ratio (IFR);  Surgeon: Nilo Chavira MD;  Location: [facility] CATH LAB;  Service: Cardiology;  Laterality: N/A;    LAPAROSCOPIC COLOSTOMY      LEFT HEART CATHETERIZATION Left 2/3/2024    Procedure: Left heart cath;  Surgeon: Nilo Chavira MD;  Location: [facility] CATH LAB;  Service: Cardiology;  Laterality: Left;    PERCUTANEOUS CORONARY INTERVENTION, ARTERY N/A 2/3/2024    Procedure: Percutaneous coronary intervention;  Surgeon: Nilo Chavira MD;  Location: [facility] CATH LAB;  Service: Cardiology;  Laterality: N/A;       Social History     Tobacco Use    Smoking status: Every Day     Current packs/day: 1.50     Average packs/day:  1.5 packs/day for 20.0 years (30.0 ttl pk-yrs)     Types: Cigarettes     Start date: 2/3/2004    Smokeless tobacco: Never   Substance Use Topics    Alcohol use: No    Drug use: No       Family History   Problem Relation Age of Onset    COPD Mother     Heart attack Mother     COPD Father     Heart attack Father        Current Outpatient Medications on File Prior to Visit   Medication Sig Dispense Refill    albuterol (PROVENTIL/VENTOLIN HFA) 90 mcg/actuation inhaler Inhale 2 puffs into the lungs every 6 (six) hours as needed for Wheezing. Rescue      ALPRAZolam (XANAX) 0.25 MG tablet Take 0.25 mg by mouth.      aspirin (ECOTRIN) 81 MG EC tablet Take 1 tablet by mouth once daily.      aspirin 81 MG Chew Take 1 tablet (81 mg total) by mouth once daily. 360 tablet 0    atorvastatin (LIPITOR) 80 MG tablet Take 1 tablet (80 mg total) by mouth once daily. 90 tablet 0    fluticasone-salmeterol diskus inhaler 250-50 mcg Inhale 1 puff into the lungs 2 (two) times a day.      isosorbide mononitrate (IMDUR) 30 MG 24 hr tablet Take 1 tablet (30 mg total) by mouth once daily. 90 tablet 0    lisinopriL-hydrochlorothiazide (PRINZIDE,ZESTORETIC) 20-12.5 mg per tablet Take 1 tablet by mouth once daily. HOLD UNTIL SEEN BY MD stoutne (ANTIVERT) 25 mg tablet Take 25 mg by mouth 2 (two) times daily as needed.      metoprolol succinate (TOPROL-XL) 50 MG 24 hr tablet Take 0.5 tablets (25 mg total) by mouth once daily. 45 tablet 0    nicotine (NICODERM CQ) 21 mg/24 hr Place 1 patch onto the skin once daily. for 21 days 21 patch 0    nitroGLYCERIN (NITROSTAT) 0.4 MG SL tablet Place 1 tablet (0.4 mg total) under the tongue every 5 (five) minutes as needed for Chest pain (angina). 25 tablet PRN    pantoprazole (PROTONIX) 20 MG tablet Take 20 mg by mouth every morning.      prasugreL (EFFIENT) 10 mg Tab Take 1 tablet (10 mg total) by mouth once daily. 30 tablet 0     No current facility-administered medications on file prior to visit.       Wt Readings from Last 3 Encounters:   02/03/24 97.1 kg (214 lb)   07/03/23 95.2 kg (209 lb 14.1 oz)   12/19/21 103 kg (227 lb 1.2 oz)     Temp Readings from Last 3 Encounters:   02/04/24 97.7 °F (36.5 °C) (Oral)   07/03/23 98.6 °F (37 °C) (Oral)   12/19/21 97.9 °F (36.6 °C) (Oral)     BP Readings from Last 3 Encounters:   02/04/24 (!) 122/93   07/03/23 (!) 160/84   12/19/21 (!) 163/92     Pulse Readings from Last 3 Encounters:   02/04/24 71   07/03/23 72   12/19/21 86        Review of Systems   Constitutional: Negative.   HENT: Negative.     Eyes: Negative.    Cardiovascular: Negative.    Respiratory: Negative.     Skin: Negative.    Musculoskeletal: Negative.    Gastrointestinal: Negative.    Genitourinary: Negative.    Neurological: Negative.    Psychiatric/Behavioral:  The patient is nervous/anxious.        Objective:   Physical Exam  Vitals and nursing note reviewed.   Constitutional:       Appearance: Normal appearance.   HENT:      Head: Normocephalic and atraumatic.   Eyes:      General:         Right eye: No discharge.         Left eye: No discharge.      Pupils: Pupils are equal, round, and reactive to light.   Cardiovascular:      Rate and Rhythm: Normal rate and regular rhythm.      Heart sounds: S1 normal and S2 normal. No murmur heard.     No friction rub.   Pulmonary:      Effort: Pulmonary effort is normal. No respiratory distress.      Breath sounds: Normal breath sounds. No rales.   Abdominal:      Palpations: Abdomen is soft.      Tenderness: There is no abdominal tenderness.   Musculoskeletal:      Cervical back: Neck supple.      Right lower leg: No edema.      Left lower leg: No edema.   Skin:     General: Skin is warm and dry.   Neurological:      General: No focal deficit present.      Mental Status: He is alert and oriented to person, place, and time.   Psychiatric:         Mood and Affect: Mood normal.         Behavior: Behavior normal.         Thought Content: Thought content normal.  "        Lab Results   Component Value Date    CHOL 198 02/03/2024     Lab Results   Component Value Date    HDL 30 (L) 02/03/2024     Lab Results   Component Value Date    LDLCALC 126.0 02/03/2024     Lab Results   Component Value Date    TRIG 210 (H) 02/03/2024     Lab Results   Component Value Date    CHOLHDL 15.2 (L) 02/03/2024       Chemistry        Component Value Date/Time     02/04/2024 0511    K 4.3 02/04/2024 0511     02/04/2024 0511    CO2 23 02/04/2024 0511    BUN 13 02/04/2024 0511    CREATININE 1.2 02/04/2024 0511    GLU 97 02/04/2024 0511        Component Value Date/Time    CALCIUM 8.5 (L) 02/04/2024 0511    ALKPHOS 58 02/04/2024 0511    AST 63 (H) 02/04/2024 0511    ALT 41 02/04/2024 0511    BILITOT 0.8 02/04/2024 0511    ESTGFRAFRICA >60.0 12/19/2021 1403    EGFRNONAA 56.9 (A) 12/19/2021 1403          No results found for: "TSH"  Lab Results   Component Value Date    INR 0.9 02/03/2024     @RESUFAST(WBC,HGB,HCT,MCV,PLT)  @LABRCNTIP(BNP,BNPTRIAGEBLO)@  CrCl cannot be calculated (Patient's most recent lab result is older than the maximum 7 days allowed.).     Results for orders placed during the hospital encounter of 02/03/24    Echo    Interpretation Summary    Left Ventricle: The left ventricle is normal in size. Normal wall thickness. There is concentric remodeling. Regional wall motion abnormalities present. There is low normal systolic function with a visually estimated ejection fraction of 50 - 55%. Ejection fraction by visual approximation is 50%. There is normal diastolic function.    Right Ventricle: Normal right ventricular cavity size. Wall thickness is normal. Right ventricle wall motion  is normal. Systolic function is normal.    Pulmonary Artery: The estimated pulmonary artery systolic pressure is 14 mmHg.    IVC/SVC: Normal venous pressure at 3 mmHg.     No results found for this or any previous visit.     Assessment:      1. NSTEMI (non-ST elevated myocardial infarction)  "   2. Hypertension, unspecified type    3. Coronary artery disease involving native coronary artery of native heart with unstable angina pectoris    4. Tobacco abuse        Plan:   NSTEMI (non-ST elevated myocardial infarction)    Hypertension, unspecified type    Coronary artery disease involving native coronary artery of native heart with unstable angina pectoris    Tobacco abuse      Switch to losartan, monitor daily weights and BP trend  Discussed importance of medication compliance  RF modifications  Daily exercise as tolerated  Low Na low fat diet    RTC in April or sooner if needed    Courtney Guillot, FNP-C [facility], Cardiology

## 2024-02-26 ENCOUNTER — TELEPHONE (OUTPATIENT)
Dept: CARDIOLOGY | Facility: CLINIC | Age: 69
End: 2024-02-26
Payer: MEDICARE

## 2024-02-26 LAB
POC ACTIVATED CLOTTING TIME K: 249 SEC (ref 74–137)
POC ACTIVATED CLOTTING TIME K: 261 SEC (ref 74–137)
POC ACTIVATED CLOTTING TIME K: 309 SEC (ref 74–137)
SAMPLE: ABNORMAL

## 2024-02-26 NOTE — TELEPHONE ENCOUNTER
"Called pt and pt's kalee back (Jennifer). They are wanting to know if pt had a " maker" heart attack. Informed them I would find out and call back. They vu w/o q/c      ----- Message from Samia Echeverria sent at 2/26/2024  9:24 AM CST -----  Contact: Jennifer/Kalee  Pt Kalee is calling in regards to the pt procedure.please call back at [phone]    Thanks  CWJ    "

## 2024-04-05 ENCOUNTER — OFFICE VISIT (OUTPATIENT)
Dept: CARDIOLOGY | Facility: CLINIC | Age: 69
End: 2024-04-05
Payer: MEDICARE

## 2024-04-05 VITALS
WEIGHT: 225.06 LBS | SYSTOLIC BLOOD PRESSURE: 120 MMHG | OXYGEN SATURATION: 95 % | HEIGHT: 68 IN | DIASTOLIC BLOOD PRESSURE: 82 MMHG | HEART RATE: 74 BPM | BODY MASS INDEX: 34.11 KG/M2

## 2024-04-05 DIAGNOSIS — I25.2 HISTORY OF NON-ST ELEVATION MYOCARDIAL INFARCTION (NSTEMI): ICD-10-CM

## 2024-04-05 DIAGNOSIS — I10 HYPERTENSION, UNSPECIFIED TYPE: Primary | ICD-10-CM

## 2024-04-05 DIAGNOSIS — R06.02 SOB (SHORTNESS OF BREATH): ICD-10-CM

## 2024-04-05 DIAGNOSIS — Z72.0 TOBACCO ABUSE: ICD-10-CM

## 2024-04-05 DIAGNOSIS — I25.110 CORONARY ARTERY DISEASE INVOLVING NATIVE CORONARY ARTERY OF NATIVE HEART WITH UNSTABLE ANGINA PECTORIS: ICD-10-CM

## 2024-04-05 PROCEDURE — 3288F FALL RISK ASSESSMENT DOCD: CPT | Mod: CPTII,S$GLB,, | Performed by: STUDENT IN AN ORGANIZED HEALTH CARE EDUCATION/TRAINING PROGRAM

## 2024-04-05 PROCEDURE — 99999 PR PBB SHADOW E&M-EST. PATIENT-LVL III: CPT | Mod: PBBFAC,,, | Performed by: STUDENT IN AN ORGANIZED HEALTH CARE EDUCATION/TRAINING PROGRAM

## 2024-04-05 PROCEDURE — 1159F MED LIST DOCD IN RCRD: CPT | Mod: CPTII,S$GLB,, | Performed by: STUDENT IN AN ORGANIZED HEALTH CARE EDUCATION/TRAINING PROGRAM

## 2024-04-05 PROCEDURE — 3079F DIAST BP 80-89 MM HG: CPT | Mod: CPTII,S$GLB,, | Performed by: STUDENT IN AN ORGANIZED HEALTH CARE EDUCATION/TRAINING PROGRAM

## 2024-04-05 PROCEDURE — 3044F HG A1C LEVEL LT 7.0%: CPT | Mod: CPTII,S$GLB,, | Performed by: STUDENT IN AN ORGANIZED HEALTH CARE EDUCATION/TRAINING PROGRAM

## 2024-04-05 PROCEDURE — 3074F SYST BP LT 130 MM HG: CPT | Mod: CPTII,S$GLB,, | Performed by: STUDENT IN AN ORGANIZED HEALTH CARE EDUCATION/TRAINING PROGRAM

## 2024-04-05 PROCEDURE — 3008F BODY MASS INDEX DOCD: CPT | Mod: CPTII,S$GLB,, | Performed by: STUDENT IN AN ORGANIZED HEALTH CARE EDUCATION/TRAINING PROGRAM

## 2024-04-05 PROCEDURE — 1101F PT FALLS ASSESS-DOCD LE1/YR: CPT | Mod: CPTII,S$GLB,, | Performed by: STUDENT IN AN ORGANIZED HEALTH CARE EDUCATION/TRAINING PROGRAM

## 2024-04-05 PROCEDURE — 99214 OFFICE O/P EST MOD 30 MIN: CPT | Mod: S$GLB,,, | Performed by: STUDENT IN AN ORGANIZED HEALTH CARE EDUCATION/TRAINING PROGRAM

## 2024-04-05 PROCEDURE — 4010F ACE/ARB THERAPY RXD/TAKEN: CPT | Mod: CPTII,S$GLB,, | Performed by: STUDENT IN AN ORGANIZED HEALTH CARE EDUCATION/TRAINING PROGRAM

## 2024-04-05 PROCEDURE — 1126F AMNT PAIN NOTED NONE PRSNT: CPT | Mod: CPTII,S$GLB,, | Performed by: STUDENT IN AN ORGANIZED HEALTH CARE EDUCATION/TRAINING PROGRAM

## 2024-04-29 DIAGNOSIS — I10 HYPERTENSION, UNSPECIFIED TYPE: ICD-10-CM

## 2024-04-29 DIAGNOSIS — I21.4 NSTEMI (NON-ST ELEVATED MYOCARDIAL INFARCTION): ICD-10-CM

## 2024-04-29 DIAGNOSIS — I25.110 CORONARY ARTERY DISEASE INVOLVING NATIVE CORONARY ARTERY OF NATIVE HEART WITH UNSTABLE ANGINA PECTORIS: ICD-10-CM

## 2024-04-29 RX ORDER — METOPROLOL SUCCINATE 50 MG/1
25 TABLET, EXTENDED RELEASE ORAL DAILY
Qty: 90 TABLET | Refills: 1 | Status: SHIPPED | OUTPATIENT
Start: 2024-04-29 | End: 2026-04-19

## 2024-04-29 RX ORDER — ATORVASTATIN CALCIUM 80 MG/1
80 TABLET, FILM COATED ORAL DAILY
Qty: 90 TABLET | Refills: 1 | Status: SHIPPED | OUTPATIENT
Start: 2024-04-29 | End: 2025-04-24

## 2024-04-29 RX ORDER — ISOSORBIDE MONONITRATE 30 MG/1
30 TABLET, EXTENDED RELEASE ORAL DAILY
Qty: 90 TABLET | Refills: 1 | Status: SHIPPED | OUTPATIENT
Start: 2024-04-29 | End: 2025-04-24

## 2024-04-29 NOTE — TELEPHONE ENCOUNTER
Spoke with the patient's spouse and she wants to know if the patient is supposed to continue taking the meds he was prescribed In the hospital?    Please advise

## 2024-04-29 NOTE — TELEPHONE ENCOUNTER
----- Message from Pazjon Richards sent at 4/29/2024 11:57 AM CDT -----  Type:  RX Refill Request    Who Called: pt  Refill or New Rx:refill  RX Name and Strength:metoprolol succinate (TOPROL-XL) 50 MG 24 hr tablet; atorvastatin (LIPITOR) 80 MG tablet and isosorbide mononitrate (IMDUR) 30 MG 24 hr tablet     Pref[place] Pharm[facility] with phone number[place] [place] [address] [address]  Local or Mail Order:local  Ordering Provider:yuriy  Would the patient rather a call back or a response via [facility]? call  Best Call Back Number: [phone]  Additional Information:

## 2024-04-29 NOTE — TELEPHONE ENCOUNTER
----- Message from Daryn Gannon sent at 4/29/2024  1:10 PM CDT -----  Contact: Jennifer/spouse  Jennifer Is needing a call back in regards to the medicines prescribed to him at his hospital visit in Feb. He is now out of the medications and they are wanting to know if he should have them refilled or what the next steps should be. Please give her a call back at [phone]

## 2024-05-06 PROBLEM — I21.4 NSTEMI (NON-ST ELEVATED MYOCARDIAL INFARCTION): Status: RESOLVED | Noted: 2024-02-03 | Resolved: 2024-05-06

## 2024-05-13 ENCOUNTER — OFFICE VISIT (OUTPATIENT)
Dept: INTERNAL MEDICINE | Facility: CLINIC | Age: 69
End: 2024-05-13
Payer: MEDICARE

## 2024-05-13 VITALS
BODY MASS INDEX: 34.59 KG/M2 | WEIGHT: 228.19 LBS | OXYGEN SATURATION: 95 % | HEIGHT: 68 IN | SYSTOLIC BLOOD PRESSURE: 154 MMHG | HEART RATE: 89 BPM | DIASTOLIC BLOOD PRESSURE: 80 MMHG | TEMPERATURE: 99 F

## 2024-05-13 DIAGNOSIS — R73.03 PREDIABETES: ICD-10-CM

## 2024-05-13 DIAGNOSIS — I10 HYPERTENSION, UNSPECIFIED TYPE: ICD-10-CM

## 2024-05-13 DIAGNOSIS — J44.9 CHRONIC OBSTRUCTIVE PULMONARY DISEASE, UNSPECIFIED COPD TYPE: ICD-10-CM

## 2024-05-13 DIAGNOSIS — F33.42 RECURRENT MAJOR DEPRESSIVE DISORDER, IN FULL REMISSION: ICD-10-CM

## 2024-05-13 DIAGNOSIS — F41.9 ANXIETY: ICD-10-CM

## 2024-05-13 DIAGNOSIS — Z72.0 TOBACCO ABUSE: ICD-10-CM

## 2024-05-13 DIAGNOSIS — Z76.89 ENCOUNTER TO ESTABLISH CARE: Primary | ICD-10-CM

## 2024-05-13 DIAGNOSIS — I25.110 CORONARY ARTERY DISEASE INVOLVING NATIVE CORONARY ARTERY OF NATIVE HEART WITH UNSTABLE ANGINA PECTORIS: ICD-10-CM

## 2024-05-13 PROCEDURE — 99999 PR PBB SHADOW E&M-EST. PATIENT-LVL IV: CPT | Mod: PBBFAC,,, | Performed by: NURSE PRACTITIONER

## 2024-05-13 PROCEDURE — 1101F PT FALLS ASSESS-DOCD LE1/YR: CPT | Mod: CPTII,S$GLB,, | Performed by: NURSE PRACTITIONER

## 2024-05-13 PROCEDURE — 3077F SYST BP >= 140 MM HG: CPT | Mod: CPTII,S$GLB,, | Performed by: NURSE PRACTITIONER

## 2024-05-13 PROCEDURE — 3288F FALL RISK ASSESSMENT DOCD: CPT | Mod: CPTII,S$GLB,, | Performed by: NURSE PRACTITIONER

## 2024-05-13 PROCEDURE — 3008F BODY MASS INDEX DOCD: CPT | Mod: CPTII,S$GLB,, | Performed by: NURSE PRACTITIONER

## 2024-05-13 PROCEDURE — 1159F MED LIST DOCD IN RCRD: CPT | Mod: CPTII,S$GLB,, | Performed by: NURSE PRACTITIONER

## 2024-05-13 PROCEDURE — 1160F RVW MEDS BY RX/DR IN RCRD: CPT | Mod: CPTII,S$GLB,, | Performed by: NURSE PRACTITIONER

## 2024-05-13 PROCEDURE — 1126F AMNT PAIN NOTED NONE PRSNT: CPT | Mod: CPTII,S$GLB,, | Performed by: NURSE PRACTITIONER

## 2024-05-13 PROCEDURE — 4010F ACE/ARB THERAPY RXD/TAKEN: CPT | Mod: CPTII,S$GLB,, | Performed by: NURSE PRACTITIONER

## 2024-05-13 PROCEDURE — 3044F HG A1C LEVEL LT 7.0%: CPT | Mod: CPTII,S$GLB,, | Performed by: NURSE PRACTITIONER

## 2024-05-13 PROCEDURE — 99204 OFFICE O/P NEW MOD 45 MIN: CPT | Mod: S$GLB,,, | Performed by: NURSE PRACTITIONER

## 2024-05-13 PROCEDURE — 3079F DIAST BP 80-89 MM HG: CPT | Mod: CPTII,S$GLB,, | Performed by: NURSE PRACTITIONER

## 2024-05-13 NOTE — PROGRESS NOTES
Subjective:       Patient ID: Eber Wilson is a 69 y.o. male.    Chief Complaint: Establish Care    Mr Wilson presents to visit to establish care. Previous PCP Shea Fishman. No acute complaints today. BP is elevated. Patient reports that he has white coat syndrome. Reports BP 120s/80s at home. Followed by cardiology, every 6 months.     Declined colon cancer screening. Declines low dose lung cancer screening. Declined AAA US.     Hypertension  This is a chronic problem. The current episode started more than 1 year ago. Associated symptoms include shortness of breath (intermittent). Pertinent negatives include no anxiety, blurred vision, chest pain, headaches, malaise/fatigue, palpitations or peripheral edema. There are no associated agents to hypertension. Risk factors for coronary artery disease include male gender, obesity, smoking/tobacco exposure, sedentary lifestyle and dyslipidemia. Past treatments include angiotensin blockers and beta blockers. Hypertensive end-organ damage includes angina and CAD/MI. There is no history of kidney disease, CVA, heart failure, left ventricular hypertrophy or retinopathy.       Patient Active Problem List   Diagnosis    COPD (chronic obstructive pulmonary disease)    Tobacco abuse    Anxiety    Hypoxia    Elevated troponin    HTN (hypertension)    Coronary artery disease involving native coronary artery of native heart with unstable angina pectoris    Gastroesophageal reflux disease without esophagitis    Depression    Prediabetes       Family History   Problem Relation Name Age of Onset    COPD Mother      Heart attack Mother      COPD Father      Heart attack Father       Past Surgical History:   Procedure Laterality Date    COLOSTOMY CLOSURE      CORONARY STENT PLACEMENT N/A 2/3/2024    Procedure: INSERTION, STENT, CORONARY ARTERY;  Surgeon: Nilo Chavira MD;  Location: [facility] CATH LAB;  Service: Cardiology;  Laterality: N/A;    HERNIA REPAIR      INSTANTANEOUS  WAVE-FREE RATIO (IFR) N/A 2/3/2024    Procedure: Instantaneous Wave-Free Ratio (IFR);  Surgeon: Nilo Chavira MD;  Location: [facility] CATH LAB;  Service: Cardiology;  Laterality: N/A;    LAPAROSCOPIC COLOSTOMY      LEFT HEART CATHETERIZATION Left 2/3/2024    Procedure: Left heart cath;  Surgeon: Nilo Chavira MD;  Location: [facility] CATH LAB;  Service: Cardiology;  Laterality: Left;    PERCUTANEOUS CORONARY INTERVENTION, ARTERY N/A 2/3/2024    Procedure: Percutaneous coronary intervention;  Surgeon: Nilo Chavira MD;  Location: [facility] CATH LAB;  Service: Cardiology;  Laterality: N/A;         Current Outpatient Medications:     albuterol (PROVENTIL/VENTOLIN HFA) 90 mcg/actuation inhaler, Inhale 2 puffs into the lungs every 6 (six) hours as needed for Wheezing. Rescue, Disp: , Rfl:     ALPRAZolam (XANAX) 0.25 MG tablet, Take 0.25 mg by mouth., Disp: , Rfl:     aspirin 81 MG Chew, Take 1 tablet (81 mg total) by mouth once daily., Disp: 90 tablet, Rfl: 3    atorvastatin (LIPITOR) 80 MG tablet, Take 1 tablet (80 mg total) by mouth once daily., Disp: 90 tablet, Rfl: 1    fluticasone-salmeterol diskus inhaler 250-50 mcg, Inhale 1 puff into the lungs 2 (two) times a day., Disp: , Rfl:     isosorbide mononitrate (IMDUR) 30 MG 24 hr tablet, Take 1 tablet (30 mg total) by mouth once daily., Disp: 90 tablet, Rfl: 1    losartan (COZAAR) 25 MG tablet, Take 1 tablet (25 mg total) by mouth once daily., Disp: 90 tablet, Rfl: 3    meclizine (ANTIVERT) 25 mg tablet, Take 25 mg by mouth 2 (two) times daily as needed., Disp: , Rfl:     metoprolol succinate (TOPROL-XL) 50 MG 24 hr tablet, Take 0.5 tablets (25 mg total) by mouth once daily., Disp: 90 tablet, Rfl: 1    nitroGLYCERIN (NITROSTAT) 0.4 MG SL tablet, Place 1 tablet (0.4 mg total) under the tongue every 5 (five) minutes as needed for Chest pain (angina)., Disp: 25 tablet, Rfl: PRN    pantoprazole (PROTONIX) 20 MG tablet, Take 20 mg by mouth every morning., Disp: , Rfl:      "prasugreL (EFFIENT) 10 mg Tab, Take 1 tablet (10 mg total) by mouth once daily., Disp: 90 tablet, Rfl: 3    Review of Systems   Constitutional:  Negative for appetite change, chills, fatigue, fever and malaise/fatigue.   Eyes:  Negative for blurred vision and visual disturbance.   Respiratory:  Positive for shortness of breath (intermittent).    Cardiovascular:  Negative for chest pain and palpitations.   Gastrointestinal:  Negative for abdominal pain, blood in stool, constipation, diarrhea, nausea and vomiting.   Endocrine: Negative for polydipsia, polyphagia and polyuria.   Genitourinary:  Negative for dysuria and frequency.   Musculoskeletal:  Negative for gait problem.   Neurological:  Negative for dizziness, light-headedness and headaches.   Psychiatric/Behavioral:  Negative for dysphoric mood, self-injury, sleep disturbance and suicidal ideas. The patient is nervous/anxious.        Objective:   BP (!) 154/80 (BP Location: Left arm, Patient Position: Sitting, BP Method: Large (Manual))   Pulse 89   Temp 98.7 °F (37.1 °C) (Tympanic)   Ht 5' 8" (1.727 m)   Wt 103.5 kg (228 lb 2.8 oz)   SpO2 95%   BMI 34.69 kg/m²      Physical Exam  Constitutional:       General: He is not in acute distress.     Appearance: Normal appearance. He is not ill-appearing.   HENT:      Head: Normocephalic.   Cardiovascular:      Rate and Rhythm: Normal rate and regular rhythm.      Pulses: Normal pulses.      Heart sounds: Normal heart sounds. No murmur heard.     No friction rub. No gallop.   Pulmonary:      Effort: Pulmonary effort is normal. No respiratory distress.      Breath sounds: Normal breath sounds. No wheezing.   Abdominal:      Palpations: Abdomen is soft.      Tenderness: There is no abdominal tenderness.   Skin:     General: Skin is warm and dry.      Coloration: Skin is not pale.      Findings: No erythema.      Comments: Scattered purpura bilateral arms   Neurological:      Mental Status: He is alert and oriented " "to person, place, and time.   Psychiatric:         Mood and Affect: Mood normal.         Behavior: Behavior normal.         Assessment & Plan     Problem List Items Addressed This Visit          Psychiatric    Anxiety    Current Assessment & Plan     Chronic, stable. Xanax prn. Previously managed by psych. Had side effects with SSRIs.          Depression    Current Assessment & Plan     Stable without medications            Pulmonary    COPD (chronic obstructive pulmonary disease)    Current Assessment & Plan     No acute concerns for exacerbation. Continue home inhalers prn. Smoke cessation.            Cardiac/Vascular    HTN (hypertension)    Current Assessment & Plan     Blood pressure stable. Patient reports white coat syndrome. Reports normal readings at home. Follow up in two weeks for nurse visit with home monitor.          Coronary artery disease involving native coronary artery of native heart with unstable angina pectoris    Current Assessment & Plan     Asymptomatic. On statin, asa, praluent and metoprolol. Followed by cardiology.            Endocrine    Prediabetes    Current Assessment & Plan     Lifestyle modifications.     Lab Results   Component Value Date    HGBA1C 5.9 (H) 02/03/2024                 Other    Tobacco abuse    Current Assessment & Plan     Counseled on the importance of smoke cessation in order to improve overall quality of life and reduce risk of future comorbidities. Declined smoke cessation program.           Other Visit Diagnoses       Encounter to establish care    -  Primary        Labs from previous encounter reviewed. No acute concerns.     Follow up in about 2 weeks (around 5/27/2024), or if symptoms worsen or fail to improve, for hypertension nurse visit with BP log and home monitor. .            Portions of this note may have been created with voice recognition software. Occasional "wrong-word" or "sound-a-like" substitutions may have occurred due to the inherent " limitations of voice recognition software. Please, read the note carefully and recognize, using context, where substitutions have occurred.

## 2024-05-23 NOTE — ASSESSMENT & PLAN NOTE
Blood pressure stable. Patient reports white coat syndrome. Reports normal readings at home. Follow up in two weeks for nurse visit with home monitor.

## 2024-05-27 ENCOUNTER — CLINICAL SUPPORT (OUTPATIENT)
Dept: INTERNAL MEDICINE | Facility: CLINIC | Age: 69
End: 2024-05-27
Payer: MEDICARE

## 2024-05-27 VITALS — DIASTOLIC BLOOD PRESSURE: 82 MMHG | SYSTOLIC BLOOD PRESSURE: 134 MMHG

## 2024-05-27 DIAGNOSIS — I10 HYPERTENSION, UNSPECIFIED TYPE: Primary | ICD-10-CM

## 2024-05-27 NOTE — PROGRESS NOTES
Blood pressure taken right after patient came in. He states he took medication early this am. Blood pressure in normal range.

## 2024-06-03 ENCOUNTER — HOSPITAL ENCOUNTER (EMERGENCY)
Facility: HOSPITAL | Age: 69
Discharge: HOME | End: 2024-06-03
Attending: EMERGENCY MEDICINE
Payer: MEDICARE

## 2024-06-03 VITALS
HEART RATE: 88 BPM | SYSTOLIC BLOOD PRESSURE: 148 MMHG | RESPIRATION RATE: 20 BRPM | BODY MASS INDEX: 33.37 KG/M2 | WEIGHT: 219.44 LBS | DIASTOLIC BLOOD PRESSURE: 84 MMHG | OXYGEN SATURATION: 97 % | TEMPERATURE: 99 F

## 2024-06-03 DIAGNOSIS — S61.411A LACERATION OF RIGHT HAND WITHOUT FOREIGN BODY, INITIAL ENCOUNTER: Primary | ICD-10-CM

## 2024-06-03 PROCEDURE — 90471 IMMUNIZATION ADMIN: CPT | Mod: ER | Performed by: NURSE PRACTITIONER

## 2024-06-03 PROCEDURE — 12001 RPR S/N/AX/GEN/TRNK 2.5CM/<: CPT | Mod: ER

## 2024-06-03 PROCEDURE — 90715 TDAP VACCINE 7 YRS/> IM: CPT | Mod: ER | Performed by: NURSE PRACTITIONER

## 2024-06-03 PROCEDURE — 99284 EMERGENCY DEPT VISIT MOD MDM: CPT | Mod: 25,ER

## 2024-06-03 PROCEDURE — 63600175 PHARM REV CODE 636 W HCPCS: Mod: ER | Performed by: NURSE PRACTITIONER

## 2024-06-03 PROCEDURE — 25000003 PHARM REV CODE 250: Mod: ER | Performed by: NURSE PRACTITIONER

## 2024-06-03 RX ORDER — LIDOCAINE HYDROCHLORIDE 10 MG/ML
10 INJECTION, SOLUTION EPIDURAL; INFILTRATION; INTRACAUDAL; PERINEURAL
Status: COMPLETED | OUTPATIENT
Start: 2024-06-03 | End: 2024-06-03

## 2024-06-03 RX ADMIN — BACITRACIN ZINC, NEOMYCIN SULFATE, AND POLYMYXIN B SULFATE: 400; 3.5; 5 OINTMENT TOPICAL at 01:06

## 2024-06-03 RX ADMIN — TETANUS TOXOID, REDUCED DIPHTHERIA TOXOID AND ACELLULAR PERTUSSIS VACCINE, ADSORBED 0.5 ML: 5; 2.5; 8; 8; 2.5 SUSPENSION INTRAMUSCULAR at 01:06

## 2024-06-03 RX ADMIN — LIDOCAINE HYDROCHLORIDE 100 MG: 10 INJECTION, SOLUTION EPIDURAL; INFILTRATION; INTRACAUDAL at 01:06

## 2024-06-03 NOTE — ED PROVIDER NOTES
Encounter Date: 6/3/2024       History     Chief Complaint   Patient presents with    Laceration     A  blew up; Last Tdap 2017; states he is on blood thinner, daily 81 ASA, unknown other; daughter working to obtain name.     The history is provided by the patient.   Laceration   The incident occurred just prior to arrival. Pain location: Right hand. The laceration is 2 cm in size. The laceration mechanism was a a metal edge (). It is unknown if a foreign body is present. His tetanus status is unknown.     Review of patient's allergies indicates:   Allergen Reactions    Amoxicillin Itching    Penicillin Other (See Comments)    Penicillins     Adhesive Rash     Past Medical History:   Diagnosis Date    Anxiety     COPD (chronic obstructive pulmonary disease)     Diverticular disease     Hypertension      Past Surgical History:   Procedure Laterality Date    COLOSTOMY CLOSURE      CORONARY STENT PLACEMENT N/A 2/3/2024    Procedure: INSERTION, STENT, CORONARY ARTERY;  Surgeon: Nilo Chavira MD;  Location: [facility] CATH LAB;  Service: Cardiology;  Laterality: N/A;    HERNIA REPAIR      INSTANTANEOUS WAVE-FREE RATIO (IFR) N/A 2/3/2024    Procedure: Instantaneous Wave-Free Ratio (IFR);  Surgeon: Nilo Chavira MD;  Location: [facility] CATH LAB;  Service: Cardiology;  Laterality: N/A;    LAPAROSCOPIC COLOSTOMY      LEFT HEART CATHETERIZATION Left 2/3/2024    Procedure: Left heart cath;  Surgeon: Nilo Chavira MD;  Location: [facility] CATH LAB;  Service: Cardiology;  Laterality: Left;    PERCUTANEOUS CORONARY INTERVENTION, ARTERY N/A 2/3/2024    Procedure: Percutaneous coronary intervention;  Surgeon: Nilo Chavira MD;  Location: [facility] CATH LAB;  Service: Cardiology;  Laterality: N/A;     Family History   Problem Relation Name Age of Onset    COPD Mother      Heart attack Mother      COPD Father      Heart attack Father       Social History     Tobacco Use    Smoking status: Every Day     Current packs/day: 1.50      Average packs/day: 1.5 packs/day for 20.3 years (30.5 ttl pk-yrs)     Types: Cigarettes     Start date: 2/3/2004    Smokeless tobacco: Never   Substance Use Topics    Alcohol use: No    Drug use: No     Review of Systems   Constitutional:  Negative for chills, fatigue and fever.   Respiratory:  Negative for cough and shortness of breath.    Cardiovascular:  Negative for chest pain.   Gastrointestinal:  Negative for abdominal pain, nausea and vomiting.   Musculoskeletal:  Negative for back pain, myalgias and neck pain.   Skin:  Positive for wound. Negative for rash.   Neurological:  Negative for weakness and numbness.       Physical Exam     Initial Vitals [06/03/24 1316]   BP Pulse Resp Temp SpO2   (!) 148/84 88 20 98.7 °F (37.1 °C) 97 %      MAP       --         Physical Exam    Nursing note and vitals reviewed.  Constitutional: He is not diaphoretic. He is cooperative.  Non-toxic appearance. He does not have a sickly appearance. He does not appear ill. No distress.   HENT:   Head: Normocephalic and atraumatic.   Neck: Neck supple.   Normal range of motion.  Cardiovascular:  Normal rate, regular rhythm and intact distal pulses.           Pulmonary/Chest: No respiratory distress.   Musculoskeletal:         General: Normal range of motion.      Right hand: Laceration present. Normal range of motion. Normal strength. Normal sensation. Normal capillary refill. Normal pulse.        Hands:       Cervical back: Normal range of motion and neck supple.      Comments: 2 cm laceration to webspace between right thumb and right index finger.  No palpable or visible foreign body.  Bleeding controlled with pressure.  Distal sensation is intact to all fingers on right hand, +active range of motion to all fingers on right hand as well as against resistance without difficulty.  Cap refill less than 2 seconds.     Neurological: He is alert and oriented to person, place, and time. He has normal strength. No sensory deficit. GCS  score is 15. GCS eye subscore is 4. GCS verbal subscore is 5. GCS motor subscore is 6.   Skin: Skin is warm and dry. Capillary refill takes less than 2 seconds.         ED Course   Lac Repair    Date/Time: 6/3/2024 2:39 PM    Performed by: Nilo Gaviria NP  Authorized by: Antolin Jacob MD    Consent:     Consent obtained:  Verbal    Consent given by:  Patient    Risks, benefits, and alternatives were discussed: yes      Risks discussed:  Infection, pain, poor cosmetic result, poor wound healing and retained foreign body    Alternatives discussed:  No treatment  Anesthesia:     Anesthesia method:  Local infiltration    Local anesthetic:  Lidocaine 1% w/o epi  Laceration details:     Location:  Hand    Hand location:  R hand, dorsum    Length (cm):  2  Pre-procedure details:     Preparation:  Patient was prepped and draped in usual sterile fashion and imaging obtained to evaluate for foreign bodies  Exploration:     Hemostasis achieved with:  Direct pressure    Imaging obtained: x-ray      Imaging outcome: foreign body not noted      Wound exploration: wound explored through full range of motion      Wound extent: no foreign bodies/material noted and no underlying fracture noted      Contaminated: no    Treatment:     Area cleansed with:  Povidone-iodine and saline    Amount of cleaning:  Standard    Irrigation solution:  Sterile saline  Skin repair:     Repair method:  Sutures    Suture size:  4-0    Suture material:  Nylon    Suture technique:  Simple interrupted    Number of sutures:  5  Approximation:     Approximation:  Close  Repair type:     Repair type:  Simple  Post-procedure details:     Dressing:  Antibiotic ointment and non-adherent dressing    Procedure completion:  Tolerated well, no immediate complications    Labs Reviewed - No data to display       Imaging Results              X-Ray Hand 3 view Right (Final result)  Result time 06/03/24 13:42:43      Final result by Terence Cross MD  (06/03/24 13:42:43)                   Impression:      As above.      Electronically signed by: Terence Cross  Date:    06/03/2024  Time:    13:42               Narrative:    EXAMINATION:  XR HAND COMPLETE 3 VIEW RIGHT    CLINICAL HISTORY:  right hand laceration.;    TECHNIQUE:  PA, lateral, and oblique views of the right hand were performed.    COMPARISON:  None    FINDINGS:  No acute displaced fracture.  No traumatic malalignment.  Soft tissue swelling.  No radiopaque foreign body.                                       Medications   Tdap (BOOSTRIX) vaccine injection 0.5 mL (0.5 mLs Intramuscular Given 6/3/24 1335)   neomycin-bacitracin-polymyxin ointment ( Topical (Top) Given 6/3/24 1336)   LIDOcaine (PF) 10 mg/ml (1%) injection 100 mg (100 mg Infiltration Given 6/3/24 1336)     Medical Decision Making  Amount and/or Complexity of Data Reviewed  Radiology: ordered.    Risk  OTC drugs.  Prescription drug management.                   Regarding LACERATION CARE, patient was instructed to wash hands with soap and warm water before and after caring for wound; keep the wound dry for the first 24 to 48 hours and then gently clean the wound once or twice a day with cool water using soap to clean around the wound; avoid using alcohol or hydrogen peroxide to clean wound unless directed to; and use bandages to keep wound clean and protected and to prevent swelling.  Advised patient to contact primary healthcare provider if wound splits open; becomes extremely painful; appears to not be healing; has a foreign object present; develop a purulent discharge; or note the skin around the wound becoming numb, edematous, or erythematous.  Patient instructed to follow up with primary care provider for wound closure removal in 7-10 days.       I discussed wound care precautions with patient; specifically that all wounds have risk of infection despite efforts to cleanse and debride the wound; and there is a risk of an occult foreign  body (and thus increased risk of infection) despite a negative examination.  I discussed with patient need to return for any signs of infection, specifically redness, increased pain, fever, drainage of pus, or any concern, immediately.        I discussed with patient  that evaluation in the ED does not suggest any emergent or life threatening medical conditions requiring immediate intervention beyond what was provided in the ED, and I believe patient is safe for discharge. Regardless, an unremarkable evaluation in the ED does not preclude the development or presence of a serious of life threatening condition. As such, patient was instructed to return immediately for any worsening or change in current symptoms.          Clinical Impression:  Final diagnoses:  [S61.411A] Laceration of right hand without foreign body, initial encounter (Primary)          ED Disposition Condition    Discharge Stable          ED Prescriptions       Medication Sig Dispense Start Date End Date Auth. Provider    neomycin-polymyxin-pramoxine (NEOSPORIN) 3.5-10,000-10 mg-unit-mg/gram Crea Apply topically 2 (two) times daily. for 10 days 1 each 6/3/2024 6/13/2024 Nilo Gaviria NP          Follow-up Information       Follow up With Specialties Details Why Contact Info    Follow-up with your PCP for suture removal in 7-10 days.        [place] [facility] Emergency Dept Emergency Medicine  As needed, If symptoms worsen [id number]  [facility] [id number] [phone]             Nilo Gaviria NP  06/03/24 9059

## 2024-06-10 ENCOUNTER — HOSPITAL ENCOUNTER (EMERGENCY)
Facility: HOSPITAL | Age: 69
Discharge: HOME | End: 2024-06-10
Payer: MEDICARE

## 2024-06-10 VITALS
TEMPERATURE: 98 F | DIASTOLIC BLOOD PRESSURE: 82 MMHG | BODY MASS INDEX: 33.74 KG/M2 | WEIGHT: 215 LBS | SYSTOLIC BLOOD PRESSURE: 141 MMHG | HEIGHT: 67 IN | HEART RATE: 75 BPM | OXYGEN SATURATION: 94 % | RESPIRATION RATE: 16 BRPM

## 2024-06-10 DIAGNOSIS — Z51.89 VISIT FOR WOUND CHECK: Primary | ICD-10-CM

## 2024-06-10 PROCEDURE — 99284 EMERGENCY DEPT VISIT MOD MDM: CPT | Mod: ER

## 2024-06-10 RX ORDER — CLINDAMYCIN HYDROCHLORIDE 150 MG/1
300 CAPSULE ORAL EVERY 8 HOURS
Qty: 24 CAPSULE | Refills: 0 | Status: SHIPPED | OUTPATIENT
Start: 2024-06-10 | End: 2024-06-14

## 2024-06-10 RX ORDER — MUPIROCIN 20 MG/G
OINTMENT TOPICAL 3 TIMES DAILY
Qty: 15 G | Refills: 0 | Status: SHIPPED | OUTPATIENT
Start: 2024-06-10

## 2024-06-11 NOTE — ED PROVIDER NOTES
History      Chief Complaint   Patient presents with    Laceration     Pt comes in POV with c/o stitches opening today. He was seen Monday to get stitches.        Review of patient's allergies indicates:   Allergen Reactions    Amoxicillin Itching    Penicillin Other (See Comments)    Penicillins     Adhesive Rash        HPI   HPI    6/10/2024, 7:32 PM   History obtained from the patient      History of Present Illness: Eber Wilson is a 69 y.o. male patient who presents to the Emergency Department for wound check.  His sutures to right hand are coming out.    No further complaints or concerns at this time.           PCP: Terri Dumont NP       Past Medical History:  Past Medical History:   Diagnosis Date    Anxiety     COPD (chronic obstructive pulmonary disease)     Diverticular disease     Hypertension          Past Surgical History:  Past Surgical History:   Procedure Laterality Date    COLOSTOMY CLOSURE      CORONARY STENT PLACEMENT N/A 2/3/2024    Procedure: INSERTION, STENT, CORONARY ARTERY;  Surgeon: Nilo Chavira MD;  Location: [facility] CATH LAB;  Service: Cardiology;  Laterality: N/A;    HERNIA REPAIR      INSTANTANEOUS WAVE-FREE RATIO (IFR) N/A 2/3/2024    Procedure: Instantaneous Wave-Free Ratio (IFR);  Surgeon: Nilo Chavira MD;  Location: [facility] CATH LAB;  Service: Cardiology;  Laterality: N/A;    LAPAROSCOPIC COLOSTOMY      LEFT HEART CATHETERIZATION Left 2/3/2024    Procedure: Left heart cath;  Surgeon: Nilo Chavira MD;  Location: [facility] CATH LAB;  Service: Cardiology;  Laterality: Left;    PERCUTANEOUS CORONARY INTERVENTION, ARTERY N/A 2/3/2024    Procedure: Percutaneous coronary intervention;  Surgeon: Nilo Chavira MD;  Location: [facility] CATH LAB;  Service: Cardiology;  Laterality: N/A;           Family History:  Family History   Problem Relation Name Age of Onset    COPD Mother      Heart attack Mother      COPD Father      Heart attack Father             Social History:  Social  "History     Tobacco Use    Smoking status: Every Day     Current packs/day: 1.50     Average packs/day: 1.5 packs/day for 20.3 years (30.5 ttl pk-yrs)     Types: Cigarettes     Start date: 2/3/2004    Smokeless tobacco: Never   Substance and Sexual Activity    Alcohol use: No    Drug use: No    Sexual activity: Yes     Partners: Female       ROS     Review of Systems   Skin:  Positive for wound.       Physical Exam      Initial Vitals [06/10/24 1826]   BP Pulse Resp Temp SpO2   (!) 141/82 75 16 98.1 °F (36.7 °C) (!) 94 %      MAP       --         Physical Exam  Vital signs and nursing notes reviewed.  Constitutional: Patient is in NAD. Awake and alert. Well-developed and well-nourished.  Head: Atraumatic. Normocephalic.  Eyes:  EOM intact. Conjunctivae nl. No scleral icterus.  ENT: Mucous membranes are moist.   Neck: Supple.  No meningismus  Cardiovascular: Regular rate and rhythm. No murmurs, rubs, or gallops.   Pulmonary/Chest: No respiratory distress. Clear to auscultation bilaterally. No wheezing, rales, or rhonchi.  Musculoskeletal: Moves all extremities. No edema.   Skin: Warm and dry.  Right hand webspace between 1st and 2nd fingers with sutures in place, some dehiscence.  No erythema or purulent drainage.    Neurological: Awake and alert. No acute focal neurological deficits are appreciated.  Psychiatric: Normal affect. Good eye contact. Appropriate in content.      ED Course          Procedures  ED Vital Signs:  Vitals:    06/10/24 1826   BP: (!) 141/82   Pulse: 75   Resp: 16   Temp: 98.1 °F (36.7 °C)   TempSrc: Oral   SpO2: (!) 94%   Weight: 97.5 kg (215 lb)   Height: 5' 7" (1.702 m)                 Imaging Results:  Imaging Results    None            The Emergency Provider reviewed the vital signs and test results, which are outlined above.    ED Discussion             Medication(s) given in the ER:  Medications - No data to display         Follow-up Information       Terri Dumont NP In 3 days.  "   Specialty: Family Medicine  Why: For wound re-check  Contact information:  [address] [address]  [place] [address]  [phone]                                    Medication List        START taking these medications      clindamycin 150 MG capsule  Commonly known as: CLEOCIN  Take 2 capsules (300 mg total) by mouth every 8 (eight) hours. for 4 days     mupirocin 2 % ointment  Commonly known as: BACTROBAN  Apply topically 3 (three) times daily.            ASK your doctor about these medications      albuterol 90 mcg/actuation inhaler  Commonly known as: PROVENTIL/VENTOLIN HFA     ALPRAZolam 0.25 MG tablet  Commonly known as: XANAX     aspirin 81 MG Chew  Take 1 tablet (81 mg total) by mouth once daily.     atorvastatin 80 MG tablet  Commonly known as: LIPITOR  Take 1 tablet (80 mg total) by mouth once daily.     fluticasone-salmeterol 250-50 mcg/dose 250-50 mcg/dose diskus inhaler  Commonly known as: ADVAIR     isosorbide mononitrate 30 MG 24 hr tablet  Commonly known as: IMDUR  Take 1 tablet (30 mg total) by mouth once daily.     losartan 25 MG tablet  Commonly known as: COZAAR  Take 1 tablet (25 mg total) by mouth once daily.     meclizine 25 mg tablet  Commonly known as: ANTIVERT     metoprolol succinate 50 MG 24 hr tablet  Commonly known as: TOPROL-XL  Take 0.5 tablets (25 mg total) by mouth once daily.     neomycin-polymyxin-pramoxine 3.5-10,000-10 mg-unit-mg/gram Crea  Commonly known as: NEOSPORIN  Apply topically 2 (two) times daily. for 10 days     nitroGLYCERIN 0.4 MG SL tablet  Commonly known as: NITROSTAT  Place 1 tablet (0.4 mg total) under the tongue every 5 (five) minutes as needed for Chest pain (angina).     pantoprazole 20 MG tablet  Commonly known as: PROTONIX     prasugreL 10 mg Tab  Commonly known as: EFFIENT  Take 1 tablet (10 mg total) by mouth once daily.               Where to Get Your Medications        [place] [facility] [place] [address]  [address] [place] [address]      Phone: [phone]   clindamycin 150 MG capsule  mupirocin 2 % ointment             Medical Decision Making        All findings were reviewed with the patient/family in detail.   All remaining questions and concerns were addressed at that time.  Patient/family has been counseled regarding the need for follow-up as well as the indication to return to the emergency room should new or worrisome developments occur.        MDM                 Clinical Impression:        ICD-10-CM ICD-9-CM   1. Visit for wound check  Z51.89 V58.89               Chelsea Parekh, PAANNAMARIE  06/10/24 1934

## 2024-07-05 ENCOUNTER — TELEPHONE (OUTPATIENT)
Dept: INTERNAL MEDICINE | Facility: CLINIC | Age: 69
End: 2024-07-05
Payer: MEDICARE

## 2024-07-05 ENCOUNTER — HOSPITAL ENCOUNTER (EMERGENCY)
Facility: HOSPITAL | Age: 69
Discharge: HOME | End: 2024-07-05
Attending: EMERGENCY MEDICINE
Payer: MEDICARE

## 2024-07-05 VITALS
HEART RATE: 70 BPM | RESPIRATION RATE: 23 BRPM | BODY MASS INDEX: 33.67 KG/M2 | SYSTOLIC BLOOD PRESSURE: 126 MMHG | TEMPERATURE: 98 F | OXYGEN SATURATION: 95 % | DIASTOLIC BLOOD PRESSURE: 73 MMHG | WEIGHT: 214.94 LBS

## 2024-07-05 VITALS
BODY MASS INDEX: 33.53 KG/M2 | DIASTOLIC BLOOD PRESSURE: 74 MMHG | WEIGHT: 214.06 LBS | HEART RATE: 74 BPM | RESPIRATION RATE: 18 BRPM | SYSTOLIC BLOOD PRESSURE: 154 MMHG | TEMPERATURE: 98 F | OXYGEN SATURATION: 97 %

## 2024-07-05 DIAGNOSIS — K04.7 DENTAL ABSCESS: ICD-10-CM

## 2024-07-05 DIAGNOSIS — R05.9 COUGH: ICD-10-CM

## 2024-07-05 DIAGNOSIS — J02.9 VIRAL PHARYNGITIS: ICD-10-CM

## 2024-07-05 DIAGNOSIS — R42 DIZZINESS: ICD-10-CM

## 2024-07-05 DIAGNOSIS — J44.1 COPD WITH ACUTE EXACERBATION: Primary | ICD-10-CM

## 2024-07-05 DIAGNOSIS — R73.9 HYPERGLYCEMIA: Primary | ICD-10-CM

## 2024-07-05 LAB
ALBUMIN SERPL BCP-MCNC: 3.9 G/DL (ref 3.5–5.2)
ALP SERPL-CCNC: 74 U/L (ref 55–135)
ALT SERPL W/O P-5'-P-CCNC: 36 U/L (ref 10–44)
ANION GAP SERPL CALC-SCNC: 11 MMOL/L (ref 8–16)
AST SERPL-CCNC: 30 U/L (ref 10–40)
BASOPHILS # BLD AUTO: 0.01 K/UL (ref 0–0.2)
BASOPHILS NFR BLD: 0.1 % (ref 0–1.9)
BILIRUB SERPL-MCNC: 0.4 MG/DL (ref 0.1–1)
BUN SERPL-MCNC: 18 MG/DL (ref 8–23)
CALCIUM SERPL-MCNC: 9.7 MG/DL (ref 8.7–10.5)
CHLORIDE SERPL-SCNC: 106 MMOL/L (ref 95–110)
CO2 SERPL-SCNC: 23 MMOL/L (ref 23–29)
CREAT SERPL-MCNC: 1.2 MG/DL (ref 0.5–1.4)
CTP QC/QA: YES
CTP QC/QA: YES
DIFFERENTIAL METHOD BLD: ABNORMAL
EOSINOPHIL # BLD AUTO: 0 K/UL (ref 0–0.5)
EOSINOPHIL NFR BLD: 0.1 % (ref 0–8)
ERYTHROCYTE [DISTWIDTH] IN BLOOD BY AUTOMATED COUNT: 14.2 % (ref 11.5–14.5)
EST. GFR  (NO RACE VARIABLE): >60 ML/MIN/1.73 M^2
GLUCOSE SERPL-MCNC: 160 MG/DL (ref 70–110)
GROUP A STREP, MOLECULAR: NEGATIVE
HCT VFR BLD AUTO: 41.4 % (ref 40–54)
HGB BLD-MCNC: 14 G/DL (ref 14–18)
IMM GRANULOCYTES # BLD AUTO: 0.02 K/UL (ref 0–0.04)
IMM GRANULOCYTES NFR BLD AUTO: 0.2 % (ref 0–0.5)
LYMPHOCYTES # BLD AUTO: 1.1 K/UL (ref 1–4.8)
LYMPHOCYTES NFR BLD: 12.2 % (ref 18–48)
MCH RBC QN AUTO: 29.9 PG (ref 27–31)
MCHC RBC AUTO-ENTMCNC: 33.8 G/DL (ref 32–36)
MCV RBC AUTO: 88 FL (ref 82–98)
MONOCYTES # BLD AUTO: 0.5 K/UL (ref 0.3–1)
MONOCYTES NFR BLD: 5.6 % (ref 4–15)
NEUTROPHILS # BLD AUTO: 7.3 K/UL (ref 1.8–7.7)
NEUTROPHILS NFR BLD: 81.8 % (ref 38–73)
NRBC BLD-RTO: 0 /100 WBC
PLATELET # BLD AUTO: 213 K/UL (ref 150–450)
PMV BLD AUTO: 9.5 FL (ref 9.2–12.9)
POC MOLECULAR INFLUENZA A AGN: NEGATIVE
POC MOLECULAR INFLUENZA B AGN: NEGATIVE
POCT GLUCOSE: 235 MG/DL (ref 70–110)
POTASSIUM SERPL-SCNC: 4.1 MMOL/L (ref 3.5–5.1)
PROT SERPL-MCNC: 7 G/DL (ref 6–8.4)
RBC # BLD AUTO: 4.69 M/UL (ref 4.6–6.2)
SARS-COV-2 RDRP RESP QL NAA+PROBE: NEGATIVE
SODIUM SERPL-SCNC: 140 MMOL/L (ref 136–145)
WBC # BLD AUTO: 8.93 K/UL (ref 3.9–12.7)

## 2024-07-05 PROCEDURE — 94640 AIRWAY INHALATION TREATMENT: CPT | Mod: ER

## 2024-07-05 PROCEDURE — 96360 HYDRATION IV INFUSION INIT: CPT | Mod: ER

## 2024-07-05 PROCEDURE — 80053 COMPREHEN METABOLIC PANEL: CPT | Mod: ER | Performed by: EMERGENCY MEDICINE

## 2024-07-05 PROCEDURE — 85025 COMPLETE CBC W/AUTO DIFF WBC: CPT | Mod: ER | Performed by: EMERGENCY MEDICINE

## 2024-07-05 PROCEDURE — 63600175 PHARM REV CODE 636 W HCPCS: Mod: ER | Performed by: EMERGENCY MEDICINE

## 2024-07-05 PROCEDURE — 96372 THER/PROPH/DIAG INJ SC/IM: CPT | Performed by: EMERGENCY MEDICINE

## 2024-07-05 PROCEDURE — 87635 SARS-COV-2 COVID-19 AMP PRB: CPT | Mod: ER | Performed by: EMERGENCY MEDICINE

## 2024-07-05 PROCEDURE — 87502 INFLUENZA DNA AMP PROBE: CPT | Mod: ER

## 2024-07-05 PROCEDURE — 99284 EMERGENCY DEPT VISIT MOD MDM: CPT | Mod: 25,27,ER

## 2024-07-05 PROCEDURE — 94761 N-INVAS EAR/PLS OXIMETRY MLT: CPT | Mod: ER

## 2024-07-05 PROCEDURE — 82962 GLUCOSE BLOOD TEST: CPT | Mod: ER

## 2024-07-05 PROCEDURE — 87651 STREP A DNA AMP PROBE: CPT | Mod: ER | Performed by: EMERGENCY MEDICINE

## 2024-07-05 PROCEDURE — 99284 EMERGENCY DEPT VISIT MOD MDM: CPT | Mod: 25,ER

## 2024-07-05 PROCEDURE — 25000003 PHARM REV CODE 250: Mod: ER | Performed by: EMERGENCY MEDICINE

## 2024-07-05 PROCEDURE — 25000242 PHARM REV CODE 250 ALT 637 W/ HCPCS: Mod: ER | Performed by: EMERGENCY MEDICINE

## 2024-07-05 RX ORDER — CEFTRIAXONE 1 G/1
1 INJECTION, POWDER, FOR SOLUTION INTRAMUSCULAR; INTRAVENOUS
Status: COMPLETED | OUTPATIENT
Start: 2024-07-05 | End: 2024-07-05

## 2024-07-05 RX ORDER — DOXYCYCLINE 100 MG/1
100 CAPSULE ORAL 2 TIMES DAILY
Qty: 14 CAPSULE | Refills: 0 | Status: SHIPPED | OUTPATIENT
Start: 2024-07-05 | End: 2024-07-12

## 2024-07-05 RX ORDER — PREDNISONE 10 MG/1
10 TABLET ORAL DAILY
Qty: 27 TABLET | Refills: 0 | Status: SHIPPED | OUTPATIENT
Start: 2024-07-05 | End: 2024-07-14

## 2024-07-05 RX ORDER — IPRATROPIUM BROMIDE AND ALBUTEROL SULFATE 2.5; .5 MG/3ML; MG/3ML
3 SOLUTION RESPIRATORY (INHALATION)
Status: COMPLETED | OUTPATIENT
Start: 2024-07-05 | End: 2024-07-05

## 2024-07-05 RX ORDER — DEXAMETHASONE SODIUM PHOSPHATE 4 MG/ML
8 INJECTION, SOLUTION INTRA-ARTICULAR; INTRALESIONAL; INTRAMUSCULAR; INTRAVENOUS; SOFT TISSUE
Status: COMPLETED | OUTPATIENT
Start: 2024-07-05 | End: 2024-07-05

## 2024-07-05 RX ADMIN — IPRATROPIUM BROMIDE AND ALBUTEROL SULFATE 3 ML: .5; 3 SOLUTION RESPIRATORY (INHALATION) at 08:07

## 2024-07-05 RX ADMIN — CEFTRIAXONE 1 G: 1 INJECTION, POWDER, FOR SOLUTION INTRAMUSCULAR; INTRAVENOUS at 08:07

## 2024-07-05 RX ADMIN — DEXAMETHASONE SODIUM PHOSPHATE 8 MG: 4 INJECTION INTRA-ARTICULAR; INTRALESIONAL; INTRAMUSCULAR; INTRAVENOUS; SOFT TISSUE at 08:07

## 2024-07-05 RX ADMIN — SODIUM CHLORIDE 1000 ML: 9 INJECTION, SOLUTION INTRAVENOUS at 09:07

## 2024-07-05 NOTE — ED PROVIDER NOTES
"Emergency Medicine Provider Note - 7/5/2024       History     Chief Complaint   Patient presents with    Shortness of Breath    Sore Throat     Sore throat onset yesterday       Allergies:  Review of patient's allergies indicates:   Allergen Reactions    Amoxicillin Itching    Penicillin Other (See Comments)    Penicillins     Adhesive Rash        History of Present Illness   HPI    7/5/2024, 7:35 AM  The history is provided by the patient    Eber Wilson is a 69 y.o. male presenting to the ED for shortness of breath and throat pain..  Patient has past medical history:  Hypertension, COPD, essential hypertension, gastroesophageal reflux disease with esophagitis, diverticulosis.  Patient states that he has had a tickle to the back of his throat for a couple of days.  This morning he had a super hard cough causing him to severe throat pain. ("Feels like a bottle brush went through his throat.")  He has pain to upper chest when he swallows.  Patient is concerned that he will have a severe exacerbation of COPD if he does not seek treatment currently.   He has specifically requesting "a steroid shot and an antibiotic shot because oral medications do not work with me.  Patient notes that he has been dealing with a upper mandibular dental infection.  Patient denies any fever, chest pain, chest pressure, indigestion, fever, nausea, vomiting, diarrhea, calf pain, calf tenderness, paroxysmal nocturnal dyspnea, orthopnea.      Arrival mode: Private Vehicle     PCP: Terri Dumont NP     Past Medical History:  Past Medical History:   Diagnosis Date    Anxiety     COPD (chronic obstructive pulmonary disease)     Diverticular disease     Hypertension        Past Surgical History:  Past Surgical History:   Procedure Laterality Date    COLOSTOMY CLOSURE      CORONARY STENT PLACEMENT N/A 2/3/2024    Procedure: INSERTION, STENT, CORONARY ARTERY;  Surgeon: Nilo Chavira MD;  Location: [facility] CATH LAB;  Service: Cardiology; "  Laterality: N/A;    HERNIA REPAIR      INSTANTANEOUS WAVE-FREE RATIO (IFR) N/A 2/3/2024    Procedure: Instantaneous Wave-Free Ratio (IFR);  Surgeon: Nilo Chavira MD;  Location: [facility] CATH LAB;  Service: Cardiology;  Laterality: N/A;    LAPAROSCOPIC COLOSTOMY      LEFT HEART CATHETERIZATION Left 2/3/2024    Procedure: Left heart cath;  Surgeon: Nilo Chavira MD;  Location: [facility] CATH LAB;  Service: Cardiology;  Laterality: Left;    PERCUTANEOUS CORONARY INTERVENTION, ARTERY N/A 2/3/2024    Procedure: Percutaneous coronary intervention;  Surgeon: Nilo Chavira MD;  Location: [facility] CATH LAB;  Service: Cardiology;  Laterality: N/A;         Family History:  Family History   Problem Relation Name Age of Onset    COPD Mother      Heart attack Mother      COPD Father      Heart attack Father         Social History:  Social History     Tobacco Use    Smoking status: Every Day     Current packs/day: 1.50     Average packs/day: 1.5 packs/day for 20.4 years (30.6 ttl pk-yrs)     Types: Cigarettes     Start date: 2/3/2004    Smokeless tobacco: Never   Substance and Sexual Activity    Alcohol use: No    Drug use: No    Sexual activity: Yes     Partners: Female        Review of Systems   Review of Systems   Constitutional:  Negative for fever.   HENT:  Positive for dental problem and sore throat.         Throat pain with swallowing   Respiratory:  Positive for cough. Negative for shortness of breath.    Cardiovascular:  Negative for chest pain.   Gastrointestinal:  Negative for nausea and vomiting.   Genitourinary:  Negative for dysuria.   Musculoskeletal:  Negative for back pain.   Skin:  Negative for rash.   Neurological:  Negative for weakness.   Hematological:  Does not bruise/bleed easily.      Physical Exam     Initial Vitals [07/05/24 0736]   BP Pulse Resp Temp SpO2   (!) 170/100 87 20 98.2 °F (36.8 °C) 95 %      MAP       --          Physical Exam    Nursing Notes and Vital Signs Reviewed.  Constitutional: Patient  is in no apparent distress. Well-developed and well-nourished.  Head: Atraumatic. Normocephalic.  Eyes: PERRL. EOM intact. Conjunctivae are not pale. No scleral icterus.  ENT: Mucous membranes are moist. Oropharynx is clear and symmetric.  Dental in poor repair.  Patient mostly edentulous.   Tympanic membranes pearly gray bilaterally.  There is hyperemia of the nasal mucosa.  On the right upper lateral incisor region, there was swelling to the gum.  Tooth itself is missing.  Gum was tender to palpation.  No trismus.  Neck: Supple. Full ROM. No lymphadenopathy.  No Crepitus.  Cardiovascular: Regular rate. Regular rhythm. No murmurs, rubs, or gallops. Distal pulses are 2+ and symmetric.  Pulmonary/Chest: No respiratory distress. (+) Wheezing bilaterally.  No accessory muscle use.  Abdominal: Soft and non-distended.  There is no tenderness.  No rebound, guarding, or rigidity. Good bowel sounds.  Musculoskeletal: Moves all extremities. No obvious deformities. No edema. No calf tenderness.  Skin: Warm and dry.  Neurological:  Alert, awake, and appropriate.  Normal speech.  No acute focal neurological deficits are appreciated.  Psychiatric: Normal affect. Good eye contact. Appropriate in content.     ED Course   ED Procedures:  Procedures    ED Vital Signs:  Vitals:    07/05/24 0736 07/05/24 0815 07/05/24 0838 07/05/24 0955   BP: (!) 170/100   (!) 154/74   Pulse: 87 70 72 74   Resp: 20 20 20 18   Temp: 98.2 °F (36.8 °C)      TempSrc: Oral      SpO2: 95% 96% 96% 97%   Weight: 97.1 kg (214 lb 1.1 oz)          Abnormal Lab Results:  Labs Reviewed - No data to display     All Lab Results:  None        Imaging Results:  Imaging Results              X-Ray Chest PA And Lateral (Final result)  Result time 07/05/24 08:40:46      Final result by Terence Cross MD (07/05/24 08:40:46)                   Impression:      No acute abnormality.      Electronically signed by: Terence Cross  Date:    07/05/2024  Time:    08:40                Narrative:    EXAMINATION:  XR CHEST PA AND LATERAL    CLINICAL HISTORY:  Cough, unspecified    TECHNIQUE:  PA and lateral views of the chest were performed.    COMPARISON:  Multiple priors    FINDINGS:  The lungs are clear, with normal appearance of pulmonary vasculature and no pleural effusion or pneumothorax.    The cardiac silhouette is normal in size. The hilar and mediastinal contours are unremarkable.    Bones are intact.                                       X-Ray Neck Soft Tissue (Final result)  Result time 07/05/24 08:37:33      Final result by Gilberto Herbert MD (07/05/24 08:37:33)                   Impression:      1.  Negative for acute process.    2.  Incidental findings as noted above.      Electronically signed by: Gilberto Herbert MD  Date:    07/05/2024  Time:    08:37               Narrative:    EXAMINATION:  XR NECK SOFT TISSUE    CLINICAL HISTORY:  upper chest discomfort after a hard cough.;    TECHNIQUE:  AP and lateral soft tissue views the neck were performed.    COMPARISON:  None.    FINDINGS:  The airways are patent.  Epiglottis is normal.  Prevertebral soft tissues are normal thickness.  Lung apices are clear.    Disc height reduction with marginal spondylosis at C4/C5, C5/C6 and C6/C7.  Right neck calcification.  Bruit?  Metallic foreign body along the lateral lower jaw skin surface noted.                                       Type of Interpretation: ED Physician (Independently Interpreted).  Interpretation: Chest x-ray: No pneumo mediastinum.  No infiltrate  Soft tissue neck: Carotid artery calcification.  Metallic foreign body right lateral neck.  No retropharyngeal air.          The Emergency Provider reviewed the vital signs and test results, which are outlined above.     ED Discussion   ED Medication(s):  Medications   albuterol-ipratropium 2.5 mg-0.5 mg/3 mL nebulizer solution 3 mL (3 mLs Nebulization Given 7/5/24 0838)   dexAMETHasone injection 8 mg (8 mg Intramuscular Given  7/5/24 0818)   cefTRIAXone injection 1 g (1 g Intramuscular Given 7/5/24 0848)       ED Course as of 07/06/24 0705   Fri Jul 05, 2024 0827 Spoke with pharmacist.  Patient has allergy to amoxicillin and penicillin.  Although he states that he was told this when he was younger.  Unfortunately no previous record of patient receiving Rocephin.  Although pharmacist believes that administration Rocephin will be safe. [LB]   0924 Discussed findings of calcifications in the right carotid artery.  Discussed recommendations to follow up with his primary care physician for outpatient carotid Doppler.  Discussed retained foreign body right neck.  History of BV injury.  Discussed unable to get MRI secondary to retained foreign body.  Breath sounds clear..  Discussed indications to return emergency department [LB]      ED Course User Index  [LB] Lidia Jeff,             09:24 Reassessment: Dr. Jeff reassessed the pt.  The pt is resting comfortably and is NAD.  Pt states their sx have improved. Discussed test results, shared treatment plan, specific conditions for return, and the need for f/u.  Answered their questions at this time.  Pt understands and agrees to the plan.  The pt has remained hemodynamically stable through ED course and is stable for discharge.    I discussed with patient and/or family/caretaker that evaluation in the ED does not suggest any emergent or life threatening medical conditions requiring immediate intervention beyond what was provided in the ED, and I believe patient is safe for discharge.  Regardless, an unremarkable evaluation in the ED does not preclude the development or presence of a serious of life threatening condition. As such, patient was instructed to return immediately for any worsening or change in current symptoms.     MIPS Measures     Smoker? Yes     Hypertension: History of Hypertension: The patient has elevated blood pressure (higher than 120/80) while being treated in  the ED but has a history of hypertension.     Medical Decision Making                 Medical Decision Making  Differential Diagnosis:  Exacerbation COPD, bronchitis, pneumonia, pneumomediastinum, viral URI    ED course:  After discussing risks benefits steroids, patient given IM injection of dexamethasone, Rocephin, and DuoNeb.  Chest x-ray showed no pneumomediastinum.  Lungs clear.  No infiltrate.  Soft tissue neck did not show any retropharyngeal air.  Re-examination of lungs demonstrated that they were clear.  No respiratory distress.  Patient given prescription for steroid taper.  Instructed not to stop steroids puts go back to his usual 10 mg once taper is done.  Option for doxycycline.    Amount and/or Complexity of Data Reviewed  Radiology: ordered and independent interpretation performed. Decision-making details documented in ED Course.    Risk  Prescription drug management.        Coding    Prescription Management: I performed a review of the patient's current Rx medication list as input by nursing staff.    Discharge Medication List as of 7/5/2024  9:38 AM        START taking these medications    Details   doxycycline (VIBRAMYCIN) 100 MG Cap Take 1 capsule (100 mg total) by mouth 2 (two) times daily. for 7 days, Starting Fri 7/5/2024, Until Fri 7/12/2024, Print      predniSONE (DELTASONE) 10 MG tablet Take 1 tablet (10 mg total) by mouth once daily. Take 4 tabs x 3 days, then take 3 tablets by mouth daily for 3 days, then Take 2 tabs x 3 days, then take your usual dosage of 10 mg per day. for 9 days, Starting Fri 7/5/2024, Until Sun 7/14/2024, Print           CONTINUE these medications which have NOT CHANGED    Details   ALPRAZolam (XANAX) 0.25 MG tablet Take 0.25 mg by mouth., Starting Wed 1/17/2024, Historical Med      aspirin 81 MG Chew Take 1 tablet (81 mg total) by mouth once daily., Starting Thu 2/15/2024, Until Fri 2/14/2025, Normal      atorvastatin (LIPITOR) 80 MG tablet Take 1 tablet (80 mg  "total) by mouth once daily., Starting Mon 4/29/2024, Until Thu 4/24/2025, Normal      isosorbide mononitrate (IMDUR) 30 MG 24 hr tablet Take 1 tablet (30 mg total) by mouth once daily., Starting Mon 4/29/2024, Until Thu 4/24/2025, Normal      losartan (COZAAR) 25 MG tablet Take 1 tablet (25 mg total) by mouth once daily., Starting Thu 2/15/2024, Until Fri 2/14/2025, Normal      metoprolol succinate (TOPROL-XL) 50 MG 24 hr tablet Take 0.5 tablets (25 mg total) by mouth once daily., Starting Mon 4/29/2024, Until Sun 4/19/2026, Normal      prasugreL (EFFIENT) 10 mg Tab Take 1 tablet (10 mg total) by mouth once daily., Starting Thu 2/15/2024, Until Sun 2/9/2025, Normal      albuterol (PROVENTIL/VENTOLIN HFA) 90 mcg/actuation inhaler Inhale 2 puffs into the lungs every 6 (six) hours as needed for Wheezing. Rescue, Historical Med      fluticasone-salmeterol diskus inhaler 250-50 mcg Inhale 1 puff into the lungs 2 (two) times a day., Starting Thu 10/12/2023, Historical Med      meclizine (ANTIVERT) 25 mg tablet Take 25 mg by mouth 2 (two) times daily as needed., Historical Med      mupirocin (BACTROBAN) 2 % ointment Apply topically 3 (three) times daily., Starting Mon 6/10/2024, Normal      nitroGLYCERIN (NITROSTAT) 0.4 MG SL tablet Place 1 tablet (0.4 mg total) under the tongue every 5 (five) minutes as needed for Chest pain (angina)., Starting Sun 2/4/2024, Until Mon 2/3/2025 at 2359, Normal      pantoprazole (PROTONIX) 20 MG tablet Take 20 mg by mouth every morning., Starting Mon 12/6/2021, Historical Med              Discussed case with:N/A    Portions of this note may have been created with voice recognition software. Occasional "wrong-word" or "sound-a-like" substitutions may have occurred due to the inherent limitations of voice recognition software. Please, read the note carefully and recognize, using context, where substitutions have occurred.          Clinical Impression       ICD-10-CM ICD-9-CM   1. COPD with " acute exacerbation  J44.1 491.21   2. Cough  R05.9 786.2   3. Cough  R05.9 786.2   4. Dental abscess  K04.7 522.5        Disposition        Disposition: Discharge to home  Patient condition: Stable      ED Follow-up      Follow-up Information       Terri Dumont NP In 2 days.    Specialty: Family Medicine  Why: Return to emergency department for chest pain, chest pressure, shortness of breath, difficulty breathing, numbness or weakness to 1 side, slurred speech, vision changes/vision loss, or other concerns  Contact information:  [address] [address] [address]  [phone]                                      Lidia Jeff,   07/06/24 0705

## 2024-07-05 NOTE — TELEPHONE ENCOUNTER
----- Message from Sanjiv Alcantara sent at 7/5/2024  4:11 PM CDT -----  Contact: Alyssa(daughter)  Alyssa called in regards to she said that her father (patient) was seen in the ER this morning 7/5/24. He was having trouble breathing. Stated Dr in ER gave him a shot. Sugar was 350 first time second 262 and third breanna 285. She is wondering would the shot make it react. Call back is [phone]

## 2024-07-06 NOTE — ED PROVIDER NOTES
Encounter Date: 7/5/2024       History     Chief Complaint   Patient presents with    Hyperglycemia     Pt reports that his blood glucose level has been elevated, denies hx of DM. States PCP told him to come to ER    Dizziness     Patient is a 69 year male who presents to the emergency department with complaints of hyperglycemia.  Patient stated that he woke up with sore throat and a cough and presented for evaluation.  He states that he was given a steroid shot and antibiotics shot.  He said he started to feel better from the throat standpoint.  He ate some ice cream when he went home.  He then noticed that he felt a little dizzy and checked his glucose and it was in the 300s.  Patient denies a history of diabetes.  States the highest it has ever been was 200.  Not on any diabetes medications.  He came back to the emergency department for evaluation.  No reports of chest pain, shortness of breath, fever, palpitations, syncope.      Review of patient's allergies indicates:   Allergen Reactions    Amoxicillin Itching    Penicillin Other (See Comments)    Penicillins     Adhesive Rash     Past Medical History:   Diagnosis Date    Anxiety     COPD (chronic obstructive pulmonary disease)     Diverticular disease     Hypertension      Past Surgical History:   Procedure Laterality Date    COLOSTOMY CLOSURE      CORONARY STENT PLACEMENT N/A 2/3/2024    Procedure: INSERTION, STENT, CORONARY ARTERY;  Surgeon: Nilo Chavira MD;  Location: [facility] CATH LAB;  Service: Cardiology;  Laterality: N/A;    HERNIA REPAIR      INSTANTANEOUS WAVE-FREE RATIO (IFR) N/A 2/3/2024    Procedure: Instantaneous Wave-Free Ratio (IFR);  Surgeon: Nilo Chavira MD;  Location: [facility] CATH LAB;  Service: Cardiology;  Laterality: N/A;    LAPAROSCOPIC COLOSTOMY      LEFT HEART CATHETERIZATION Left 2/3/2024    Procedure: Left heart cath;  Surgeon: Nilo Chavira MD;  Location: [facility] CATH LAB;  Service: Cardiology;  Laterality: Left;    PERCUTANEOUS  CORONARY INTERVENTION, ARTERY N/A 2/3/2024    Procedure: Percutaneous coronary intervention;  Surgeon: Nilo Chavira MD;  Location: [facility] CATH LAB;  Service: Cardiology;  Laterality: N/A;     Family History   Problem Relation Name Age of Onset    COPD Mother      Heart attack Mother      COPD Father      Heart attack Father       Social History     Tobacco Use    Smoking status: Every Day     Current packs/day: 1.50     Average packs/day: 1.5 packs/day for 20.4 years (30.6 ttl pk-yrs)     Types: Cigarettes     Start date: 2/3/2004    Smokeless tobacco: Never   Substance Use Topics    Alcohol use: No    Drug use: No     Review of Systems   Endocrine:        Hyperglycemia   Neurological:  Positive for dizziness.   All other systems reviewed and are negative.      Physical Exam     Initial Vitals [07/05/24 1757]   BP Pulse Resp Temp SpO2   (!) 151/75 89 18 98.4 °F (36.9 °C) (!) 93 %      MAP       --         Physical Exam    Nursing note and vitals reviewed.  Constitutional: He appears well-developed and well-nourished. No distress.   HENT:   Head: Normocephalic and atraumatic.   Mouth/Throat: Oropharynx is clear and moist. No oropharyngeal exudate.   Eyes: Conjunctivae and EOM are normal. Pupils are equal, round, and reactive to light.   Neck: Neck supple. No tracheal deviation present.   Cardiovascular:  Normal rate, regular rhythm, normal heart sounds and intact distal pulses.           Pulmonary/Chest: Breath sounds normal. No respiratory distress.   Abdominal: Abdomen is soft. He exhibits no distension. There is no abdominal tenderness. There is no rebound and no guarding.   Musculoskeletal:         General: No tenderness or edema. Normal range of motion.      Cervical back: Neck supple.     Neurological: He is alert and oriented to person, place, and time. GCS score is 15. GCS eye subscore is 4. GCS verbal subscore is 5. GCS motor subscore is 6.   No focal deficits   Skin: Skin is warm. No rash noted. No  erythema.   Psychiatric: He has a normal mood and affect. His behavior is normal.         ED Course   Procedures  Labs Reviewed   CBC W/ AUTO DIFFERENTIAL - Abnormal; Notable for the following components:       Result Value    Gran % 81.8 (*)     Lymph % 12.2 (*)     All other components within normal limits   COMPREHENSIVE METABOLIC PANEL - Abnormal; Notable for the following components:    Glucose 160 (*)     All other components within normal limits   POCT GLUCOSE - Abnormal; Notable for the following components:    POCT Glucose 235 (*)     All other components within normal limits   GROUP A STREP, MOLECULAR   SARS-COV-2 RDRP GENE   POCT INFLUENZA A/B MOLECULAR     Results for orders placed or performed during the hospital encounter of 07/05/24   Group A Strep, Molecular    Specimen: Throat   Result Value Ref Range    Group A Strep, Molecular Negative Negative   CBC auto differential   Result Value Ref Range    WBC 8.93 3.90 - 12.70 K/uL    RBC 4.69 4.60 - 6.20 M/uL    Hemoglobin 14.0 14.0 - 18.0 g/dL    Hematocrit 41.4 40.0 - 54.0 %    MCV 88 82 - 98 fL    MCH 29.9 27.0 - 31.0 pg    MCHC 33.8 32.0 - 36.0 g/dL    RDW 14.2 11.5 - 14.5 %    Platelets 213 150 - 450 K/uL    MPV 9.5 9.2 - 12.9 fL    Immature Granulocytes 0.2 0.0 - 0.5 %    Gran # (ANC) 7.3 1.8 - 7.7 K/uL    Immature Grans (Abs) 0.02 0.00 - 0.04 K/uL    Lymph # 1.1 1.0 - 4.8 K/uL    Mono # 0.5 0.3 - 1.0 K/uL    Eos # 0.0 0.0 - 0.5 K/uL    Baso # 0.01 0.00 - 0.20 K/uL    nRBC 0 0 /100 WBC    Gran % 81.8 (H) 38.0 - 73.0 %    Lymph % 12.2 (L) 18.0 - 48.0 %    Mono % 5.6 4.0 - 15.0 %    Eosinophil % 0.1 0.0 - 8.0 %    Basophil % 0.1 0.0 - 1.9 %    Differential Method Automated    Comprehensive metabolic panel   Result Value Ref Range    Sodium 140 136 - 145 mmol/L    Potassium 4.1 3.5 - 5.1 mmol/L    Chloride 106 95 - 110 mmol/L    CO2 23 23 - 29 mmol/L    Glucose 160 (H) 70 - 110 mg/dL    BUN 18 8 - 23 mg/dL    Creatinine 1.2 0.5 - 1.4 mg/dL    Calcium  9.7 8.7 - 10.5 mg/dL    Total Protein 7.0 6.0 - 8.4 g/dL    Albumin 3.9 3.5 - 5.2 g/dL    Total Bilirubin 0.4 0.1 - 1.0 mg/dL    Alkaline Phosphatase 74 55 - 135 U/L    AST 30 10 - 40 U/L    ALT 36 10 - 44 U/L    eGFR >60.0 >60 mL/min/1.73 m^2    Anion Gap 11 8 - 16 mmol/L   POCT COVID-19 Rapid Screening   Result Value Ref Range    POC Rapid COVID Negative Negative     Acceptable Yes    POCT Influenza A/B Molecular   Result Value Ref Range    POC Molecular Influenza A Ag Negative Negative    POC Molecular Influenza B Ag Negative Negative     Acceptable Yes    POCT glucose   Result Value Ref Range    POCT Glucose 235 (H) 70 - 110 mg/dL            Imaging Results    None          Medications   sodium chloride 0.9% bolus 1,000 mL 1,000 mL (0 mLs Intravenous Stopped 7/5/24 2207)     Medical Decision Making  Hyperglycemia in a nondiabetic.  Likely due to the dexamethasone shot he received earlier today.  Glucose treated here in the emergency department With IV fluids.  Initially 200s in triage.  160 by the time he was in the exam room.  Suspect it is even lower after the administration of fluids.  Patient stable for discharge    Amount and/or Complexity of Data Reviewed  External Data Reviewed: notes.     Details: Chart was reviewed from prior presentation to the emergency department earlier today.  Patient was given dexamethasone and Rocephin  Labs: ordered. Decision-making details documented in ED Course.    Risk  OTC drugs.  Prescription drug management.                                      Clinical Impression:  Final diagnoses:  [R42] Dizziness  [R73.9] Hyperglycemia (Primary)  [J02.9] Viral pharyngitis          ED Disposition Condition    Discharge Stable          ED Prescriptions    None       Follow-up Information       Follow up With Specialties Details Why Contact Info    Terri Dumont NP Family Medicine Call   [address] [place] [address] [phone]      [facility]  - Emergency Dept Emergency Medicine  As needed, If symptoms worsen [address]  [facility] [id number] [phone]             Richy Chacon MD  07/05/24 1474

## 2024-09-09 NOTE — DISCHARGE SUMMARY
[facility] Hospital Medicine  Discharge Summary      Patient Name: Eber Wilson  MRN: [id number]  Admission Date: 6/2/2017  Hospital Length of Stay: 2 days  Discharge Date and Time:  06/04/2017 2:31 PM  Attending Physician: Juwan Priest MD   Discharging Provider: Valeria Mariscal NP  Primary Care Provider: JOHN Corley      HPI:    Eber Wilson is a 62 y.o. male patient who presented to the Emergency Department for complaints of shortness of breath.  The patient noted that he's been having some shortness of breath over the last week.  The patient was seen by his PCP a few days ago and was prescribed a Z-Carter, Ventolin inhaler, and prednisone.  The patient noted that he was finished his antibiotics and has given himself an inhaler treatments at home.  The patient noted that the shortness of breath has not improved very much and has back here in the ED for further evaluation treatment.  It is noted that on triage the patient has SPO2 value of 88% on room air.  A repeat SPO2 value done while the patient is supine in bed is 93%    * No surgery found *      Indwelling Lines/Drains at time of discharge:   Lines/Drains/Airways          No matching active lines, drains, or airways        Hospital Course:   Pt was admitted with COPD Exacerbation. Interventions included supplemental oxygen, IV corticosteroids, SHRUTI, LABA and IV antibiotics. Scheduled Xanax assisted with anxiety. Pt was advised regarding smoking cessation. SOB and URI symptoms improved. He was prescribed Levaquin and a longer steroid taper. He did qualify for home oxygen which was arranged and O2 was delivered to hospital prior to discharge. He was advised to follow up with PCP and obtain evaluation by Pulmonolologist.       Consults:   Consults         Status Ordering Provider     Inpatient consult to Social Work  Once     Provider:  (Not yet assigned)    Completed VALERIA MARISCAL          Significant Diagnostic  Problem: [place] (,NICU)  Goal: Signs and Symptoms of Listed Potential Problems Will be Absent or Manageable ()  Outcome: Ongoing (interventions implemented as appropriate)    Problem: Breastfeeding (Adult,NICU,[place],Obstetrics,Pediatric)  Goal: Signs and Symptoms of Listed Potential Problems Will be Absent or Manageable (Breastfeeding)  Outcome: Ongoing (interventions implemented as appropriate)    Problem: Patient Care Overview (Infant)  Goal: Plan of Care Review  Outcome: Ongoing (interventions implemented as appropriate)    17 0400   Coping/Psychosocial Response   Care Plan Reviewed With mother   Patient Care Overview   Progress improving       Goal: Infant Individualization and Mutuality  Outcome: Ongoing (interventions implemented as appropriate)  Goal: Discharge Needs Assessment  Outcome: Ongoing (interventions implemented as appropriate)       "Studies:   Imaging Results          X-Ray Chest PA And Lateral (Final result)  Result time 06/02/17 13:38:24    Final result by Kofi Poole MD (06/02/17 13:38:24)                 Impression:     Negative      Electronically signed by: KOFI POOLE MD  Date:     06/02/17  Time:    13:38              Narrative:    History: Dyspnea    Normal heart size. Clear lungs.                              Pending Diagnostic Studies:     None        Final Active Diagnoses:    Diagnosis Date Noted POA    PRINCIPAL PROBLEM:  COPD exacerbation [J44.1] 06/02/2017 Yes    Hypoxia [R09.02] 06/04/2017 Yes    Tobacco abuse [Z72.0] 06/03/2017 Yes    Anxiety [F41.9] 06/03/2017 Yes      Problems Resolved During this Admission:    Diagnosis Date Noted Date Resolved POA      No new Assessment & Plan notes have been filed under this hospital service since the last note was generated.  Service: Hospital Medicine      Discharged Condition: good    Disposition: Home or Self Care    Follow Up:  Follow-up Information     JOHN Corley In 3 days.    Specialty:  Family Medicine  Why:  Hosp F/U - COPD Exacerbation and Anxiety  Contact information:  [address] [facility] [address] [phone]             Pulmonology In 2 weeks.    Why:  Please follow up with lung doctor regarding COPD and need for oxygen               Patient Instructions:     OXYGEN FOR HOME USE   Order Specific Question Answer Comments   Liter Flow 2    Duration Continuous    Qualifying SpO2: 88    Testing done at: Exercise/Activity    Route nasal cannula    Portable mode: pulse dose acceptable    Device home concentrator with portable unit    Length of need (in months): 99 mos    Patient condition with qualifying saturation COPD    Height: 5' 8" (1.727 m)    Weight: 96.3 kg (212 lb 3.2 oz)    Does patient have medical equipment at home? none    Alternative treatment measures have been tried or considered and deemed clinically ineffective. Yes  "     Diet general     Activity as tolerated     Call MD for:  temperature >100.4     Call MD for:  difficulty breathing or increased cough       Medications:  Reconciled Home Medications:   Current Discharge Medication List      START taking these medications    Details   guaifenesin (MUCINEX) 600 mg 12 hr tablet Take 1 tablet (600 mg total) by mouth 2 (two) times daily as needed for Congestion.      levoFLOXacin (LEVAQUIN) 750 MG tablet Take 1 tablet (750 mg total) by mouth once daily.  Qty: 7 tablet, Refills: 0         CONTINUE these medications which have CHANGED    Details   alprazolam (XANAX) 0.5 MG tablet Take 1 tablet (0.5 mg total) by mouth 3 (three) times daily as needed for Anxiety.  Qty: 12 tablet, Refills: 0      predniSONE (DELTASONE) 10 MG tablet Take 4 tabs x 4 days, 3 tabs x 3 days, 2 x 2, 1 x 1 then stop  Qty: 32 tablet, Refills: 0         CONTINUE these medications which have NOT CHANGED    Details   albuterol (VENTOLIN HFA) 90 mcg/actuation inhaler Inhale 2 puffs into the lungs every 6 (six) hours as needed for Wheezing. Rescue      lisinopril 10 MG tablet Take 10 mg by mouth once daily.      fluticasone-vilanterol (BREO ELLIPTA) 100-25 mcg/dose diskus inhaler Inhale 1 puff into the lungs once daily. Controller         STOP taking these medications       azithromycin (Z-KIAN) 250 MG tablet Comments:   Reason for Stopping:         budesonide-formoterol 160-4.5 mcg (SYMBICORT) 160-4.5 mcg/actuation HFAA Comments:   Reason for Stopping:             Time spent on the discharge of patient: > 30 minutes    Amber Mariscal NP  Department of Hospital Medicine  [facility]   English

## 2024-10-28 DIAGNOSIS — I25.110 CORONARY ARTERY DISEASE INVOLVING NATIVE CORONARY ARTERY OF NATIVE HEART WITH UNSTABLE ANGINA PECTORIS: ICD-10-CM

## 2024-10-28 DIAGNOSIS — I10 HYPERTENSION, UNSPECIFIED TYPE: Primary | ICD-10-CM

## 2024-10-31 ENCOUNTER — HOSPITAL ENCOUNTER (OUTPATIENT)
Dept: CARDIOLOGY | Facility: HOSPITAL | Age: 69
Discharge: HOME | End: 2024-10-31
Attending: STUDENT IN AN ORGANIZED HEALTH CARE EDUCATION/TRAINING PROGRAM
Payer: MEDICARE

## 2024-10-31 ENCOUNTER — OFFICE VISIT (OUTPATIENT)
Dept: CARDIOLOGY | Facility: CLINIC | Age: 69
End: 2024-10-31
Payer: MEDICARE

## 2024-10-31 VITALS
HEART RATE: 72 BPM | SYSTOLIC BLOOD PRESSURE: 140 MMHG | HEIGHT: 67 IN | WEIGHT: 212.94 LBS | DIASTOLIC BLOOD PRESSURE: 98 MMHG | BODY MASS INDEX: 33.42 KG/M2

## 2024-10-31 DIAGNOSIS — J44.9 CHRONIC OBSTRUCTIVE PULMONARY DISEASE, UNSPECIFIED COPD TYPE: ICD-10-CM

## 2024-10-31 DIAGNOSIS — R06.02 SOB (SHORTNESS OF BREATH): ICD-10-CM

## 2024-10-31 DIAGNOSIS — I10 HYPERTENSION, UNSPECIFIED TYPE: Primary | ICD-10-CM

## 2024-10-31 DIAGNOSIS — Z72.0 TOBACCO ABUSE: ICD-10-CM

## 2024-10-31 DIAGNOSIS — I21.4 NSTEMI (NON-ST ELEVATED MYOCARDIAL INFARCTION): ICD-10-CM

## 2024-10-31 DIAGNOSIS — I10 HYPERTENSION, UNSPECIFIED TYPE: ICD-10-CM

## 2024-10-31 DIAGNOSIS — I25.110 CORONARY ARTERY DISEASE INVOLVING NATIVE CORONARY ARTERY OF NATIVE HEART WITH UNSTABLE ANGINA PECTORIS: ICD-10-CM

## 2024-10-31 DIAGNOSIS — I25.2 HISTORY OF NON-ST ELEVATION MYOCARDIAL INFARCTION (NSTEMI): ICD-10-CM

## 2024-10-31 LAB
OHS QRS DURATION: 92 MS
OHS QTC CALCULATION: 438 MS

## 2024-10-31 PROCEDURE — 93005 ELECTROCARDIOGRAM TRACING: CPT

## 2024-10-31 PROCEDURE — 99999 PR PBB SHADOW E&M-EST. PATIENT-LVL III: CPT | Mod: PBBFAC,,, | Performed by: STUDENT IN AN ORGANIZED HEALTH CARE EDUCATION/TRAINING PROGRAM

## 2024-10-31 PROCEDURE — 93010 ELECTROCARDIOGRAM REPORT: CPT | Mod: ,,, | Performed by: INTERNAL MEDICINE

## 2024-10-31 RX ORDER — LOSARTAN POTASSIUM 50 MG/1
50 TABLET ORAL DAILY
Qty: 90 TABLET | Refills: 3 | Status: SHIPPED | OUTPATIENT
Start: 2024-10-31 | End: 2025-10-31

## 2024-11-17 NOTE — ED NOTES
Diane Ruiz is a 21 year old female  Patient's last menstrual period was 10/25/2024 (approximate).   Chief Complaint   Patient presents with    Consult     NEW PATIENT, DISCUSS HYMEN THICKNESS -- attempt at coitus too painful. Uses pads only. Wondering if needs procedure done. In past on loestrin -- tried 2 packs w/ xs blunted mood. Never active    Gyn Exam     New pt   .    OBSTETRICS HISTORY:     OB History    Para Term  AB Living   0 0 0 0 0 0   SAB IAB Ectopic Multiple Live Births   0 0 0 0 0       GYNE HISTORY:     Periods regular monthly      BCM:  None    History   Sexual Activity    Sexual activity: Not on file                    No data to display                  MEDICAL HISTORY:     No past medical history on file.  Past Surgical History:   Procedure Laterality Date    Appendectomy  2024     OB History    Para Term  AB Living   0 0 0 0 0 0   SAB IAB Ectopic Multiple Live Births   0 0 0 0 0        SOCIAL HISTORY:     Tobacco Use: Low Risk  (2024)    Patient History     Smoking Tobacco Use: Never     Smokeless Tobacco Use: Never     Passive Exposure: Not on file       FAMILY HISTORY:     Family History   Problem Relation Age of Onset    Diabetes Maternal Grandmother     Hypertension Maternal Grandmother     Breast Cancer Maternal Grandmother     Cancer Maternal Grandmother         lung cancer --  nonsmoker    Heart Attack Maternal Grandfather        MEDICATIONS:       Current Outpatient Medications:     fluticasone propionate 50 MCG/ACT Nasal Suspension, 2 sprays by Each Nare route daily., Disp: 16 g, Rfl: 0    ALLERGIES:     Allergies[1]      REVIEW OF SYSTEMS:     Constitutional:    denies fever / chills  Eyes:     denies blurred or double vision  Cardiovascular:  denies chest pain or palpitations  Respiratory:    denies shortness of breath  Gastrointestinal:  denies severe abdominal pain, frequent diarrhea or constipation, nausea / vomiting  Genitourinary:   PT has odor of ETOH onboard upon arrival to ER.      denies dysuria, bothersome incontinence  Skin/Breast:   denies any breast pain, lumps, or discharge  Neurological:    denies frequent severe headaches  Psychiatric:   denies depression or anxiety, thoughts of harming self or others  Heme/Lymph:    denies easy bruising or bleeding      PHYSICAL EXAM:     Blood pressure 104/72, pulse 89, weight 89 lb 6.4 oz (40.6 kg), last menstrual period 10/25/2024.  Constitutional:  well developed, well nourished  Head/Face:  normocephalic  Neck/Thyroid: thyroid symmetric, no thyromegaly, no nodules, no adenopathy  Lymphatic: no abnormal supraclavicular or axillary adenopathy is noted  Breast:   normal without palpable masses, tenderness, asymmetry, nipple discharge, nipple retraction or skin changes  Abdomen:   soft, nontender, nondistended, no masses  Skin/Hair:  no unusual rashes or bruises  Extremities:  no edema, no cyanosis  Psychiatric:   oriented to time, place, person and situation. Appropriate mood and affect    Pelvic Exam:  External Genitalia:  normal appearance, hair distribution, and no lesions  Urethral Meatus:   normal in size, location, without lesions and prolapse  Bladder:    no fullness, masses or tenderness  Vagina:    normal appearance without lesions, no abnormal discharge (+) intact patent hymen  Cervix:    normal without tenderness on motion  Uterus:    normal in size, contour, position, mobility, without tenderness  Adnexa:   normal without masses or tenderness  Perineum:   normal  Anus: no hemorroids         ASSESSMENT & PLAN:     Diane was seen today for consult and gyn exam.    Diagnoses and all orders for this visit:    Encounter for gynecological examination without abnormal finding    Screening for malignant neoplasm of cervix  -     ThinPrep PAP with HPV Reflex Request B; Future  -     ThinPrep PAP with HPV Reflex Request B  -     ThinPrep PAP with HPV Reflex Request      Reassured pt no procedure warrantied. Pap done today    SUMMARY:  PAP:  next  pap today per ASCCP guidelines.  BCM: None  STD screening: No, condoms encouraged  Gardasil: unknown  HM updated  Depression screen:   Depression Screening (PHQ-2/PHQ-9): Over the LAST 2 WEEKS   Little interest or pleasure in doing things: Not at all    Feeling down, depressed, or hopeless: Not at all    PHQ-2 SCORE: 0          FOLLOWUP:     Return in about 1 year (around 11/15/2025) for annual gyne exam.    Note to patient and family:  The 21st Century Cures Act makes medical notes available to patients in the interest of transparency.  However, please be advised that this is a medical document.  It is intended as a peer to peer communication.  It is written in medical language and may contain abbreviations or verbiage that are technical and unfamiliar.  It may appear blunt or direct.  Medical documents are intended to carry relevant information, facts as evident, and the clinical opinion of the practitioner.       [1]   Allergies  Allergen Reactions    Doxycycline HIVES

## 2024-12-17 DIAGNOSIS — I25.110 CORONARY ARTERY DISEASE INVOLVING NATIVE CORONARY ARTERY OF NATIVE HEART WITH UNSTABLE ANGINA PECTORIS: ICD-10-CM

## 2024-12-17 DIAGNOSIS — I21.4 NSTEMI (NON-ST ELEVATED MYOCARDIAL INFARCTION): ICD-10-CM

## 2024-12-17 DIAGNOSIS — I10 HYPERTENSION, UNSPECIFIED TYPE: ICD-10-CM

## 2024-12-17 RX ORDER — METOPROLOL SUCCINATE 50 MG/1
TABLET, EXTENDED RELEASE ORAL
Qty: 45 TABLET | Refills: 0 | Status: SHIPPED | OUTPATIENT
Start: 2024-12-17

## 2024-12-19 ENCOUNTER — TELEPHONE (OUTPATIENT)
Dept: CARDIOLOGY | Facility: CLINIC | Age: 69
End: 2024-12-19
Payer: MEDICARE

## 2024-12-19 NOTE — TELEPHONE ENCOUNTER
Attempted to call pt back to inform him a refill was sent to [facility] on 12/17/24. Unable to reach pt, LVM    ----- Message from Amber sent at 12/19/2024 10:05 AM CST -----  Type: Return Call    Who Called:Pt  Would the patient rather a call back or a response via [facility]? Call  Best Call Back Number:[phone]  Additional Information: pt states the metropolol is almost out, he only has about 6 pills left and needs directions on what to do. Please contact pt to give instructions or send refill to pharmacy

## 2025-01-25 DIAGNOSIS — I21.4 NSTEMI (NON-ST ELEVATED MYOCARDIAL INFARCTION): ICD-10-CM

## 2025-01-25 DIAGNOSIS — I10 HYPERTENSION, UNSPECIFIED TYPE: ICD-10-CM

## 2025-01-25 DIAGNOSIS — I25.110 CORONARY ARTERY DISEASE INVOLVING NATIVE CORONARY ARTERY OF NATIVE HEART WITH UNSTABLE ANGINA PECTORIS: ICD-10-CM

## 2025-01-25 RX ORDER — PRASUGREL 10 MG/1
TABLET, FILM COATED ORAL
Qty: 90 TABLET | Refills: 2 | Status: SHIPPED | OUTPATIENT
Start: 2025-01-25

## 2025-02-24 DIAGNOSIS — I25.110 CORONARY ARTERY DISEASE INVOLVING NATIVE CORONARY ARTERY OF NATIVE HEART WITH UNSTABLE ANGINA PECTORIS: ICD-10-CM

## 2025-02-24 DIAGNOSIS — I10 HYPERTENSION, UNSPECIFIED TYPE: ICD-10-CM

## 2025-02-24 DIAGNOSIS — I21.4 NSTEMI (NON-ST ELEVATED MYOCARDIAL INFARCTION): ICD-10-CM

## 2025-02-24 RX ORDER — PRASUGREL 10 MG/1
TABLET, FILM COATED ORAL
Qty: 90 TABLET | Refills: 1 | Status: SHIPPED | OUTPATIENT
Start: 2025-02-24

## 2025-03-06 ENCOUNTER — PATIENT MESSAGE (OUTPATIENT)
Dept: ADMINISTRATIVE | Facility: HOSPITAL | Age: 70
End: 2025-03-06
Payer: MEDICARE

## 2025-03-27 ENCOUNTER — HOSPITAL ENCOUNTER (EMERGENCY)
Facility: HOSPITAL | Age: 70
Discharge: HOME | End: 2025-03-27
Attending: EMERGENCY MEDICINE
Payer: MEDICARE

## 2025-03-27 VITALS
RESPIRATION RATE: 20 BRPM | TEMPERATURE: 98 F | SYSTOLIC BLOOD PRESSURE: 153 MMHG | HEART RATE: 85 BPM | BODY MASS INDEX: 35.19 KG/M2 | DIASTOLIC BLOOD PRESSURE: 95 MMHG | WEIGHT: 224.19 LBS | OXYGEN SATURATION: 94 % | HEIGHT: 67 IN

## 2025-03-27 DIAGNOSIS — M54.16 LUMBAR RADICULOPATHY: Primary | ICD-10-CM

## 2025-03-27 PROCEDURE — 99284 EMERGENCY DEPT VISIT MOD MDM: CPT | Mod: 25,ER

## 2025-03-27 PROCEDURE — 25000003 PHARM REV CODE 250: Mod: ER | Performed by: REGISTERED NURSE

## 2025-03-27 PROCEDURE — 63600175 PHARM REV CODE 636 W HCPCS: Mod: ER | Performed by: REGISTERED NURSE

## 2025-03-27 PROCEDURE — 96372 THER/PROPH/DIAG INJ SC/IM: CPT | Performed by: REGISTERED NURSE

## 2025-03-27 RX ORDER — METHOCARBAMOL 500 MG/1
500 TABLET, FILM COATED ORAL
Status: COMPLETED | OUTPATIENT
Start: 2025-03-27 | End: 2025-03-27

## 2025-03-27 RX ORDER — MORPHINE SULFATE 4 MG/ML
2 INJECTION, SOLUTION INTRAMUSCULAR; INTRAVENOUS
Status: COMPLETED | OUTPATIENT
Start: 2025-03-27 | End: 2025-03-27

## 2025-03-27 RX ORDER — HYDROCODONE BITARTRATE AND ACETAMINOPHEN 10; 325 MG/1; MG/1
1 TABLET ORAL EVERY 6 HOURS PRN
Qty: 12 TABLET | Refills: 0 | Status: SHIPPED | OUTPATIENT
Start: 2025-03-27

## 2025-03-27 RX ORDER — ONDANSETRON HYDROCHLORIDE 2 MG/ML
4 INJECTION, SOLUTION INTRAVENOUS
Status: COMPLETED | OUTPATIENT
Start: 2025-03-27 | End: 2025-03-27

## 2025-03-27 RX ORDER — METHOCARBAMOL 500 MG/1
500 TABLET, FILM COATED ORAL 3 TIMES DAILY
Qty: 15 TABLET | Refills: 0 | Status: SHIPPED | OUTPATIENT
Start: 2025-03-27 | End: 2025-04-01

## 2025-03-27 RX ADMIN — MORPHINE SULFATE 2 MG: 4 INJECTION INTRAVENOUS at 12:03

## 2025-03-27 RX ADMIN — METHOCARBAMOL 500 MG: 500 TABLET ORAL at 12:03

## 2025-03-27 RX ADMIN — ONDANSETRON 4 MG: 2 INJECTION INTRAMUSCULAR; INTRAVENOUS at 12:03

## 2025-03-27 NOTE — ED PROVIDER NOTES
Encounter Date: 3/27/2025       History     Chief Complaint   Patient presents with    Hip Pain     Right hip pain     70-year-old male presents emergency department complaints of right hip pain.  Patient states pain began 5 weeks ago after a slip trip fall.  Patient reports the pain radiates from the back down his right hip.  He denies any saddle anesthesia, fever, chills, bowel bladder incontinence or any other symptoms    The history is provided by the patient.     Review of patient's allergies indicates:   Allergen Reactions    Amoxicillin Itching    Penicillin Other (See Comments)    Penicillins     Adhesive Rash     Past Medical History:   Diagnosis Date    Anxiety     COPD (chronic obstructive pulmonary disease)     Diverticular disease     Hypertension      Past Surgical History:   Procedure Laterality Date    COLOSTOMY CLOSURE      CORONARY STENT PLACEMENT N/A 2/3/2024    Procedure: INSERTION, STENT, CORONARY ARTERY;  Surgeon: Nilo Chavira MD;  Location: [facility] CATH LAB;  Service: Cardiology;  Laterality: N/A;    HERNIA REPAIR      INSTANTANEOUS WAVE-FREE RATIO (IFR) N/A 2/3/2024    Procedure: Instantaneous Wave-Free Ratio (IFR);  Surgeon: Nilo Chavira MD;  Location: [facility] CATH LAB;  Service: Cardiology;  Laterality: N/A;    LAPAROSCOPIC COLOSTOMY      LEFT HEART CATHETERIZATION Left 2/3/2024    Procedure: Left heart cath;  Surgeon: Nilo Chavira MD;  Location: [facility] CATH LAB;  Service: Cardiology;  Laterality: Left;    PERCUTANEOUS CORONARY INTERVENTION, ARTERY N/A 2/3/2024    Procedure: Percutaneous coronary intervention;  Surgeon: Nilo Chavira MD;  Location: [facility] CATH LAB;  Service: Cardiology;  Laterality: N/A;     Family History   Problem Relation Name Age of Onset    COPD Mother      Heart attack Mother      COPD Father      Heart attack Father       Social History[1]  Review of Systems   Constitutional:  Negative for fever.   HENT:  Negative for sore throat.    Respiratory:  Negative for  shortness of breath.    Cardiovascular:  Negative for chest pain.   Gastrointestinal:  Negative for nausea.   Genitourinary:  Negative for dysuria.   Musculoskeletal:  Positive for back pain.        +right hip pain   Skin:  Negative for rash.   Neurological:  Negative for weakness.   Hematological:  Does not bruise/bleed easily.   All other systems reviewed and are negative.      Physical Exam     Initial Vitals [03/27/25 1209]   BP Pulse Resp Temp SpO2   (!) 196/115 92 20 97.7 °F (36.5 °C) (!) 94 %      MAP       --         Physical Exam    Constitutional: He appears well-developed and well-nourished. No distress.   HENT:   Head: Normocephalic and atraumatic.   Nose: Nose normal. Mouth/Throat: Uvula is midline and oropharynx is clear and moist.   Eyes: Conjunctivae and EOM are normal. Pupils are equal, round, and reactive to light.   Neck: Neck supple.   Normal range of motion.  Cardiovascular:  Normal rate and regular rhythm.           Pulmonary/Chest: Effort normal and breath sounds normal. No respiratory distress. He has no decreased breath sounds. He has no wheezes. He has no rales.   Abdominal: Abdomen is soft. Bowel sounds are normal. There is no abdominal tenderness.   Musculoskeletal:         General: Normal range of motion.      Cervical back: Normal range of motion and neck supple.      Left hip: Tenderness present.      Comments: Tenderness to the lateral right hip     Neurological: He is alert and oriented to person, place, and time. He has normal strength. GCS eye subscore is 4. GCS verbal subscore is 5. GCS motor subscore is 6.   Skin: Skin is warm and dry. Capillary refill takes less than 2 seconds. No rash noted.   Psychiatric: He has a normal mood and affect. His speech is normal and behavior is normal.         ED Course   Procedures  Labs Reviewed   HEPATITIS C ANTIBODY   HEP C VIRUS HOLD SPECIMEN   HIV 1 / 2 ANTIBODY          Imaging Results              X-Ray Hip 2 or 3 views Right with Pelvis  when performed (Final result)  Result time 03/27/25 12:50:31      Final result by Gilberto Herbert MD (03/27/25 12:50:31)                   Impression:      1.  Negative for acute process.    2.  Stable findings as noted above.      Electronically signed by: Gilberto Herbert MD  Date:    03/27/2025  Time:    12:50               Narrative:    EXAMINATION:  XR HIP WITH PELVIS WHEN PERFORMED 2 OR 3 VIEWS RIGHT    CLINICAL HISTORY:  right hip pain;    TECHNIQUE:  AP view of the pelvis and frog leg lateral view of the right hip were performed.    COMPARISON:  Abdomen and pelvis CT scan from September 30, 2017    FINDINGS:  The femoral heads are well centered on the acetabula.  Hip joints are well maintained.  Acetabular spurring again seen.  Enthesopathic changes involve the pelvis and proximal femurs.  Right os acetabula.    Similar degree of degenerative changes involving the lower lumbar spine.    Negative for fracture or dislocation.    Normal bowel gas pattern.  Stable clips overlying the sacrum.  There are phleboliths versus ureteroliths some overlying the pelvis.  Prostate calcifications again seen.  Stable vascular calcifications.                                       Medications   ondansetron injection 4 mg (4 mg Intramuscular Given 3/27/25 1229)   methocarbamoL tablet 500 mg (500 mg Oral Given 3/27/25 1228)   morphine injection 2 mg (2 mg Intramuscular Given 3/27/25 1229)     Medical Decision Making  Amount and/or Complexity of Data Reviewed  Radiology: ordered.     Details: Negative hip series    Risk  Prescription drug management.  Risk Details: I discussed with patient and/or family/caretaker that evaluation in the ED does not suggest any emergent or life threatening medical conditions requiring immediate intervention beyond what was provided in the ED, and I believe patient is safe for discharge.  Regardless, an unremarkable evaluation in the ED does not preclude the development or presence of a serious of  life threatening condition. As such, patient was instructed to return immediately for any worsening or change in current symptoms.                                        Clinical Impression:  Final diagnoses:  [M54.16] Lumbar radiculopathy (Primary)          ED Disposition Condition    Discharge Stable          ED Prescriptions       Medication Sig Dispense Start Date End Date Auth. Provider    HYDROcodone-acetaminophen (NORCO)  mg per tablet Take 1 tablet by mouth every 6 (six) hours as needed for Pain. 12 tablet 3/27/2025 -- Shamir Black Jr., FNP    methocarbamoL (ROBAXIN) 500 MG Tab Take 1 tablet (500 mg total) by mouth 3 (three) times daily. for 5 days 15 tablet 3/27/2025 4/1/2025 Shamir Black Jr., FNP          Follow-up Information       Follow up With Specialties Details Why Contact Info    Terri Dumont NP Family Medicine In 1 week  [address] [address] [id number] [phone]                 [1]   Social History  Tobacco Use    Smoking status: Every Day     Current packs/day: 1.50     Average packs/day: 1.5 packs/day for 21.1 years (31.7 ttl pk-yrs)     Types: Cigarettes     Start date: 2/3/2004    Smokeless tobacco: Never   Substance Use Topics    Alcohol use: No    Drug use: No        Shamir Black Jr., FNP  03/27/25 1863

## 2025-04-03 ENCOUNTER — PATIENT OUTREACH (OUTPATIENT)
Dept: ADMINISTRATIVE | Facility: HOSPITAL | Age: 70
End: 2025-04-03
Payer: MEDICARE

## 2025-04-07 ENCOUNTER — TELEPHONE (OUTPATIENT)
Dept: CARDIOLOGY | Facility: CLINIC | Age: 70
End: 2025-04-07
Payer: MEDICARE

## 2025-04-07 NOTE — TELEPHONE ENCOUNTER
Contacted pt to get him scheduled for a BP check up this week. Pt is scheduled 04/11/25 w/ THOMAS De León. Requested he bring his BP cuff and log w/ him to the appt. Pt vu w/o q/c    ---- Outgoing ----   Called pt back regarding medications. Pt states his BP has been running very high - around 220's/100's - and has been asymptomatic. Confirmed pt is taking Losartan 50mg, Toprol-XL 25mg, and Imdur 30mg. Pt does not know if Dr. Alfaro increased his losartan at last visit. Informed him it was increased from 25mg to 50mg. Informed him I will call him w/ any recommendations or changes. Pt vu w/o q/c    ----- Message from Med Assistant Hanna sent at 4/7/2025 10:45 AM CDT -----  Contact: [phone]  This pt has never seen Dr Alonzo.  ----- Message -----  From: Juana Echeverria  Sent: 4/7/2025   8:37 AM CDT  To: Cheri WILBURN Staff; Claudette Marcos Staff    Type:  Needs Medical AdviceWho Called: wife Symptoms (please be specific): BP medication  Pharmacy name and phone #:  [facility] [place] [address] [id number] [place] [address]Nolqj: [phone] Fax: [phone] the patient rather a call back or a response via [facility]? Call back Best Call Back Number: [phone] Information: calling stating BP is elevated , wanting to know if med can be increased dosage

## 2025-04-11 ENCOUNTER — OFFICE VISIT (OUTPATIENT)
Dept: CARDIOLOGY | Facility: CLINIC | Age: 70
End: 2025-04-11
Payer: MEDICARE

## 2025-04-11 VITALS
HEIGHT: 67 IN | SYSTOLIC BLOOD PRESSURE: 148 MMHG | DIASTOLIC BLOOD PRESSURE: 92 MMHG | BODY MASS INDEX: 36.22 KG/M2 | OXYGEN SATURATION: 95 % | HEART RATE: 96 BPM | WEIGHT: 230.81 LBS

## 2025-04-11 DIAGNOSIS — R73.03 PREDIABETES: ICD-10-CM

## 2025-04-11 DIAGNOSIS — I10 HYPERTENSION, UNSPECIFIED TYPE: ICD-10-CM

## 2025-04-11 DIAGNOSIS — Z72.0 TOBACCO ABUSE: ICD-10-CM

## 2025-04-11 DIAGNOSIS — K21.9 GASTROESOPHAGEAL REFLUX DISEASE WITHOUT ESOPHAGITIS: ICD-10-CM

## 2025-04-11 DIAGNOSIS — I25.110 CORONARY ARTERY DISEASE INVOLVING NATIVE CORONARY ARTERY OF NATIVE HEART WITH UNSTABLE ANGINA PECTORIS: Primary | ICD-10-CM

## 2025-04-11 DIAGNOSIS — J44.9 CHRONIC OBSTRUCTIVE PULMONARY DISEASE, UNSPECIFIED COPD TYPE: ICD-10-CM

## 2025-04-11 DIAGNOSIS — E66.01 SEVERE OBESITY (BMI 35.0-39.9) WITH COMORBIDITY: ICD-10-CM

## 2025-04-11 DIAGNOSIS — E78.5 HYPERLIPIDEMIA, UNSPECIFIED HYPERLIPIDEMIA TYPE: ICD-10-CM

## 2025-04-11 DIAGNOSIS — F41.9 ANXIETY: ICD-10-CM

## 2025-04-11 PROCEDURE — 99999 PR PBB SHADOW E&M-EST. PATIENT-LVL IV: CPT | Mod: PBBFAC,,,

## 2025-04-11 RX ORDER — LOSARTAN POTASSIUM 50 MG/1
100 TABLET ORAL DAILY
Qty: 60 TABLET | Refills: 0 | Status: SHIPPED | OUTPATIENT
Start: 2025-04-11 | End: 2025-05-11

## 2025-04-11 NOTE — ASSESSMENT & PLAN NOTE
Continue with current meds  Asked about increasing dosage, discussed follow up with PCP  He is worried this could also contribute to his elevated BP readings

## 2025-04-11 NOTE — ASSESSMENT & PLAN NOTE
Previous lipids not at goal  Continue with statin, low fat diet   Repeat lipids should be soon  Consider additional therapy if not at goal

## 2025-04-11 NOTE — PROGRESS NOTES
Subjective:   Patient ID:  Eber Wilson is a 70 y.o. male who presents for evaluation of Shortness of Breath      HPI 71 y/o male with PMHx of COPD, anxiety, HTN, CAD, GERD, prediabetes, and tobacco use who presents for cardiac follow up.     2/3/2024  past medical history of Anxiety, COPD, HTN (white coat syndrome), tobacco abuse who comes in with bilateral arm pain and chest discomfort that occurred the day PTA. Reports lifting groceries and started having arm pain that became intermittent, had some chest discomfort and SOB. He went to sleep but symptoms did not improve, took 2 ASA then went to ED. Initial troponin 0.48 and trended up to 6.2.  Placed on heparin infusion  Given nitropaste, morphine and metoprolol   EKG with NSR and inferior infarct  Currently without symptoms at rest  Current daily smoker  Echo pending  Reports significant family h/o CAD/MI/CABG           4/5/24  Comes in with wife   [facility] 2/24  Mid LAD stent placement seen on heart cath, also with 60-70 % osatialiramus disease +iFR (being treated medically for now), 50% mid RCA  Has been doing  Trying to quit smoking, down 75%  Feeling great   No longer with balancing issues  No chest pain, SOB        10/31/24  Lifts heavy objects for work  Doing well overall   No falls  Dizziness spells have improved   Went down to 50% with smoking   BP high today  Good appetite   Weight 228 ->212 lbs now      No chest pain, SOB, palpitations         EKG 10/31/24 NSR, PACs, inferior infarct   EKG 2/3/24 NSR, sinus arrthymia, septal infarct, cannot r/o inferior infarct     4/11/2025  BP has been too high at home  Checks BP at home, runs 140s or higher all the time   Asking if needs increase in xanax given anxiety and factor of high BP  Fairly active   Has not used Nitro   Anibal chest pain, MOTLEY, LH, dizzy, syncope      Past Medical History:   Diagnosis Date    Anxiety     COPD (chronic obstructive pulmonary disease)     Diverticular disease     Hypertension         Past Surgical History:   Procedure Laterality Date    COLOSTOMY CLOSURE      CORONARY STENT PLACEMENT N/A 2/3/2024    Procedure: INSERTION, STENT, CORONARY ARTERY;  Surgeon: Nilo Chavira MD;  Location: [facility] CATH LAB;  Service: Cardiology;  Laterality: N/A;    HERNIA REPAIR      INSTANTANEOUS WAVE-FREE RATIO (IFR) N/A 2/3/2024    Procedure: Instantaneous Wave-Free Ratio (IFR);  Surgeon: Nilo Chavira MD;  Location: [facility] CATH LAB;  Service: Cardiology;  Laterality: N/A;    LAPAROSCOPIC COLOSTOMY      LEFT HEART CATHETERIZATION Left 2/3/2024    Procedure: Left heart cath;  Surgeon: Nilo Chavira MD;  Location: [facility] CATH LAB;  Service: Cardiology;  Laterality: Left;    PERCUTANEOUS CORONARY INTERVENTION, ARTERY N/A 2/3/2024    Procedure: Percutaneous coronary intervention;  Surgeon: Nilo Chavira MD;  Location: [facility] CATH LAB;  Service: Cardiology;  Laterality: N/A;       Social History[1]    Family History   Problem Relation Name Age of Onset    COPD Mother      Heart attack Mother      COPD Father      Heart attack Father         Review of Systems   Cardiovascular:  Negative for chest pain, dyspnea on exertion, leg swelling, near-syncope, palpitations and syncope.   Respiratory:  Positive for cough and shortness of breath. Negative for wheezing.    Musculoskeletal:  Positive for arthritis and back pain.   Neurological:  Negative for dizziness and light-headedness.       Current Outpatient Medications on File Prior to Visit   Medication Sig    albuterol (PROVENTIL/VENTOLIN HFA) 90 mcg/actuation inhaler Inhale 2 puffs into the lungs every 6 (six) hours as needed for Wheezing. Rescue    ALPRAZolam (XANAX) 0.25 MG tablet Take 0.25 mg by mouth.    aspirin 81 MG Chew Take 1 tablet (81 mg total) by mouth once daily.    atorvastatin (LIPITOR) 80 MG tablet Take 1 tablet (80 mg total) by mouth once daily.    fluticasone-salmeterol diskus inhaler 250-50 mcg Inhale 1 puff into the lungs 2 (two) times a day.     HYDROcodone-acetaminophen (NORCO)  mg per tablet Take 1 tablet by mouth every 6 (six) hours as needed for Pain.    isosorbide mononitrate (IMDUR) 30 MG 24 hr tablet Take 1 tablet (30 mg total) by mouth once daily.    losartan (COZAAR) 50 MG tablet Take 1 tablet (50 mg total) by mouth once daily.    meclizine (ANTIVERT) 25 mg tablet Take 25 mg by mouth 2 (two) times daily as needed.    metoprolol succinate (TOPROL-XL) 50 MG 24 hr tablet take ONE-HALF (1/2) tablets (25 MILLIGRAM total) by mouth once daily.    mupirocin (BACTROBAN) 2 % ointment Apply topically 3 (three) times daily.    nitroGLYCERIN (NITROSTAT) 0.4 MG SL tablet Place 1 tablet (0.4 mg total) under the tongue every 5 (five) minutes as needed for Chest pain (angina).    pantoprazole (PROTONIX) 20 MG tablet Take 20 mg by mouth every morning.    prasugreL HCl (EFFIENT) 10 mg Tab take 1 tablet (10 MILLIGRAM total) by mouth once daily.     No current facility-administered medications on file prior to visit.       Objective:   Objective:  Wt Readings from Last 3 Encounters:   04/11/25 104.7 kg (230 lb 13.2 oz)   03/27/25 101.7 kg (224 lb 3.3 oz)   10/31/24 96.6 kg (212 lb 15.4 oz)     Temp Readings from Last 3 Encounters:   03/27/25 97.7 °F (36.5 °C) (Oral)   07/05/24 98.4 °F (36.9 °C) (Oral)   07/05/24 98.2 °F (36.8 °C) (Oral)     BP Readings from Last 3 Encounters:   04/11/25 (!) 148/92   03/27/25 (!) 153/95   10/31/24 (!) 140/98     Pulse Readings from Last 3 Encounters:   04/11/25 96   03/27/25 85   10/31/24 72     Vitals:    04/11/25 0924   BP: (!) 148/92   Pulse: 96         Physical Exam  Vitals reviewed.   Constitutional:       Appearance: Normal appearance. He is obese.   HENT:      Head: Normocephalic and atraumatic.      Nose: Nose normal.   Eyes:      Extraocular Movements: Extraocular movements intact.   Cardiovascular:      Rate and Rhythm: Normal rate.   Pulmonary:      Effort: Pulmonary effort is normal.      Breath sounds: Wheezing  "present.   Abdominal:      Palpations: Abdomen is soft.   Musculoskeletal:         General: Normal range of motion.      Cervical back: Normal range of motion and neck supple.      Right lower leg: No edema.      Left lower leg: No edema.   Skin:     General: Skin is warm and dry.   Neurological:      General: No focal deficit present.      Mental Status: He is alert and oriented to person, place, and time. Mental status is at baseline.   Psychiatric:         Mood and Affect: Mood normal.         Behavior: Behavior normal.         Lab Results   Component Value Date    CHOL 198 02/03/2024     Lab Results   Component Value Date    HDL 30 (L) 02/03/2024     Lab Results   Component Value Date    LDLCALC 126.0 02/03/2024     Lab Results   Component Value Date    TRIG 210 (H) 02/03/2024     Lab Results   Component Value Date    CHOLHDL 15.2 (L) 02/03/2024       Chemistry        Component Value Date/Time     07/05/2024 2106    K 4.1 07/05/2024 2106     07/05/2024 2106    CO2 23 07/05/2024 2106    BUN 18 07/05/2024 2106    CREATININE 1.2 07/05/2024 2106     (H) 07/05/2024 2106        Component Value Date/Time    CALCIUM 9.7 07/05/2024 2106    ALKPHOS 74 07/05/2024 2106    AST 30 07/05/2024 2106    ALT 36 07/05/2024 2106    BILITOT 0.4 07/05/2024 2106    ESTGFRAFRICA >60.0 12/19/2021 1403    EGFRNONAA 56.9 (A) 12/19/2021 1403          No results found for: "TSH"  Lab Results   Component Value Date    INR 0.9 02/03/2024     Lab Results   Component Value Date    WBC 8.93 07/05/2024    HGB 14.0 07/05/2024    HCT 41.4 07/05/2024    MCV 88 07/05/2024     07/05/2024     BNP  @LABRCNTIP(BNP,BNPTRIAGEBLO)@  CrCl cannot be calculated (Patient's most recent lab result is older than the maximum 7 days allowed.).     Imaging:  ======    No results found for this or any previous visit.    No results found for this or any previous visit.    Results for orders placed during the hospital encounter of " 07/05/24    X-Ray Chest PA And Lateral    Narrative  EXAMINATION:  XR CHEST PA AND LATERAL    CLINICAL HISTORY:  Cough, unspecified    TECHNIQUE:  PA and lateral views of the chest were performed.    COMPARISON:  Multiple priors    FINDINGS:  The lungs are clear, with normal appearance of pulmonary vasculature and no pleural effusion or pneumothorax.    The cardiac silhouette is normal in size. The hilar and mediastinal contours are unremarkable.    Bones are intact.    Impression  No acute abnormality.      Electronically signed by: Terence Cross  Date:    07/05/2024  Time:    08:40    No results found for this or any previous visit.    No valid procedures specified.    Results for orders placed during the hospital encounter of 02/03/24    Cardiac catheterization    Conclusion    Successful PCI 99% mid LAD stenosis treated with 2.5 x 28 mm Synergy BARAK x one (posted to 3.0 mm with 0% residual stenosis) for NSTEMI.    60 - 70% ostial Ramus stenosis: + iFR.  OMT advised for Ramus for now.    50% mid RCA.    EF 50% with WMA.    The procedure log was documented by Documenter: Kari Aguilar RN and verified by Nilo Chavira MD.    Date: 2/3/2024  Time: 2:15 PM      No results found for this or any previous visit.      Results for orders placed during the hospital encounter of 02/03/24    Echo    Interpretation Summary    Left Ventricle: The left ventricle is normal in size. Normal wall thickness. There is concentric remodeling. Regional wall motion abnormalities present. There is low normal systolic function with a visually estimated ejection fraction of 50 - 55%. Ejection fraction by visual approximation is 50%. There is normal diastolic function.    Right Ventricle: Normal right ventricular cavity size. Wall thickness is normal. Right ventricle wall motion  is normal. Systolic function is normal.    Pulmonary Artery: The estimated pulmonary artery systolic pressure is 14 mmHg.    IVC/SVC: Normal venous pressure at 3  mmHg.      Diagnostic Results:  ECG: Reviewed    The ASCVD Risk score (Katharine THOMPSON, et al., 2019) failed to calculate for the following reasons:    Risk score cannot be calculated because patient has a medical history suggesting prior/existing ASCVD        Assessment and Plan:   1. Coronary artery disease involving native coronary artery of native heart with unstable angina pectoris  Assessment & Plan:  Stable, asymptomatic   Continue with same meds  Nitro PRN, has not needed to use       2. Tobacco abuse  Assessment & Plan:  Counseled on the importance of smoke cessation in order to improve overall quality of life and reduce risk of future comorbidities.       3. Gastroesophageal reflux disease without esophagitis  Assessment & Plan:  Continue with current treatment       4. Chronic obstructive pulmonary disease, unspecified COPD type  Assessment & Plan:  Continue with current meds  Stable       5. Anxiety  Assessment & Plan:  Continue with current meds  Asked about increasing dosage, discussed follow up with PCP  He is worried this could also contribute to his elevated BP readings      6. Severe obesity (BMI 35.0-39.9) with comorbidity  Assessment & Plan:  Weight loss and exercise as tolerated       7. Hypertension, unspecified type  Assessment & Plan:  Blood pressure remains elevated  Increase losartan 100 mg daily, take 2 pills in the morning, if works with change meds to 100 mg pill  BP log  RTC in 4 weeks with log  Call sooner for any new problems     Orders:  -     losartan (COZAAR) 50 MG tablet; Take 2 tablets (100 mg total) by mouth once daily.  Dispense: 60 tablet; Refill: 0    8. Prediabetes  Assessment & Plan:  Most recent 5.9  Continue with current regimen  Diet and exercise       9. Hyperlipidemia, unspecified hyperlipidemia type  Assessment & Plan:  Previous lipids not at goal  Continue with statin, low fat diet   Repeat lipids should be soon  Consider additional therapy if not at goal            Risk  factor modification discussed.   Cardiac low salt diet discussed.  Maintaining healthy weight and weight loss goals were discussed in clinic.    Follow up in 4 weeks          [1]   Social History  Tobacco Use    Smoking status: Every Day     Current packs/day: 1.50     Average packs/day: 1.5 packs/day for 21.2 years (31.8 ttl pk-yrs)     Types: Cigarettes     Start date: 2/3/2004    Smokeless tobacco: Never   Substance Use Topics    Alcohol use: No    Drug use: No

## 2025-04-14 DIAGNOSIS — I21.4 NSTEMI (NON-ST ELEVATED MYOCARDIAL INFARCTION): ICD-10-CM

## 2025-04-14 DIAGNOSIS — I25.110 CORONARY ARTERY DISEASE INVOLVING NATIVE CORONARY ARTERY OF NATIVE HEART WITH UNSTABLE ANGINA PECTORIS: ICD-10-CM

## 2025-04-14 DIAGNOSIS — I10 HYPERTENSION, UNSPECIFIED TYPE: ICD-10-CM

## 2025-04-14 DIAGNOSIS — E78.5 HYPERLIPIDEMIA, UNSPECIFIED HYPERLIPIDEMIA TYPE: Primary | ICD-10-CM

## 2025-04-14 RX ORDER — ATORVASTATIN CALCIUM 80 MG/1
80 TABLET, FILM COATED ORAL NIGHTLY
Qty: 30 TABLET | Refills: 1 | Status: SHIPPED | OUTPATIENT
Start: 2025-04-14

## 2025-04-14 NOTE — TELEPHONE ENCOUNTER
LVM for patient to give office a called back to get fasting lab scheduled or he can come into his nearest [facility] lab to have completed.

## 2025-04-29 DIAGNOSIS — I21.4 NSTEMI (NON-ST ELEVATED MYOCARDIAL INFARCTION): ICD-10-CM

## 2025-04-29 DIAGNOSIS — I25.110 CORONARY ARTERY DISEASE INVOLVING NATIVE CORONARY ARTERY OF NATIVE HEART WITH UNSTABLE ANGINA PECTORIS: ICD-10-CM

## 2025-04-29 DIAGNOSIS — I10 HYPERTENSION, UNSPECIFIED TYPE: ICD-10-CM

## 2025-04-29 RX ORDER — ISOSORBIDE MONONITRATE 30 MG/1
TABLET, EXTENDED RELEASE ORAL
Qty: 90 TABLET | Refills: 0 | Status: SHIPPED | OUTPATIENT
Start: 2025-04-29

## 2025-04-30 ENCOUNTER — TELEPHONE (OUTPATIENT)
Dept: CARDIOLOGY | Facility: CLINIC | Age: 70
End: 2025-04-30
Payer: MEDICARE

## 2025-04-30 NOTE — TELEPHONE ENCOUNTER
LM for pt to call us back      ----- Message from Shahana sent at 4/30/2025 10:21 AM CDT -----  Contact: self  Patient is requesting a call back regarding blood pressure. Please call back at [phone]

## 2025-05-09 ENCOUNTER — TELEPHONE (OUTPATIENT)
Dept: CARDIOLOGY | Facility: CLINIC | Age: 70
End: 2025-05-09
Payer: MEDICARE

## 2025-05-09 NOTE — TELEPHONE ENCOUNTER
----- Message from Shahana sent at 5/9/2025  8:27 AM CDT -----  Contact: spouse  Patient is requesting a call back regarding asking if medication can be changed for BP - may have to cancel appt today. Please call back at [phone]

## 2025-05-22 ENCOUNTER — TELEPHONE (OUTPATIENT)
Dept: CARDIOLOGY | Facility: CLINIC | Age: 70
End: 2025-05-22
Payer: MEDICARE

## 2025-05-26 ENCOUNTER — OFFICE VISIT (OUTPATIENT)
Dept: CARDIOLOGY | Facility: CLINIC | Age: 70
End: 2025-05-26
Payer: MEDICARE

## 2025-05-26 ENCOUNTER — LAB VISIT (OUTPATIENT)
Dept: LAB | Facility: HOSPITAL | Age: 70
End: 2025-05-26
Payer: MEDICARE

## 2025-05-26 VITALS
BODY MASS INDEX: 36.57 KG/M2 | WEIGHT: 233 LBS | DIASTOLIC BLOOD PRESSURE: 106 MMHG | HEIGHT: 67 IN | HEART RATE: 86 BPM | SYSTOLIC BLOOD PRESSURE: 168 MMHG

## 2025-05-26 DIAGNOSIS — E78.5 HYPERLIPIDEMIA, UNSPECIFIED HYPERLIPIDEMIA TYPE: ICD-10-CM

## 2025-05-26 DIAGNOSIS — I21.4 NSTEMI (NON-ST ELEVATED MYOCARDIAL INFARCTION): ICD-10-CM

## 2025-05-26 DIAGNOSIS — I25.110 CORONARY ARTERY DISEASE INVOLVING NATIVE CORONARY ARTERY OF NATIVE HEART WITH UNSTABLE ANGINA PECTORIS: ICD-10-CM

## 2025-05-26 DIAGNOSIS — I10 HYPERTENSION, UNSPECIFIED TYPE: ICD-10-CM

## 2025-05-26 DIAGNOSIS — R06.83 SNORES: ICD-10-CM

## 2025-05-26 DIAGNOSIS — Z72.0 TOBACCO ABUSE: ICD-10-CM

## 2025-05-26 DIAGNOSIS — I10 HYPERTENSION, UNSPECIFIED TYPE: Primary | ICD-10-CM

## 2025-05-26 PROCEDURE — 83880 ASSAY OF NATRIURETIC PEPTIDE: CPT

## 2025-05-26 PROCEDURE — 36415 COLL VENOUS BLD VENIPUNCTURE: CPT

## 2025-05-26 PROCEDURE — 99999 PR PBB SHADOW E&M-EST. PATIENT-LVL IV: CPT | Mod: PBBFAC,,,

## 2025-05-26 RX ORDER — METOPROLOL SUCCINATE 25 MG/1
25 TABLET, EXTENDED RELEASE ORAL DAILY
Qty: 30 TABLET | Refills: 3 | Status: SHIPPED | OUTPATIENT
Start: 2025-05-26 | End: 2026-05-26

## 2025-05-26 RX ORDER — TIRZEPATIDE 7.5 MG/.5ML
7.5 INJECTION, SOLUTION SUBCUTANEOUS
COMMUNITY
Start: 2025-05-16

## 2025-05-26 RX ORDER — FLUTICASONE FUROATE, UMECLIDINIUM BROMIDE AND VILANTEROL TRIFENATATE 100; 62.5; 25 UG/1; UG/1; UG/1
1 POWDER RESPIRATORY (INHALATION)
COMMUNITY
Start: 2025-05-16

## 2025-05-26 RX ORDER — LISINOPRIL AND HYDROCHLOROTHIAZIDE 12.5; 2 MG/1; MG/1
1 TABLET ORAL DAILY
Qty: 30 TABLET | Refills: 3 | Status: SHIPPED | OUTPATIENT
Start: 2025-05-26 | End: 2026-05-26

## 2025-05-26 NOTE — PROGRESS NOTES
Subjective:   Patient ID:  Eber Wilson is a 70 y.o. male who presents for evaluation of No chief complaint on file.      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 Chavira.  Here today for CV follow-up complaining of elevated blood pressure readings as well as muscle aches and cramps.  Blood pressure elevated in clinic today, wife states he did not take his medication this morning however has noted blood pressure readings at home as high as 220s/120s.  Of note he was switched to losartan from lisinopril/hctz for hypotension after his stent, since his BP has been elevated.  Denies any new chest pain, he does have chronic shortness of breath.  Stays active and has noticed more pain in his legs with little activity in the last month.  He sees outside primary care, we will have recent labs faxed over    Lab Results   Component Value Date    HGBA1C 5.9 (H) 02/03/2024       Past Medical History:   Diagnosis Date    Anxiety     COPD (chronic obstructive pulmonary disease)     Diverticular disease     Hypertension        Past Surgical History:   Procedure Laterality Date    COLOSTOMY CLOSURE      CORONARY STENT PLACEMENT N/A 2/3/2024    Procedure: INSERTION, STENT, CORONARY ARTERY;  Surgeon: Nilo Chavira MD;  Location: [facility] CATH LAB;  Service: Cardiology;  Laterality: N/A;    HERNIA REPAIR      INSTANTANEOUS WAVE-FREE RATIO (IFR) N/A 2/3/2024    Procedure: Instantaneous Wave-Free Ratio (IFR);  Surgeon: Nilo Chavira MD;  Location: [facility] CATH LAB;  Service: Cardiology;  Laterality: N/A;    LAPAROSCOPIC COLOSTOMY      LEFT HEART CATHETERIZATION Left 2/3/2024    Procedure: Left heart cath;  Surgeon: Nilo Chavira MD;  Location: [facility] CATH LAB;  Service: Cardiology;  Laterality: Left;    PERCUTANEOUS CORONARY INTERVENTION, ARTERY N/A 2/3/2024     Procedure: Percutaneous coronary intervention;  Surgeon: Nilo Chavira MD;  Location: [facility] CATH LAB;  Service: Cardiology;  Laterality: N/A;       Social History[1]    Family History   Problem Relation Name Age of Onset    COPD Mother      Heart attack Mother      COPD Father      Heart attack Father         Medications Ordered Prior to Encounter[2]   Wt Readings from Last 3 Encounters:   05/26/25 105.7 kg (233 lb 0.4 oz)   04/11/25 104.7 kg (230 lb 13.2 oz)   03/27/25 101.7 kg (224 lb 3.3 oz)     Temp Readings from Last 3 Encounters:   03/27/25 97.7 °F (36.5 °C) (Oral)   07/05/24 98.4 °F (36.9 °C) (Oral)   07/05/24 98.2 °F (36.8 °C) (Oral)     BP Readings from Last 3 Encounters:   05/26/25 (!) 168/106   04/11/25 (!) 148/92   03/27/25 (!) 153/95     Pulse Readings from Last 3 Encounters:   05/26/25 86   04/11/25 96   03/27/25 85        Review of Systems   Constitutional: Positive for malaise/fatigue.   HENT: Negative.     Eyes: Negative.    Cardiovascular:  Positive for claudication, dyspnea on exertion and leg swelling.   Respiratory:  Positive for shortness of breath and snoring.    Skin: Negative.    Musculoskeletal:  Positive for arthritis, back pain, falls, joint pain and muscle cramps.   Gastrointestinal: Negative.    Genitourinary: Negative.    Neurological:  Positive for weakness.   Psychiatric/Behavioral: Negative.         Objective:   Physical Exam  Vitals and nursing note reviewed.   Constitutional:       Appearance: Normal appearance.   HENT:      Head: Normocephalic and atraumatic.   Eyes:      General:         Right eye: No discharge.         Left eye: No discharge.      Pupils: Pupils are equal, round, and reactive to light.   Cardiovascular:      Rate and Rhythm: Normal rate and regular rhythm.      Heart sounds: S1 normal and S2 normal. Murmur heard.      No friction rub.   Pulmonary:      Effort: Pulmonary effort is normal. No respiratory distress.      Breath sounds: Normal breath sounds. No  "rales.   Abdominal:      Palpations: Abdomen is soft.      Tenderness: There is no abdominal tenderness.   Musculoskeletal:      Cervical back: Neck supple.      Right lower leg: No edema.      Left lower leg: No edema.   Skin:     General: Skin is warm and dry.   Neurological:      General: No focal deficit present.      Mental Status: He is alert and oriented to person, place, and time.   Psychiatric:         Mood and Affect: Mood is anxious.         Behavior: Behavior normal.         Thought Content: Thought content normal.         Lab Results   Component Value Date    CHOL 198 02/03/2024     Lab Results   Component Value Date    HDL 30 (L) 02/03/2024     Lab Results   Component Value Date    LDLCALC 126.0 02/03/2024     Lab Results   Component Value Date    TRIG 210 (H) 02/03/2024     Lab Results   Component Value Date    CHOLHDL 15.2 (L) 02/03/2024       Chemistry        Component Value Date/Time     07/05/2024 2106    K 4.1 07/05/2024 2106     07/05/2024 2106    CO2 23 07/05/2024 2106    BUN 18 07/05/2024 2106    CREATININE 1.2 07/05/2024 2106     (H) 07/05/2024 2106        Component Value Date/Time    CALCIUM 9.7 07/05/2024 2106    ALKPHOS 74 07/05/2024 2106    AST 30 07/05/2024 2106    ALT 36 07/05/2024 2106    BILITOT 0.4 07/05/2024 2106    ESTGFRAFRICA >60.0 12/19/2021 1403    EGFRNONAA 56.9 (A) 12/19/2021 1403          No results found for: "TSH"  Lab Results   Component Value Date    INR 0.9 02/03/2024     @RESUFAST(WBC,HGB,HCT,MCV,PLT)  @LABRCNTIP(BNP,BNPTRIAGEBLO)@  CrCl cannot be calculated (Patient's most recent lab result is older than the maximum 7 days allowed.).     Results for orders placed during the hospital encounter of 02/03/24    Echo    Interpretation Summary    Left Ventricle: The left ventricle is normal in size. Normal wall thickness. There is concentric remodeling. Regional wall motion abnormalities present. There is low normal systolic function with a visually " estimated ejection fraction of 50 - 55%. Ejection fraction by visual approximation is 50%. There is normal diastolic function.    Right Ventricle: Normal right ventricular cavity size. Wall thickness is normal. Right ventricle wall motion  is normal. Systolic function is normal.    Pulmonary Artery: The estimated pulmonary artery systolic pressure is 14 mmHg.    IVC/SVC: Normal venous pressure at 3 mmHg.     No results found for this or any previous visit.     Assessment:      1. Hypertension, unspecified type    2. Coronary artery disease involving native coronary artery of native heart with unstable angina pectoris    3. Hyperlipidemia, unspecified hyperlipidemia type    4. Tobacco abuse        Plan:   Hypertension, unspecified type    Coronary artery disease involving native coronary artery of native heart with unstable angina pectoris    Hyperlipidemia, unspecified hyperlipidemia type    Tobacco abuse      Patient is feeling extremely anxious for today's visit states the only thing that works for him is Ativan    DC losartan, resume lisinopril/HCTZ  Previously on 50 mg tablet of Toprol cutting in half, will order 25 mg tablets    Lisinopril-HCTZ, metoprolol, Imdur, low-sodium diet, profile blood pressure (consider switching to carvedilol if still elevated vs dosage adjusting)-HTN  Aspirin, statin, metoprolol, Imdur-CAD   Statin, low-fat diet HLP   Risk factor modifications   Smoking cessation   Daily exercise as tolerated     Check BNP today  Need recent labs from PCP, at least CMP, fasting lipid, CBC.  Evaluate potassium given complaint of cramping consider DARLINE/US in future    Return to clinic in 1 month for blood pressure check, patient will try virtual    Courtney Guillot, FNP-C [facility], Cardiology         [1]   Social History  Tobacco Use    Smoking status: Every Day     Current packs/day: 1.50     Average packs/day: 1.5 packs/day for 21.3 years (32.0 ttl pk-yrs)     Types: Cigarettes     Start date:  2/3/2004    Smokeless tobacco: Never   Substance Use Topics    Alcohol use: No    Drug use: No   [2]   Current Outpatient Medications on File Prior to Visit   Medication Sig Dispense Refill    albuterol (PROVENTIL/VENTOLIN HFA) 90 mcg/actuation inhaler Inhale 2 puffs into the lungs every 6 (six) hours as needed for Wheezing. Rescue      ALPRAZolam (XANAX) 0.25 MG tablet Take 0.25 mg by mouth.      aspirin 81 MG Chew Take 1 tablet (81 mg total) by mouth once daily. 90 tablet 3    atorvastatin (LIPITOR) 80 MG tablet Take 1 tablet (80 mg total) by mouth every evening. 30 tablet 1    fluticasone-salmeterol diskus inhaler 250-50 mcg Inhale 1 puff into the lungs 2 (two) times a day.      isosorbide mononitrate (IMDUR) 30 MG 24 hr tablet take 1 tablet (30 MILLIGRAM total) by mouth once daily. 90 tablet 0    losartan (COZAAR) 50 MG tablet Take 1 tablet (50 mg total) by mouth once daily. 90 tablet 3    meclizine (ANTIVERT) 25 mg tablet Take 25 mg by mouth 2 (two) times daily as needed.      metoprolol succinate (TOPROL-XL) 50 MG 24 hr tablet take ONE-HALF (1/2) tablets (25 MILLIGRAM total) by mouth once daily. 45 tablet 0    MOUNJARO 7.5 mg/0.5 mL PnIj Inject 7.5 mg into the skin.      mupirocin (BACTROBAN) 2 % ointment Apply topically 3 (three) times daily. 15 g 0    nitroGLYCERIN (NITROSTAT) 0.4 MG SL tablet Place 1 tablet (0.4 mg total) under the tongue every 5 (five) minutes as needed for Chest pain (angina). 25 tablet PRN    pantoprazole (PROTONIX) 20 MG tablet Take 20 mg by mouth every morning.      prasugreL HCl (EFFIENT) 10 mg Tab take 1 tablet (10 MILLIGRAM total) by mouth once daily. 90 tablet 1    TRELEGY ELLIPTA 100-62.5-25 mcg DsDv Inhale 1 puff into the lungs.      HYDROcodone-acetaminophen (NORCO)  mg per tablet Take 1 tablet by mouth every 6 (six) hours as needed for Pain. (Patient not taking: Reported on 5/26/2025) 12 tablet 0     No current facility-administered medications on file prior to visit.

## 2025-05-28 ENCOUNTER — RESULTS FOLLOW-UP (OUTPATIENT)
Dept: CARDIOLOGY | Facility: CLINIC | Age: 70
End: 2025-05-28

## 2025-05-28 ENCOUNTER — TELEPHONE (OUTPATIENT)
Dept: CARDIOLOGY | Facility: CLINIC | Age: 70
End: 2025-05-28
Payer: MEDICARE

## 2025-05-28 LAB — NT-PROBNP SERPL IA-MCNC: 68 PG/ML

## 2025-05-28 NOTE — TELEPHONE ENCOUNTER
LVM to review Fluid marker is nml, we are waiting on records to be faxed over from PCP for most recent labs     Sent a [employer] message ----- Message from Claudette Marcos NP sent at 5/28/2025  2:15 PM CDT -----  Fluid marker is nml, we are waiting on records to be faxed over from PCP for most recent labs  ----- Message -----  From: Lab, Background User  Sent: 5/28/2025   1:53 PM CDT  To: Claudette Marcos NP

## 2025-05-28 NOTE — TELEPHONE ENCOUNTER
Contacted PT and reviewed results -Fluid marker is nml, we are waiting on records to be faxed over from PCP for most recent labs    PT stated verbal understanding         Copied from CRM #[id number]. Topic: General Inquiry - Return Call  >> May 28, 2025  3:55 PM Alice wrote:  Type:  Patient Returning Call    Who Called:Jennifer  Who Left Message for Patient:Tiffanie  Does the patient know what this is regarding?:Results  Would the patient rather a call back or a response via [email or web address]? Call back  Best Call Back Number: [phone] / [phone]  Additional Information: Missed call

## 2025-06-24 DIAGNOSIS — I21.4 NSTEMI (NON-ST ELEVATED MYOCARDIAL INFARCTION): ICD-10-CM

## 2025-06-24 DIAGNOSIS — I10 HYPERTENSION, UNSPECIFIED TYPE: ICD-10-CM

## 2025-06-24 DIAGNOSIS — I25.110 CORONARY ARTERY DISEASE INVOLVING NATIVE CORONARY ARTERY OF NATIVE HEART WITH UNSTABLE ANGINA PECTORIS: ICD-10-CM

## 2025-06-24 RX ORDER — ATORVASTATIN CALCIUM 80 MG/1
TABLET, FILM COATED ORAL
Qty: 30 TABLET | Refills: 0 | Status: SHIPPED | OUTPATIENT
Start: 2025-06-24

## 2025-06-30 ENCOUNTER — TELEPHONE (OUTPATIENT)
Dept: CARDIOLOGY | Facility: CLINIC | Age: 70
End: 2025-06-30
Payer: MEDICARE

## 2025-07-02 DIAGNOSIS — I25.10 CORONARY ARTERY DISEASE INVOLVING NATIVE CORONARY ARTERY OF NATIVE HEART WITHOUT ANGINA PECTORIS: Primary | ICD-10-CM

## 2025-07-03 ENCOUNTER — PATIENT MESSAGE (OUTPATIENT)
Dept: CARDIOLOGY | Facility: CLINIC | Age: 70
End: 2025-07-03

## 2025-07-03 ENCOUNTER — PATIENT OUTREACH (OUTPATIENT)
Dept: ADMINISTRATIVE | Facility: HOSPITAL | Age: 70
End: 2025-07-03
Payer: MEDICARE

## 2025-07-03 ENCOUNTER — DOCUMENTATION ONLY (OUTPATIENT)
Dept: CARDIOLOGY | Facility: CLINIC | Age: 70
End: 2025-07-03
Payer: MEDICARE

## 2025-07-03 NOTE — PROGRESS NOTES
Spoke with pt, unable to login for virtual visit. BP has been 120-130s/60s feeling better. Works outside, tries to stay hydrated. Has made changes to his diet, used to eat [place] everyday. Complaint with medications. Still with occ cramping. Will keep medications the same. RTC 6m  Pcp is Dr. Grover at Dr. Rodgers's office in [place]

## 2025-07-22 DIAGNOSIS — I25.110 CORONARY ARTERY DISEASE INVOLVING NATIVE CORONARY ARTERY OF NATIVE HEART WITH UNSTABLE ANGINA PECTORIS: ICD-10-CM

## 2025-07-22 DIAGNOSIS — I10 HYPERTENSION, UNSPECIFIED TYPE: ICD-10-CM

## 2025-07-22 DIAGNOSIS — I21.4 NSTEMI (NON-ST ELEVATED MYOCARDIAL INFARCTION): ICD-10-CM

## 2025-07-22 RX ORDER — ATORVASTATIN CALCIUM 80 MG/1
TABLET, FILM COATED ORAL
Qty: 90 TABLET | Refills: 0 | Status: SHIPPED | OUTPATIENT
Start: 2025-07-22

## 2025-08-12 RX ORDER — NITROGLYCERIN 0.4 MG/1
0.4 TABLET SUBLINGUAL EVERY 5 MIN PRN
Qty: 25 TABLET | Status: SHIPPED | OUTPATIENT
Start: 2025-08-12 | End: 2026-08-12

## 2025-08-29 ENCOUNTER — PATIENT MESSAGE (OUTPATIENT)
Dept: ADMINISTRATIVE | Facility: HOSPITAL | Age: 70
End: 2025-08-29
Payer: MEDICARE

## 2025-09-02 ENCOUNTER — PATIENT OUTREACH (OUTPATIENT)
Dept: ADMINISTRATIVE | Facility: HOSPITAL | Age: 70
End: 2025-09-02
Payer: MEDICARE

## (undated) DEVICE — CATH JR4 5FR

## (undated) DEVICE — KIT MANIFOLD LOW PRESS TUBING

## (undated) DEVICE — GUIDE LAUNCHER 6FR EBU 3.5

## (undated) DEVICE — CATH PIG145 INFINITI 5X110CM

## (undated) DEVICE — KIT GLIDESHEATH SLEND 6FR 10CM

## (undated) DEVICE — DRAPE ANGIO BRACH 38X44IN

## (undated) DEVICE — KIT WATCHDOG HEMSTAS VALVE 8FR

## (undated) DEVICE — CATH JL3.5 5FR

## (undated) DEVICE — GUIDEWIRE OMNI J TIP 185CM

## (undated) DEVICE — WIRE BMW 014X190

## (undated) DEVICE — GUIDEWIRE WHOLEY HI TORQ 175CM

## (undated) DEVICE — BAND TR COMP DEVICE REG 24CM

## (undated) DEVICE — CATH NC EMERGE MR 3X20MM

## (undated) DEVICE — CATH EMERGE MR 15 X 2.50

## (undated) DEVICE — GUIDEWIRE EMERALD .035IN 260CM

## (undated) DEVICE — PACK ANGIOPLASTY ACCESS PLUS

## (undated) DEVICE — GUIDE LAUNCHER 6FR EBU 3.75

## (undated) DEVICE — ANGIOTOUCH KIT

## (undated) DEVICE — CATH INFINITI MP JL3.5 JR4 5FR

## (undated) DEVICE — PACK HEART CATH BR

## (undated) DEVICE — PAD DEFIB CADENCE ADULT R2

## (undated) DEVICE — OMNIPAQUE 300MG 150ML VIAL

## (undated) DEVICE — INFLATOR ENCORE 26 BLLN INFL